# Patient Record
Sex: FEMALE | Race: WHITE | Employment: UNEMPLOYED | URBAN - NONMETROPOLITAN AREA
[De-identification: names, ages, dates, MRNs, and addresses within clinical notes are randomized per-mention and may not be internally consistent; named-entity substitution may affect disease eponyms.]

---

## 2018-02-06 ENCOUNTER — DOCUMENTATION ONLY (OUTPATIENT)
Dept: FAMILY MEDICINE | Facility: OTHER | Age: 53
End: 2018-02-06

## 2018-02-06 PROBLEM — J45.20 ASTHMA, INTERMITTENT: Status: ACTIVE | Noted: 2018-02-06

## 2018-02-06 PROBLEM — F19.20 CHEMICAL DEPENDENCY (H): Status: ACTIVE | Noted: 2018-02-06

## 2018-02-06 RX ORDER — BUDESONIDE AND FORMOTEROL FUMARATE DIHYDRATE 160; 4.5 UG/1; UG/1
2 AEROSOL RESPIRATORY (INHALATION) 2 TIMES DAILY
COMMUNITY
Start: 2016-03-21 | End: 2018-10-31

## 2018-02-06 RX ORDER — IBUPROFEN 600 MG/1
1 TABLET, FILM COATED ORAL DAILY
Status: ON HOLD | COMMUNITY
Start: 2013-09-11 | End: 2020-04-05

## 2018-02-06 RX ORDER — HYDROXYZINE HYDROCHLORIDE 50 MG/1
50 TABLET, FILM COATED ORAL 4 TIMES DAILY
COMMUNITY
Start: 2016-09-12 | End: 2018-06-11

## 2018-02-06 RX ORDER — ALBUTEROL SULFATE 90 UG/1
2 AEROSOL, METERED RESPIRATORY (INHALATION) EVERY 4 HOURS PRN
COMMUNITY
Start: 2015-01-12 | End: 2018-10-31

## 2018-02-06 RX ORDER — IPRATROPIUM BROMIDE AND ALBUTEROL SULFATE 2.5; .5 MG/3ML; MG/3ML
3 SOLUTION RESPIRATORY (INHALATION) 4 TIMES DAILY PRN
COMMUNITY
Start: 2016-09-12 | End: 2018-10-31

## 2018-02-06 RX ORDER — PREDNISONE 20 MG/1
TABLET ORAL
COMMUNITY
Start: 2016-09-12 | End: 2018-06-11

## 2018-02-06 RX ORDER — ACETAMINOPHEN 650 MG/1
650 SUPPOSITORY RECTAL EVERY 4 HOURS PRN
COMMUNITY
Start: 2012-10-31 | End: 2020-03-16

## 2018-05-07 ENCOUNTER — OFFICE VISIT (OUTPATIENT)
Dept: FAMILY MEDICINE | Facility: OTHER | Age: 53
End: 2018-05-07
Attending: PHYSICIAN ASSISTANT
Payer: COMMERCIAL

## 2018-05-07 VITALS
OXYGEN SATURATION: 99 % | DIASTOLIC BLOOD PRESSURE: 82 MMHG | WEIGHT: 122 LBS | HEIGHT: 65 IN | SYSTOLIC BLOOD PRESSURE: 162 MMHG | TEMPERATURE: 99.1 F | HEART RATE: 120 BPM | BODY MASS INDEX: 20.33 KG/M2

## 2018-05-07 DIAGNOSIS — R82.90 ABNORMAL URINE FINDINGS: ICD-10-CM

## 2018-05-07 DIAGNOSIS — R30.0 DYSURIA: Primary | ICD-10-CM

## 2018-05-07 DIAGNOSIS — J01.00 ACUTE MAXILLARY SINUSITIS, RECURRENCE NOT SPECIFIED: ICD-10-CM

## 2018-05-07 LAB
ALBUMIN UR-MCNC: NEGATIVE MG/DL
APPEARANCE UR: CLEAR
BACTERIA #/AREA URNS HPF: ABNORMAL /HPF
BILIRUB UR QL STRIP: NEGATIVE
COLOR UR AUTO: YELLOW
GLUCOSE UR STRIP-MCNC: NEGATIVE MG/DL
HGB UR QL STRIP: ABNORMAL
KETONES UR STRIP-MCNC: NEGATIVE MG/DL
LEUKOCYTE ESTERASE UR QL STRIP: ABNORMAL
NITRATE UR QL: POSITIVE
PH UR STRIP: 5.5 PH (ref 5–7)
RBC #/AREA URNS AUTO: ABNORMAL /HPF
SOURCE: ABNORMAL
SP GR UR STRIP: <=1.005 (ref 1–1.03)
UROBILINOGEN UR STRIP-ACNC: 0.2 EU/DL (ref 0.2–1)
WBC #/AREA URNS AUTO: ABNORMAL /HPF

## 2018-05-07 PROCEDURE — G0463 HOSPITAL OUTPT CLINIC VISIT: HCPCS

## 2018-05-07 PROCEDURE — 81001 URINALYSIS AUTO W/SCOPE: CPT | Mod: ZL | Performed by: PHYSICIAN ASSISTANT

## 2018-05-07 PROCEDURE — 99203 OFFICE O/P NEW LOW 30 MIN: CPT | Performed by: PHYSICIAN ASSISTANT

## 2018-05-07 PROCEDURE — 87086 URINE CULTURE/COLONY COUNT: CPT | Mod: ZL | Performed by: PHYSICIAN ASSISTANT

## 2018-05-07 PROCEDURE — 87186 SC STD MICRODIL/AGAR DIL: CPT | Mod: ZL | Performed by: PHYSICIAN ASSISTANT

## 2018-05-07 PROCEDURE — 87088 URINE BACTERIA CULTURE: CPT | Mod: ZL | Performed by: PHYSICIAN ASSISTANT

## 2018-05-07 PROCEDURE — G0463 HOSPITAL OUTPT CLINIC VISIT: HCPCS | Mod: 25

## 2018-05-07 RX ORDER — SULFAMETHOXAZOLE/TRIMETHOPRIM 800-160 MG
1 TABLET ORAL 2 TIMES DAILY
Qty: 28 TABLET | Refills: 0 | Status: SHIPPED | OUTPATIENT
Start: 2018-05-07 | End: 2018-05-21

## 2018-05-07 ASSESSMENT — ANXIETY QUESTIONNAIRES
6. BECOMING EASILY ANNOYED OR IRRITABLE: NOT AT ALL
7. FEELING AFRAID AS IF SOMETHING AWFUL MIGHT HAPPEN: NOT AT ALL
2. NOT BEING ABLE TO STOP OR CONTROL WORRYING: NOT AT ALL
1. FEELING NERVOUS, ANXIOUS, OR ON EDGE: NOT AT ALL
5. BEING SO RESTLESS THAT IT IS HARD TO SIT STILL: NOT AT ALL
GAD7 TOTAL SCORE: 0
3. WORRYING TOO MUCH ABOUT DIFFERENT THINGS: NOT AT ALL

## 2018-05-07 ASSESSMENT — PAIN SCALES - GENERAL: PAINLEVEL: NO PAIN (0)

## 2018-05-07 ASSESSMENT — PATIENT HEALTH QUESTIONNAIRE - PHQ9: 5. POOR APPETITE OR OVEREATING: NOT AT ALL

## 2018-05-07 NOTE — NURSING NOTE
"Chief Complaint   Patient presents with     UTI     URI       Initial /82 (BP Location: Right arm, Patient Position: Chair, Cuff Size: Adult Large)  Pulse 120  Temp 99.1  F (37.3  C) (Tympanic)  Ht 5' 5\" (1.651 m)  Wt 122 lb (55.3 kg)  SpO2 99%  BMI 20.3 kg/m2 Estimated body mass index is 20.3 kg/(m^2) as calculated from the following:    Height as of this encounter: 5' 5\" (1.651 m).    Weight as of this encounter: 122 lb (55.3 kg).  Medication Reconciliation: complete  Deena Guzmán LPN    "

## 2018-05-07 NOTE — PROGRESS NOTES
SUBJECTIVE:   Nidhi Miguel is a 52 year old female who presents to clinic today for the following health issues: 2 week history of dysuria and urinary frequency. Next 2 week history nasal congestion, PND, cough, chills.      RESPIRATORY SYMPTOMS      Duration: 1 week    Description  nasal congestion and Shortness of breath    Severity: moderate    Accompanying signs and symptoms: None    History (predisposing factors):  COPD    Precipitating or alleviating factors: Resting    Therapies tried and outcome:  Advair     URINARY TRACT SYMPTOMS      Duration: 2 weeks    Description  dysuria, frequency, urgency and odor    Intensity:  severe    Accompanying signs and symptoms:  Fever/chills: YES- Patient states she gets chills at night and feels warm (unable to check temp)  Flank pain no   Nausea and vomiting: YES- nausea  Vaginal symptoms: none  Abdominal/Pelvic Pain: YES    History  History of frequent UTI's: YES  History of kidney stones: no   Sexually Active: YES  Possibility of pregnancy: No    Precipitating or alleviating factors: None    Therapies tried and outcome: ibuprofen   Outcome: none          Problem list and histories reviewed & adjusted, as indicated.  Additional history: as documented    There is no problem list on file for this patient.    History reviewed. No pertinent surgical history.    Social History   Substance Use Topics     Smoking status: Current Every Day Smoker     Packs/day: 1.00     Start date: 1/1/1980     Smokeless tobacco: Never Used     Alcohol use Yes     Family History   Problem Relation Age of Onset     Coronary Artery Disease Father      Hypertension Father      Prostate Cancer Father      DIABETES No family hx of      Colon Cancer No family hx of      Breast Cancer No family hx of      Other Cancer No family hx of          Current Outpatient Prescriptions   Medication Sig Dispense Refill     sulfamethoxazole-trimethoprim (BACTRIM DS/SEPTRA DS) 800-160 MG per tablet Take 1 tablet  "by mouth 2 times daily for 14 days 28 tablet 0     Not on File    Reviewed and updated as needed this visit by clinical staff  Tobacco  Allergies  Meds  Med Hx  Surg Hx  Fam Hx  Soc Hx      Reviewed and updated as needed this visit by Provider         ROS:  Constitutional, HEENT, cardiovascular, pulmonary, gi and gu systems are negative, except as otherwise noted.    OBJECTIVE:                                                    /82 (BP Location: Right arm, Patient Position: Chair, Cuff Size: Adult Large)  Pulse 120  Temp 99.1  F (37.3  C) (Tympanic)  Ht 5' 5\" (1.651 m)  Wt 122 lb (55.3 kg)  SpO2 99%  BMI 20.3 kg/m2  Body mass index is 20.3 kg/(m^2).  GENERAL APPEARANCE: healthy, alert and no distress  HENT: ear canals and TM's normal and and mouth without ulcers or lesions. Nasal mucosa erythematous, edematous  NECK: no adenopathy and no asymmetry, masses, or scars  RESP: lungs clear to auscultation - no rales, rhonchi or wheezes  CV: regular rates and rhythm, normal S1 S2, no S3 or S4 and no murmur, click or rub  ABDOMEN: soft, nontender, without hepatosplenomegaly or masses and bowel sounds normal         ASSESSMENT/PLAN:                                                    1. Dysuria    - *UA reflex to Microscopic and Culture - [facility]  - Urine Microscopic  - sulfamethoxazole-trimethoprim (BACTRIM DS/SEPTRA DS) 800-160 MG per tablet; Take 1 tablet by mouth 2 times daily for 14 days  Dispense: 28 tablet; Refill: 0    2. Abnormal urine findings    - Urine Culture Aerobic Bacterial    3. Acute maxillary sinusitis, recurrence not specified    - sulfamethoxazole-trimethoprim (BACTRIM DS/SEPTRA DS) 800-160 MG per tablet; Take 1 tablet by mouth 2 times daily for 14 days  Dispense: 28 tablet; Refill: 0      Follow up if symptoms persist or worsen.      WILIAM Muller  [facility]  "

## 2018-05-07 NOTE — MR AVS SNAPSHOT
"              After Visit Summary   2018    Nidhi Miguel    MRN: [id number]           Patient Information     Date Of Birth          1965        Visit Information        Provider Department      2018 2:00 PM Pam Acuña PA [facility]        Today's Diagnoses     Dysuria    -  1    Abnormal urine findings        Acute maxillary sinusitis, recurrence not specified           Follow-ups after your visit        Who to contact     If you have questions or need follow up information about today's clinic visit or your schedule please contact [facility] directly at [phone].  Normal or non-critical lab and imaging results will be communicated to you by 2degreesmobilehart, letter or phone within 4 business days after the clinic has received the results. If you do not hear from us within 7 days, please contact the clinic through 2degreesmobilehart or phone. If you have a critical or abnormal lab result, we will notify you by phone as soon as possible.  Submit refill requests through [employer] or call your pharmacy and they will forward the refill request to us. Please allow 3 business days for your refill to be completed.          Additional Information About Your Visit        MyChart Information     [employer] lets you send messages to your doctor, view your test results, renew your prescriptions, schedule appointments and more. To sign up, go to [email or web address] . Click on \"Log in\" on the left side of the screen, which will take you to the Welcome page. Then click on \"Sign up Now\" on the right side of the page.     You will be asked to enter the access code listed below, as well as some personal information. Please follow the directions to create your username and password.     Your access code is: [email or web address]  Expires: 2018  5:17 PM     Your access code will  in 90 days. If you need help or a new code, please call your [facility] or [phone].        Care EveryWhere ID  " "   This is your Care EveryWhere ID. This could be used by other organizations to access your [facility] medical records  [id number]        Your Vitals Were     Pulse Temperature Height Pulse Oximetry BMI (Body Mass Index)       120 99.1  F (37.3  C) (Tympanic) 5' 5\" (1.651 m) 99% 20.3 kg/m2        Blood Pressure from Last 3 Encounters:   05/07/18 162/82    Weight from Last 3 Encounters:   05/07/18 122 lb (55.3 kg)              We Performed the Following     *UA reflex to Microscopic and Culture - [facility]     Urine Culture Aerobic Bacterial     Urine Microscopic          Today's Medication Changes          These changes are accurate as of 5/7/18  5:17 PM.  If you have any questions, ask your nurse or doctor.               Start taking these medicines.        Dose/Directions    sulfamethoxazole-trimethoprim 800-160 MG per tablet   Commonly known as:  BACTRIM DS/SEPTRA DS   Used for:  Dysuria, Acute maxillary sinusitis, recurrence not specified   Started by:  Pam Acuña, PA        Dose:  1 tablet   Take 1 tablet by mouth 2 times daily for 14 days   Quantity:  28 tablet   Refills:  0            Where to get your medicines      These medications were sent to [id number] - [place], [place] - [address]   [id number] , [place] [address]     Phone:  [phone]     sulfamethoxazole-trimethoprim 800-160 MG per tablet                Primary Care Provider    None Specified       No primary provider on file.        Equal Access to Services     AMINAH [facility] : Lena mantilla Soannalise, waaxda luqadaha, qaybta kaalmada ramos wright . So [facility] [phone].    ATENCIÓN: Si habla español, tiene a diop disposición servicios gratuitos de asistencia lingüística. Masood al [phone].    We comply with applicable federal civil rights laws and [place] laws. We do not discriminate on the basis of race, color, national origin, age, disability, sex, sexual orientation, or " gender identity.            Thank you!     Thank you for choosing [facility]  for your care. Our goal is always to provide you with excellent care. Hearing back from our patients is one way we can continue to improve our services. Please take a few minutes to complete the written survey that you may receive in the mail after your visit with us. Thank you!             Your Updated Medication List - Protect others around you: Learn how to safely use, store and throw away your medicines at www.disposemymeds.org.          This list is accurate as of 5/7/18  5:17 PM.  Always use your most recent med list.                   Brand Name Dispense Instructions for use Diagnosis    sulfamethoxazole-trimethoprim 800-160 MG per tablet    BACTRIM DS/SEPTRA DS    28 tablet    Take 1 tablet by mouth 2 times daily for 14 days    Dysuria, Acute maxillary sinusitis, recurrence not specified

## 2018-05-08 ASSESSMENT — ANXIETY QUESTIONNAIRES: GAD7 TOTAL SCORE: 0

## 2018-05-08 ASSESSMENT — PATIENT HEALTH QUESTIONNAIRE - PHQ9: SUM OF ALL RESPONSES TO PHQ QUESTIONS 1-9: 4

## 2018-05-09 LAB
BACTERIA SPEC CULT: ABNORMAL
SPECIMEN SOURCE: ABNORMAL

## 2018-05-25 ENCOUNTER — OFFICE VISIT (OUTPATIENT)
Dept: FAMILY MEDICINE | Facility: OTHER | Age: 53
End: 2018-05-25
Attending: PHYSICIAN ASSISTANT
Payer: COMMERCIAL

## 2018-05-25 ENCOUNTER — RADIANT APPOINTMENT (OUTPATIENT)
Dept: GENERAL RADIOLOGY | Facility: OTHER | Age: 53
End: 2018-05-25
Attending: PHYSICIAN ASSISTANT
Payer: COMMERCIAL

## 2018-05-25 VITALS
SYSTOLIC BLOOD PRESSURE: 112 MMHG | BODY MASS INDEX: 20.79 KG/M2 | OXYGEN SATURATION: 99 % | HEART RATE: 87 BPM | WEIGHT: 124.8 LBS | TEMPERATURE: 99.2 F | HEIGHT: 65 IN | DIASTOLIC BLOOD PRESSURE: 76 MMHG

## 2018-05-25 DIAGNOSIS — S89.92XA KNEE INJURY, LEFT, INITIAL ENCOUNTER: ICD-10-CM

## 2018-05-25 DIAGNOSIS — S89.92XA KNEE INJURY, LEFT, INITIAL ENCOUNTER: Primary | ICD-10-CM

## 2018-05-25 PROCEDURE — 73562 X-RAY EXAM OF KNEE 3: CPT | Mod: TC,LT,FY

## 2018-05-25 PROCEDURE — 99213 OFFICE O/P EST LOW 20 MIN: CPT | Performed by: PHYSICIAN ASSISTANT

## 2018-05-25 PROCEDURE — G0463 HOSPITAL OUTPT CLINIC VISIT: HCPCS

## 2018-05-25 RX ORDER — TRAMADOL HYDROCHLORIDE 50 MG/1
TABLET ORAL
Qty: 20 TABLET | Refills: 0 | Status: SHIPPED | OUTPATIENT
Start: 2018-05-25 | End: 2018-06-11

## 2018-05-25 ASSESSMENT — ANXIETY QUESTIONNAIRES
5. BEING SO RESTLESS THAT IT IS HARD TO SIT STILL: NOT AT ALL
6. BECOMING EASILY ANNOYED OR IRRITABLE: NOT AT ALL
7. FEELING AFRAID AS IF SOMETHING AWFUL MIGHT HAPPEN: NOT AT ALL
1. FEELING NERVOUS, ANXIOUS, OR ON EDGE: NOT AT ALL
GAD7 TOTAL SCORE: 0
2. NOT BEING ABLE TO STOP OR CONTROL WORRYING: NOT AT ALL
3. WORRYING TOO MUCH ABOUT DIFFERENT THINGS: NOT AT ALL

## 2018-05-25 ASSESSMENT — PATIENT HEALTH QUESTIONNAIRE - PHQ9: 5. POOR APPETITE OR OVEREATING: NOT AT ALL

## 2018-05-25 ASSESSMENT — PAIN SCALES - GENERAL: PAINLEVEL: SEVERE PAIN (6)

## 2018-05-25 NOTE — MR AVS SNAPSHOT
"              After Visit Summary   5/25/2018    Nidhi Miguel    MRN: [id number]           Patient Information     Date Of Birth          1965        Visit Information        Provider Department      5/25/2018 4:00 PM Pam Acuña PA [facility]        Today's Diagnoses     Knee injury, left, initial encounter    -  1       Follow-ups after your visit        Who to contact     If you have questions or need follow up information about today's clinic visit or your schedule please contact [facility] directly at [phone].  Normal or non-critical lab and imaging results will be communicated to you by MyChart, letter or phone within 4 business days after the clinic has received the results. If you do not hear from us within 7 days, please contact the clinic through MyChart or phone. If you have a critical or abnormal lab result, we will notify you by phone as soon as possible.  Submit refill requests through [employer] or call your pharmacy and they will forward the refill request to us. Please allow 3 business days for your refill to be completed.          Additional Information About Your Visit        Care EveryWhere ID     This is your Care EveryWhere ID. This could be used by other organizations to access your [facility] medical records  [id number]        Your Vitals Were     Pulse Temperature Height Pulse Oximetry BMI (Body Mass Index)       87 99.2  F (37.3  C) (Tympanic) 5' 5\" (1.651 m) 99% 20.77 kg/m2        Blood Pressure from Last 3 Encounters:   05/25/18 112/76   05/07/18 162/82   05/13/16 110/68    Weight from Last 3 Encounters:   05/25/18 124 lb 12.8 oz (56.6 kg)   05/07/18 122 lb (55.3 kg)   05/13/16 120 lb (54.4 kg)                 Today's Medication Changes          These changes are accurate as of 5/25/18  4:41 PM.  If you have any questions, ask your nurse or doctor.               Start taking these medicines.        Dose/Directions    traMADol 50 MG tablet "   Commonly known as:  ULTRAM   Used for:  Knee injury, left, initial encounter   Started by:  Pam Acuña PA        1-2 tabs bid prn   Quantity:  20 tablet   Refills:  0            Where to get your medicines      Some of these will need a paper prescription and others can be bought over the counter.  Ask your nurse if you have questions.     Bring a paper prescription for each of these medications     traMADol 50 MG tablet               Information about OPIOIDS     PRESCRIPTION OPIOIDS: WHAT YOU NEED TO KNOW   You have a prescription for an opioid (narcotic) pain medicine. Opioids can cause addiction. If you have a history of chemical dependency of any type, you are at a higher risk of becoming addicted to opioids. Only take this medicine after all other options have been tried. Take it for as short a time and as few doses as possible.     Do not:    Drive. If you drive while taking these medicines, you could be arrested for driving under the influence (DUI).    Operate heavy machinery    Do any other dangerous activities while taking these medicines.     Drink any alcohol while taking these medicines.      Take with any other medicines that contain acetaminophen. Read all labels carefully. Look for the word  acetaminophen  or  Tylenol.  Ask your pharmacist if you have questions or are unsure.    Store your pills in a secure place, locked if possible. We will not replace any lost or stolen medicine. If you don t finish your medicine, please throw away (dispose) as directed by your pharmacist. The [place] Pollution Control Agency has more information about safe disposal: https://www.pca.[facility].[place].us/living-green/managing-unwanted-medications    All opioids tend to cause constipation. Drink plenty of water and eat foods that have a lot of fiber, such as fruits, vegetables, prune juice, apple juice and high-fiber cereal. Take a laxative (Miralax, milk of magnesia, Colace, Senna) if you don t move your bowels  at least every other day.          Primary Care Provider Office Phone # Fax #    Andrea Grissom [phone]       [address] [facility] [address]        Equal Access to Services     NIKHIL ARAYA : Hadyakov mitch harding daytono Somaikolali, waaxda luqadaha, qaybta kaalmada adeegyada, ramos barcenas steph tillman. So [facility] [phone].    ATENCIÓN: Si habla español, tiene a diop disposición servicios gratuitos de asistencia lingüística. LlCleveland Clinic Foundation [phone].    We comply with applicable federal civil rights laws and [place] laws. We do not discriminate on the basis of race, color, national origin, age, disability, sex, sexual orientation, or gender identity.            Thank you!     Thank you for choosing [facility]  for your care. Our goal is always to provide you with excellent care. Hearing back from our patients is one way we can continue to improve our services. Please take a few minutes to complete the written survey that you may receive in the mail after your visit with us. Thank you!             Your Updated Medication List - Protect others around you: Learn how to safely use, store and throw away your medicines at www.disposemymeds.org.          This list is accurate as of 5/25/18  4:41 PM.  Always use your most recent med list.                   Brand Name Dispense Instructions for use Diagnosis    acetaminophen 650 MG Suppository    TYLENOL     Take 650 mg by mouth every 4 hours as needed for pain        albuterol 108 (90 Base) MCG/ACT Inhaler    PROAIR HFA/PROVENTIL HFA/VENTOLIN HFA     Inhale 2 puffs into the lungs every 4 hours as needed        hydrOXYzine 50 MG tablet    ATARAX     Take 50 mg by mouth 4 times daily        ibuprofen 600 MG tablet    ADVIL/MOTRIN     Take 1 tablet by mouth daily        ipratropium - albuterol 0.5 mg/2.5 mg/3 mL 0.5-2.5 (3) MG/3ML neb solution    DUONEB     Inhale 3 mLs into the lungs 4 times daily as needed         predniSONE 20 MG tablet    DELTASONE     40 mg po daily x 5 days , then 20 mg po daily x 5 days        SYMBICORT 160-4.5 MCG/ACT Inhaler   Generic drug:  budesonide-formoterol      Inhale 2 puffs into the lungs 2 times daily        traMADol 50 MG tablet    ULTRAM    20 tablet    1-2 tabs bid prn    Knee injury, left, initial encounter

## 2018-05-25 NOTE — PROGRESS NOTES
SUBJECTIVE:   Nidhi Miguel is a 52 year old female who presents to clinic today with 1 week history of left knee injury after twisting while trying to catch a box someone was passing to her. Swelling and pain. Denies paresthesias.      Musculoskeletal problem/pain      Duration: 1 week    Description  Location: L knee    Intensity:  moderate    Accompanying signs and symptoms: warmth, swelling and bruising    History  Previous similar problem: no   Previous evaluation:  none    Precipitating or alleviating factors:  Trauma or overuse: no   Aggravating factors include: standing, walking and climbing stairs    Therapies tried and outcome: rest/inactivity, ice, acetaminophen and Ibuprofen          Problem list and histories reviewed & adjusted, as indicated.  Additional history: as documented    Patient Active Problem List   Diagnosis     Other and unspecified alcohol dependence, episodic drinking behavior     Asthma, intermittent     Chemical dependency (H)     Chronic neck pain     Alcohol-induced pancreatitis     Tobacco use disorder     Past Surgical History:   Procedure Laterality Date     FUSION CERVICAL ANTERIOR ONE LEVEL      2/2012,C3-7 posterior instrumented fusion iwth lateral mass screws and cancellous bone and progenix     LUMPECTOMY BREAST      1998,Left, benign     OTHER SURGICAL HISTORY      1990s,[id number],COLPOSCOPY,Abnormal paps     OTHER SURGICAL HISTORY      2000,600000,OTHER,Right floating kidney     OTHER SURGICAL HISTORY      3/2010,[id number],CERVICAL DISKECTOMY,C5-6, subsequent osteomyelitis and deformity     OTHER SURGICAL HISTORY      2/2012,[id number],CERVICAL DISKECTOMY,C5-6 redo and C4-7 arthrodesis and graft and anterior placement of plate.       Social History   Substance Use Topics     Smoking status: Current Every Day Smoker     Packs/day: 1.00     Years: 30.00     Types: Cigarettes     Start date: 1/1/1980     Smokeless tobacco: Never Used     Alcohol use 8.4 oz/week      Comment:  "Alcoholic Drinks/day: 1 liter ETOH/day-now none 2015.  2016: \"a couple drinks/day\"     Family History   Problem Relation Age of Onset     Coronary Artery Disease Father      Hypertension Father      Hypertension     Prostate Cancer Father      DIABETES No family hx of      Colon Cancer No family hx of      Breast Cancer No family hx of      Other Cancer No family hx of      Substance Abuse Father      Alcohol/Drug,alcoholic but quit     Substance Abuse Paternal Grandfather      Alcohol/Drug,emphysema chemically induced..     Hypertension Paternal Grandfather      Hypertension     CANCER Maternal Grandmother      Cancer,Pancreatic cancer         Current Outpatient Prescriptions   Medication Sig Dispense Refill     acetaminophen (TYLENOL) 650 MG Suppository Take 650 mg by mouth every 4 hours as needed for pain       albuterol (PROAIR HFA/PROVENTIL HFA/VENTOLIN HFA) 108 (90 BASE) MCG/ACT Inhaler Inhale 2 puffs into the lungs every 4 hours as needed       budesonide-formoterol (SYMBICORT) 160-4.5 MCG/ACT Inhaler Inhale 2 puffs into the lungs 2 times daily       hydrOXYzine (ATARAX) 50 MG tablet Take 50 mg by mouth 4 times daily       ibuprofen (ADVIL/MOTRIN) 600 MG tablet Take 1 tablet by mouth daily       ipratropium - albuterol 0.5 mg/2.5 mg/3 mL (DUONEB) 0.5-2.5 (3) MG/3ML neb solution Inhale 3 mLs into the lungs 4 times daily as needed       predniSONE (DELTASONE) 20 MG tablet 40 mg po daily x 5 days , then 20 mg po daily x 5 days       Allergies   Allergen Reactions     Lorazepam      Other reaction(s): Confusion     Acetaminophen-Codeine Hives     Codeine Hives     Tylenol 3       Reviewed and updated as needed this visit by clinical staff       Reviewed and updated as needed this visit by Provider         ROS:  MUSCULOSKELETAL: as above  NEURO: NEGATIVE for weakness, dizziness or paresthesias    OBJECTIVE:                                                    /76  Pulse 87  Temp 99.2  F (37.3  C) (Tympanic) " " Ht 5' 5\" (1.651 m)  Wt 124 lb 12.8 oz (56.6 kg)  SpO2 99%  BMI 20.77 kg/m2  Body mass index is 20.77 kg/(m^2).  GENERAL APPEARANCE: healthy, alert and no distress  MS: Moderate soft tissue mass that feels boggy distal and medial to left patella. Ecchymosis extending to left ankle. Knee diffusely TTP  SKIN: no suspicious lesions or rashes  PSYCH: mentation appears normal and affect normal/bright         ASSESSMENT/PLAN:                                                    1. Knee injury, left, initial encounter  Xray appears normal. Ace wrap. RICE. F/u with any worsening of symptoms.  - XR KNEE LEFT 3 VIEWS (Clinic Performed); Future      Follow up if symptoms persist or worsen.      WILIAM Muller  [facility]  "

## 2018-05-25 NOTE — NURSING NOTE
"Chief Complaint   Patient presents with     Musculoskeletal Problem       Initial /76  Pulse 87  Temp 99.2  F (37.3  C) (Tympanic)  Ht 5' 5\" (1.651 m)  Wt 124 lb 12.8 oz (56.6 kg)  SpO2 99%  BMI 20.77 kg/m2 Estimated body mass index is 20.77 kg/(m^2) as calculated from the following:    Height as of this encounter: 5' 5\" (1.651 m).    Weight as of this encounter: 124 lb 12.8 oz (56.6 kg).  Medication Reconciliation: complete    Dian De La Fuente LPN  "

## 2018-05-26 ASSESSMENT — ANXIETY QUESTIONNAIRES: GAD7 TOTAL SCORE: 0

## 2018-05-26 ASSESSMENT — PATIENT HEALTH QUESTIONNAIRE - PHQ9: SUM OF ALL RESPONSES TO PHQ QUESTIONS 1-9: 1

## 2018-06-11 ENCOUNTER — OFFICE VISIT (OUTPATIENT)
Dept: FAMILY MEDICINE | Facility: OTHER | Age: 53
End: 2018-06-11
Attending: PHYSICIAN ASSISTANT
Payer: COMMERCIAL

## 2018-06-11 VITALS
OXYGEN SATURATION: 100 % | DIASTOLIC BLOOD PRESSURE: 70 MMHG | WEIGHT: 124 LBS | HEART RATE: 94 BPM | TEMPERATURE: 97.9 F | HEIGHT: 65 IN | BODY MASS INDEX: 20.66 KG/M2 | SYSTOLIC BLOOD PRESSURE: 126 MMHG

## 2018-06-11 DIAGNOSIS — S89.92XA KNEE INJURY, LEFT, INITIAL ENCOUNTER: ICD-10-CM

## 2018-06-11 DIAGNOSIS — M25.562 LEFT KNEE PAIN, UNSPECIFIED CHRONICITY: Primary | ICD-10-CM

## 2018-06-11 PROCEDURE — 99213 OFFICE O/P EST LOW 20 MIN: CPT | Performed by: PHYSICIAN ASSISTANT

## 2018-06-11 PROCEDURE — G0463 HOSPITAL OUTPT CLINIC VISIT: HCPCS

## 2018-06-11 RX ORDER — TRAMADOL HYDROCHLORIDE 50 MG/1
TABLET ORAL
Qty: 20 TABLET | Refills: 0 | Status: SHIPPED | OUTPATIENT
Start: 2018-06-11 | End: 2018-10-31

## 2018-06-11 ASSESSMENT — PAIN SCALES - GENERAL: PAINLEVEL: SEVERE PAIN (6)

## 2018-06-11 ASSESSMENT — ANXIETY QUESTIONNAIRES
7. FEELING AFRAID AS IF SOMETHING AWFUL MIGHT HAPPEN: NOT AT ALL
5. BEING SO RESTLESS THAT IT IS HARD TO SIT STILL: NOT AT ALL
3. WORRYING TOO MUCH ABOUT DIFFERENT THINGS: NOT AT ALL
6. BECOMING EASILY ANNOYED OR IRRITABLE: NOT AT ALL
GAD7 TOTAL SCORE: 0
2. NOT BEING ABLE TO STOP OR CONTROL WORRYING: NOT AT ALL
1. FEELING NERVOUS, ANXIOUS, OR ON EDGE: NOT AT ALL

## 2018-06-11 ASSESSMENT — PATIENT HEALTH QUESTIONNAIRE - PHQ9: 5. POOR APPETITE OR OVEREATING: NOT AT ALL

## 2018-06-11 NOTE — NURSING NOTE
"Chief Complaint   Patient presents with     Musculoskeletal Problem       Initial /70  Pulse 94  Temp 97.9  F (36.6  C) (Tympanic)  Ht 5' 5\" (1.651 m)  Wt 124 lb (56.2 kg)  SpO2 100%  BMI 20.63 kg/m2 Estimated body mass index is 20.63 kg/(m^2) as calculated from the following:    Height as of this encounter: 5' 5\" (1.651 m).    Weight as of this encounter: 124 lb (56.2 kg).  Medication Reconciliation: complete    Dian De La Fuente LPN  "

## 2018-06-11 NOTE — PROGRESS NOTES
"  SUBJECTIVE:   Nidhi Miguel is a 52 year old female who presents to clinic today for f/u of left knee injury of 1 month ago. Not improving. Xray was normal      Musculoskeletal problem/pain      Duration: 1 month    Description  Location: Lrft knee    Intensity:  moderate    Accompanying signs and symptoms: swelling and pain    History  Previous similar problem: no   Previous evaluation:  x-ray    Precipitating or alleviating factors:  Trauma or overuse: YES  Aggravating factors include: standing, walking, climbing stairs and overuse    Therapies tried and outcome: rest/inactivity, heat, support wrap and acetaminophen          Problem list and histories reviewed & adjusted, as indicated.  Additional history: as documented    Patient Active Problem List   Diagnosis     Other and unspecified alcohol dependence, episodic drinking behavior     Asthma, intermittent     Chemical dependency (H)     Chronic neck pain     Alcohol-induced pancreatitis     Tobacco use disorder     Past Surgical History:   Procedure Laterality Date     FUSION CERVICAL ANTERIOR ONE LEVEL      2/2012,C3-7 posterior instrumented fusion iwth lateral mass screws and cancellous bone and progenix     LUMPECTOMY BREAST      1998,Left, benign     OTHER SURGICAL HISTORY      1990s,[id number],COLPOSCOPY,Abnormal paps     OTHER SURGICAL HISTORY      2000,600000,OTHER,Right floating kidney     OTHER SURGICAL HISTORY      3/2010,[id number],CERVICAL DISKECTOMY,C5-6, subsequent osteomyelitis and deformity     OTHER SURGICAL HISTORY      2/2012,[id number],CERVICAL DISKECTOMY,C5-6 redo and C4-7 arthrodesis and graft and anterior placement of plate.       Social History   Substance Use Topics     Smoking status: Current Every Day Smoker     Packs/day: 1.00     Years: 30.00     Types: Cigarettes     Start date: 1/1/1980     Smokeless tobacco: Never Used     Alcohol use 8.4 oz/week      Comment: Alcoholic Drinks/day: 1 liter ETOH/day-now none 2015.  2016: \"a couple " "drinks/day\"     Family History   Problem Relation Age of Onset     Coronary Artery Disease Father      Hypertension Father      Hypertension     Prostate Cancer Father      DIABETES No family hx of      Colon Cancer No family hx of      Breast Cancer No family hx of      Other Cancer No family hx of      Substance Abuse Father      Alcohol/Drug,alcoholic but quit     Substance Abuse Paternal Grandfather      Alcohol/Drug,emphysema chemically induced..     Hypertension Paternal Grandfather      Hypertension     CANCER Maternal Grandmother      Cancer,Pancreatic cancer         Current Outpatient Prescriptions   Medication Sig Dispense Refill     acetaminophen (TYLENOL) 650 MG Suppository Take 650 mg by mouth every 4 hours as needed for pain       albuterol (PROAIR HFA/PROVENTIL HFA/VENTOLIN HFA) 108 (90 BASE) MCG/ACT Inhaler Inhale 2 puffs into the lungs every 4 hours as needed       budesonide-formoterol (SYMBICORT) 160-4.5 MCG/ACT Inhaler Inhale 2 puffs into the lungs 2 times daily       ibuprofen (ADVIL/MOTRIN) 600 MG tablet Take 1 tablet by mouth daily       ipratropium - albuterol 0.5 mg/2.5 mg/3 mL (DUONEB) 0.5-2.5 (3) MG/3ML neb solution Inhale 3 mLs into the lungs 4 times daily as needed       traMADol (ULTRAM) 50 MG tablet 1-2 tabs bid prn 20 tablet 0     Allergies   Allergen Reactions     Lorazepam      Other reaction(s): Confusion     Acetaminophen-Codeine Hives     Codeine Hives     Tylenol 3       Reviewed and updated as needed this visit by clinical staff       Reviewed and updated as needed this visit by Provider         ROS:  CONSTITUTIONAL:NEGATIVE for fever, chills, change in weight  MUSCULOSKELETAL: as above  PSYCHIATRIC: NEGATIVE for changes in mood or affect    OBJECTIVE:                                                    /70  Pulse 94  Temp 97.9  F (36.6  C) (Tympanic)  Ht 5' 5\" (1.651 m)  Wt 124 lb (56.2 kg)  SpO2 100%  BMI 20.63 kg/m2  Body mass index is 20.63 kg/(m^2).  GENERAL " APPEARANCE: healthy, alert and no distress  MS: ecchymosis left lower leg. 5 cm raised spongy subcutaneous lesion   medial below patella. TTP   PSYCH: mentation appears normal and affect normal/bright         ASSESSMENT/PLAN:                                                    1. Left knee pain, unspecified chronicity  Refer to ortho  - ORTHOPEDICS ADULT REFERRAL    2. Knee injury, left, initial encounter  RF  - traMADol (ULTRAM) 50 MG tablet; 1-2 tabs bid prn  Dispense: 20 tablet; Refill: 0      Follow up if symptoms persist or worsen.      WILIAM Muller  [facility]

## 2018-06-11 NOTE — MR AVS SNAPSHOT
After Visit Summary   6/11/2018    Nidhi Miguel    MRN: [id number]           Patient Information     Date Of Birth          1965        Visit Information        Provider Department      6/11/2018 2:00 PM Pam Acuña PA [facility]        Today's Diagnoses     Left knee pain, unspecified chronicity    -  1    Knee injury, left, initial encounter           Follow-ups after your visit        Additional Services     ORTHOPEDICS ADULT REFERRAL       Your provider has referred you to [facility]    Please be aware that coverage of these services is subject to the terms and limitations of your health insurance plan.  Call member services at your health plan with any benefit or coverage questions.      Please bring the following to your appointment:    >>   Any x-rays, CTs or MRIs which have been performed.  Contact the facility where they were done to arrange for  prior to your scheduled appointment.    >>   List of current medications   >>   This referral request   >>   Any documents/labs given to you for this referral                  Who to contact     If you have questions or need follow up information about today's clinic visit or your schedule please contact [facility] directly at [phone].  Normal or non-critical lab and imaging results will be communicated to you by MyChart, letter or phone within 4 business days after the clinic has received the results. If you do not hear from us within 7 days, please contact the clinic through MyChart or phone. If you have a critical or abnormal lab result, we will notify you by phone as soon as possible.  Submit refill requests through [employer] or call your pharmacy and they will forward the refill request to us. Please allow 3 business days for your refill to be completed.          Additional Information About Your Visit        Care EveryWhere ID     This is your Care EveryWhere ID. This could be used by  "other organizations to access your [place] medical records  [id number]        Your Vitals Were     Pulse Temperature Height Pulse Oximetry BMI (Body Mass Index)       94 97.9  F (36.6  C) (Tympanic) 5' 5\" (1.651 m) 100% 20.63 kg/m2        Blood Pressure from Last 3 Encounters:   06/11/18 126/70   05/25/18 112/76   05/07/18 162/82    Weight from Last 3 Encounters:   06/11/18 124 lb (56.2 kg)   05/25/18 124 lb 12.8 oz (56.6 kg)   05/07/18 122 lb (55.3 kg)              We Performed the Following     ORTHOPEDICS ADULT REFERRAL          Today's Medication Changes          These changes are accurate as of 6/11/18  2:26 PM.  If you have any questions, ask your nurse or doctor.               Stop taking these medicines if you haven't already. Please contact your care team if you have questions.     hydrOXYzine 50 MG tablet   Commonly known as:  ATARAX   Stopped by:  Pam Acuña PA           predniSONE 20 MG tablet   Commonly known as:  DELTASONE   Stopped by:  Pam Acuña PA                Where to get your medicines      Some of these will need a paper prescription and others can be bought over the counter.  Ask your nurse if you have questions.     Bring a paper prescription for each of these medications     traMADol 50 MG tablet               Information about OPIOIDS     PRESCRIPTION OPIOIDS: WHAT YOU NEED TO KNOW   You have a prescription for an opioid (narcotic) pain medicine. Opioids can cause addiction. If you have a history of chemical dependency of any type, you are at a higher risk of becoming addicted to opioids. Only take this medicine after all other options have been tried. Take it for as short a time and as few doses as possible.     Do not:    Drive. If you drive while taking these medicines, you could be arrested for driving under the influence (DUI).    Operate heavy machinery    Do any other dangerous activities while taking these medicines.     Drink any alcohol while taking these " medicines.      Take with any other medicines that contain acetaminophen. Read all labels carefully. Look for the word  acetaminophen  or  Tylenol.  Ask your pharmacist if you have questions or are unsure.    Store your pills in a secure place, locked if possible. We will not replace any lost or stolen medicine. If you don t finish your medicine, please throw away (dispose) as directed by your pharmacist. The [place] Pollution Control Agency has more information about safe disposal: https://www.pca.[facility].[place].us/living-green/managing-unwanted-medications    All opioids tend to cause constipation. Drink plenty of water and eat foods that have a lot of fiber, such as fruits, vegetables, prune juice, apple juice and high-fiber cereal. Take a laxative (Miralax, milk of magnesia, Colace, Senna) if you don t move your bowels at least every other day.          Primary Care Provider Office Phone # Fax #    Andrea Grissom [phone]       [address] [address]        Equal Access to Services     NIKHIL ARAYA : Hadii mitch ku hadasho Soomaali, waaxda luqadaha, qaybta kaalmada adeegyada, waxay skip hayhumphrey choi . So [facility] [phone].    ATENCIÓN: Si habla español, tiene a diop disposición servicios gratuitos de asistencia lingüística. Llame al [phone].    We comply with applicable federal civil rights laws and [place] laws. We do not discriminate on the basis of race, color, national origin, age, disability, sex, sexual orientation, or gender identity.            Thank you!     Thank you for choosing [facility]  for your care. Our goal is always to provide you with excellent care. Hearing back from our patients is one way we can continue to improve our services. Please take a few minutes to complete the written survey that you may receive in the mail after your visit with us. Thank you!             Your Updated Medication List - Protect others  around you: Learn how to safely use, store and throw away your medicines at www.disposemymeds.org.          This list is accurate as of 6/11/18  2:26 PM.  Always use your most recent med list.                   Brand Name Dispense Instructions for use Diagnosis    acetaminophen 650 MG Suppository    TYLENOL     Take 650 mg by mouth every 4 hours as needed for pain        albuterol 108 (90 Base) MCG/ACT Inhaler    PROAIR HFA/PROVENTIL HFA/VENTOLIN HFA     Inhale 2 puffs into the lungs every 4 hours as needed        ibuprofen 600 MG tablet    ADVIL/MOTRIN     Take 1 tablet by mouth daily        ipratropium - albuterol 0.5 mg/2.5 mg/3 mL 0.5-2.5 (3) MG/3ML neb solution    DUONEB     Inhale 3 mLs into the lungs 4 times daily as needed        SYMBICORT 160-4.5 MCG/ACT Inhaler   Generic drug:  budesonide-formoterol      Inhale 2 puffs into the lungs 2 times daily        traMADol 50 MG tablet    ULTRAM    20 tablet    1-2 tabs bid prn    Knee injury, left, initial encounter

## 2018-06-12 ASSESSMENT — ANXIETY QUESTIONNAIRES: GAD7 TOTAL SCORE: 0

## 2018-06-12 ASSESSMENT — PATIENT HEALTH QUESTIONNAIRE - PHQ9: SUM OF ALL RESPONSES TO PHQ QUESTIONS 1-9: 1

## 2018-06-14 ENCOUNTER — TRANSFERRED RECORDS (OUTPATIENT)
Dept: HEALTH INFORMATION MANAGEMENT | Facility: CLINIC | Age: 53
End: 2018-06-14

## 2018-06-20 ENCOUNTER — HOSPITAL ENCOUNTER (OUTPATIENT)
Dept: MRI IMAGING | Facility: HOSPITAL | Age: 53
Discharge: HOME | End: 2018-06-20
Attending: ORTHOPAEDIC SURGERY | Admitting: ORTHOPAEDIC SURGERY
Payer: COMMERCIAL

## 2018-06-20 DIAGNOSIS — M25.562 LEFT KNEE PAIN: ICD-10-CM

## 2018-06-20 PROCEDURE — 73721 MRI JNT OF LWR EXTRE W/O DYE: CPT | Mod: TC,LT

## 2018-06-24 ENCOUNTER — HEALTH MAINTENANCE LETTER (OUTPATIENT)
Age: 53
End: 2018-06-24

## 2018-06-26 ENCOUNTER — TELEPHONE (OUTPATIENT)
Dept: FAMILY MEDICINE | Facility: OTHER | Age: 53
End: 2018-06-26

## 2018-06-27 ENCOUNTER — TRANSFERRED RECORDS (OUTPATIENT)
Dept: HEALTH INFORMATION MANAGEMENT | Facility: CLINIC | Age: 53
End: 2018-06-27

## 2018-06-29 DIAGNOSIS — S89.92XA KNEE INJURY, LEFT, INITIAL ENCOUNTER: ICD-10-CM

## 2018-06-29 RX ORDER — TRAMADOL HYDROCHLORIDE 50 MG/1
TABLET ORAL
Qty: 20 TABLET | Refills: 0 | OUTPATIENT
Start: 2018-06-29

## 2018-06-29 NOTE — TELEPHONE ENCOUNTER
tramadol      Last Written Prescription Date:  6/11/18  Last Fill Quantity: 20,   # refills: 0  Last Office Visit: 6/11/18  Future Office visit:       Routing refill request to provider for review/approval because:  Drug not on the [facility], P or [facility] refill protocol or controlled substance

## 2018-07-06 ENCOUNTER — HOSPITAL ENCOUNTER (OUTPATIENT)
Dept: MRI IMAGING | Facility: HOSPITAL | Age: 53
Discharge: HOME | End: 2018-07-06
Attending: ORTHOPAEDIC SURGERY | Admitting: ORTHOPAEDIC SURGERY
Payer: COMMERCIAL

## 2018-07-06 DIAGNOSIS — M89.8X6 TIBIAL MASS: ICD-10-CM

## 2018-07-06 PROCEDURE — 73722 MRI JOINT OF LWR EXTR W/DYE: CPT | Mod: TC,LT

## 2018-07-06 PROCEDURE — 25000128 H RX IP 250 OP 636: Performed by: RADIOLOGY

## 2018-07-06 PROCEDURE — A9585 GADOBUTROL INJECTION: HCPCS | Performed by: RADIOLOGY

## 2018-07-06 RX ORDER — GADOBUTROL 604.72 MG/ML
7.5 INJECTION INTRAVENOUS ONCE
Status: COMPLETED | OUTPATIENT
Start: 2018-07-06 | End: 2018-07-06

## 2018-07-06 RX ADMIN — GADOBUTROL 7.5 ML: 604.72 INJECTION INTRAVENOUS at 18:28

## 2018-07-18 ENCOUNTER — TRANSFERRED RECORDS (OUTPATIENT)
Dept: HEALTH INFORMATION MANAGEMENT | Facility: CLINIC | Age: 53
End: 2018-07-18

## 2018-08-16 ENCOUNTER — TRANSFERRED RECORDS (OUTPATIENT)
Dept: HEALTH INFORMATION MANAGEMENT | Facility: CLINIC | Age: 53
End: 2018-08-16

## 2018-10-31 ENCOUNTER — OFFICE VISIT (OUTPATIENT)
Dept: FAMILY MEDICINE | Facility: OTHER | Age: 53
End: 2018-10-31
Attending: PHYSICIAN ASSISTANT
Payer: COMMERCIAL

## 2018-10-31 VITALS
TEMPERATURE: 98.1 F | WEIGHT: 135 LBS | DIASTOLIC BLOOD PRESSURE: 82 MMHG | OXYGEN SATURATION: 98 % | SYSTOLIC BLOOD PRESSURE: 142 MMHG | HEART RATE: 135 BPM | BODY MASS INDEX: 22.49 KG/M2 | RESPIRATION RATE: 18 BRPM | HEIGHT: 65 IN

## 2018-10-31 DIAGNOSIS — Z72.0 TOBACCO ABUSE: ICD-10-CM

## 2018-10-31 DIAGNOSIS — R30.0 DYSURIA: ICD-10-CM

## 2018-10-31 DIAGNOSIS — R82.79 ABNORMAL FINDINGS ON MICROBIOLOGICAL EXAMINATION OF URINE: Primary | ICD-10-CM

## 2018-10-31 DIAGNOSIS — Z71.6 TOBACCO ABUSE COUNSELING: ICD-10-CM

## 2018-10-31 DIAGNOSIS — J45.20 INTERMITTENT ASTHMA WITHOUT COMPLICATION, UNSPECIFIED ASTHMA SEVERITY: ICD-10-CM

## 2018-10-31 DIAGNOSIS — J01.01 ACUTE RECURRENT MAXILLARY SINUSITIS: ICD-10-CM

## 2018-10-31 LAB
ALBUMIN UR-MCNC: 100 MG/DL
APPEARANCE UR: ABNORMAL
BACTERIA #/AREA URNS HPF: ABNORMAL /HPF
BILIRUB UR QL STRIP: NEGATIVE
COLOR UR AUTO: YELLOW
GLUCOSE UR STRIP-MCNC: NEGATIVE MG/DL
HGB UR QL STRIP: ABNORMAL
KETONES UR STRIP-MCNC: ABNORMAL MG/DL
LEUKOCYTE ESTERASE UR QL STRIP: ABNORMAL
NITRATE UR QL: POSITIVE
NON-SQ EPI CELLS #/AREA URNS LPF: ABNORMAL /LPF
PH UR STRIP: 6 PH (ref 5–7)
RBC #/AREA URNS AUTO: ABNORMAL /HPF
SOURCE: ABNORMAL
SP GR UR STRIP: 1.02 (ref 1–1.03)
UROBILINOGEN UR STRIP-ACNC: 0.2 EU/DL (ref 0.2–1)
WBC #/AREA URNS AUTO: ABNORMAL /HPF

## 2018-10-31 PROCEDURE — 81001 URINALYSIS AUTO W/SCOPE: CPT | Mod: ZL | Performed by: PHYSICIAN ASSISTANT

## 2018-10-31 PROCEDURE — 87186 SC STD MICRODIL/AGAR DIL: CPT | Mod: ZL | Performed by: PHYSICIAN ASSISTANT

## 2018-10-31 PROCEDURE — G0463 HOSPITAL OUTPT CLINIC VISIT: HCPCS | Mod: 25

## 2018-10-31 PROCEDURE — 87088 URINE BACTERIA CULTURE: CPT | Mod: ZL | Performed by: PHYSICIAN ASSISTANT

## 2018-10-31 PROCEDURE — 94760 N-INVAS EAR/PLS OXIMETRY 1: CPT

## 2018-10-31 PROCEDURE — 96372 THER/PROPH/DIAG INJ SC/IM: CPT | Performed by: PHYSICIAN ASSISTANT

## 2018-10-31 PROCEDURE — 99214 OFFICE O/P EST MOD 30 MIN: CPT | Performed by: PHYSICIAN ASSISTANT

## 2018-10-31 PROCEDURE — 87086 URINE CULTURE/COLONY COUNT: CPT | Mod: ZL | Performed by: PHYSICIAN ASSISTANT

## 2018-10-31 RX ORDER — CEFTRIAXONE 1 G/1
1000 INJECTION, POWDER, FOR SOLUTION INTRAMUSCULAR; INTRAVENOUS ONCE
Qty: 10 ML | Refills: 0 | OUTPATIENT
Start: 2018-10-31 | End: 2018-10-31

## 2018-10-31 RX ORDER — ALBUTEROL SULFATE 90 UG/1
2 AEROSOL, METERED RESPIRATORY (INHALATION) EVERY 4 HOURS PRN
Qty: 1 INHALER | Refills: 11 | Status: SHIPPED | OUTPATIENT
Start: 2018-10-31 | End: 2020-07-23

## 2018-10-31 RX ORDER — BUDESONIDE AND FORMOTEROL FUMARATE DIHYDRATE 160; 4.5 UG/1; UG/1
2 AEROSOL RESPIRATORY (INHALATION) 2 TIMES DAILY
Qty: 1 INHALER | Refills: 11 | Status: SHIPPED | OUTPATIENT
Start: 2018-10-31 | End: 2020-02-21

## 2018-10-31 RX ORDER — IPRATROPIUM BROMIDE AND ALBUTEROL SULFATE 2.5; .5 MG/3ML; MG/3ML
3 SOLUTION RESPIRATORY (INHALATION) 4 TIMES DAILY PRN
Qty: 360 ML | Refills: 11 | Status: SHIPPED | OUTPATIENT
Start: 2018-10-31 | End: 2021-01-01

## 2018-10-31 ASSESSMENT — PAIN SCALES - GENERAL: PAINLEVEL: NO PAIN (0)

## 2018-10-31 ASSESSMENT — PATIENT HEALTH QUESTIONNAIRE - PHQ9
5. POOR APPETITE OR OVEREATING: NOT AT ALL
SUM OF ALL RESPONSES TO PHQ QUESTIONS 1-9: 7

## 2018-10-31 ASSESSMENT — ANXIETY QUESTIONNAIRES
IF YOU CHECKED OFF ANY PROBLEMS ON THIS QUESTIONNAIRE, HOW DIFFICULT HAVE THESE PROBLEMS MADE IT FOR YOU TO DO YOUR WORK, TAKE CARE OF THINGS AT HOME, OR GET ALONG WITH OTHER PEOPLE: NOT DIFFICULT AT ALL
GAD7 TOTAL SCORE: 2
6. BECOMING EASILY ANNOYED OR IRRITABLE: NOT AT ALL
7. FEELING AFRAID AS IF SOMETHING AWFUL MIGHT HAPPEN: NOT AT ALL
5. BEING SO RESTLESS THAT IT IS HARD TO SIT STILL: NOT AT ALL
2. NOT BEING ABLE TO STOP OR CONTROL WORRYING: SEVERAL DAYS
3. WORRYING TOO MUCH ABOUT DIFFERENT THINGS: SEVERAL DAYS
1. FEELING NERVOUS, ANXIOUS, OR ON EDGE: NOT AT ALL

## 2018-10-31 NOTE — MR AVS SNAPSHOT
After Visit Summary   10/31/2018    Nidhi Miguel    MRN: [id number]           Patient Information     Date Of Birth          1965        Visit Information        Provider Department      10/31/2018 8:15 AM Pam Acuña PA [facility]        Today's Diagnoses     Abnormal findings on microbiological examination of urine    -  1    Dysuria        Tobacco abuse        Tobacco abuse counseling        Intermittent asthma without complication, unspecified asthma severity        Acute recurrent maxillary sinusitis          Care Instructions      HOW TO QUIT SMOKING  Smoking is one of the hardest habits to break. About half of all those who have ever smoked have been able to quit, and most of those (about 70%) who still smoke want to quit. Here are some of the best ways to stop smoking.     KEEP TRYING:  It takes most smokers about 8 tries before they are finally able to fully quit. So, the more often you try and fail, the better your chance of quitting the next time! So, don't give up!    GO COLD TURKEY:  Most ex-smokers quit cold turkey. Trying to cut back gradually doesn't seem to work as well, perhaps because it continues the smoking habit. Also, it is possible to fool yourself by inhaling more while smoking fewer cigarettes. This results in the same amount of nicotine in your body!    GET SUPPORT:  Support programs can make an important difference, especially for the heavy smoker. These groups offer lectures, methods to change your behavior and peer support. Call the free national Quitline for more information. 800-QUIT-NOW (965-453-8599). Low-cost or free programs are offered by many hospitals, local chapters of the American Lung Association (768-138-2576) and the American Cancer Society (954-850-8068). Support at home is important too. Non-smokers can help by offering praise and encouragement. If the smoker fails to quit, encourage them to try  again!    OVER-THE-COUNTER MEDICINES:  For those who can't quit on their own, Nicotine Replacement Therapy (NRT) may make quitting much easier. Certain aids such as the nicotine patch, gum and lozenge are available without a prescription. However, it is best to use these under the guidance of your doctor. The skin patch provides a steady supply of nicotine to the body. Nicotine gum and lozenge gives temporary bursts of low levels of nicotine. Both methods take the edge off the craving for cigarettes. WARNING: If you feel symptoms of nicotine overdose, such as nausea, vomiting, dizziness, weakness, or fast heartbeat, stop using these and see your doctor.    PRESCRIPTION MEDICINES:  After evaluating your smoking patterns and prior attempts at quitting, your doctor may offer a prescription medicine such as bupropion (Zyban, Wellbutrin), varenicline (Chantix, Champix), a niocotine inhaler or nasal spray. Each has its unique advantage and side effects which your doctor can review with you.    HEALTH BENEFITS OF QUITTING:  The benefits of quitting start right away and keep improving the longer you go without smokin minutes: blood pressure and pulse return to normal  8 hours: oxygen levels return to normal  2 days: ability to smell and taste begins to improve as damaged nerves start to regrow  2-3 weeks: circulation and lung function improves  1-9 months: decreased cough, congestion and shortness of breath; less tired  1 year: risk of heart attack decreases by half  5 years: risk of lung cancer decreases by half; risk of stroke becomes the same as a non-smoker  For information about how to quit smoking, visit the following links:  National Cancer [place] ,   Clearing the Air, Quit Smoking Today   - an online booklet. http://www.smokefree.gov/pubs/clearing_the_air.pdf  Smokefree.gov http://smokefree.gov/  QuitNet http://www.quitnet.com/    8464-8831 Amanda Damon, [address] [facility], [place] [address]. All  rights reserved. This information is not intended as a substitute for professional medical care. Always follow your healthcare professional's instructions.    The Benefits of Living Smoke Free  What do you want to gain from quitting? Check off some reasons to quit.  Health Benefits  ___ Reduce my risk of lung cancer, heart disease, chronic lung disease  ___ Have fewer wrinkles and softer skin  ___ Improve my sense of taste and smell  ___ For pregnant women--reduce the risk of having a miscarriage, stillbirth, premature birth, or low-birth-weight baby  Personal Benefits  ___ Feel more in control of my life  ___ Have better-smelling hair, breath, clothes, home, and car  ___ Save time by not having to take smoke breaks, buy cigarettes, or hunt for a light  ___ Have whiter teeth  Family Benefits  ___ Reduce my children s respiratory tract infections  ___ Set a good example for my children  ___ Reduce my family s cancer risk  Financial Benefits  ___ Save hundreds of dollars each year that would be spent on cigarettes  ___ Save money on medical bills  ___ Save on life, health, and car insurance premiums    Those Dollars Add Up!  Cigarettes are expensive, and getting more expensive all the time. Do you realize how much money you are spending on cigarettes per year? What is the average amount you spend on a pack of cigarettes? What is the average number of packs that you smoke per day? Using your answers to these questions, fill in this formula to help you find out:  ($ _____ per pack) ×  ( _____ number of packs per day) × (365 days) =  $ _____ yearly cost of smoking  Besides tobacco, there are other costs, including extra cleaning bills and replacement costs for clothing and furniture; medical expenses for smoking-related illnesses; and higher health, life, and car insurance premiums.    Cigars and Pipes Count Too!  Cigars and pipes are also dangerous. So are smokeless (chewing) tobacco and snuff. All of these products  "contain nicotine, a highly addictive substance that has harmful effects on your body. Quitting smoking means giving up all tobacco products.      6500-5174 Amanda Damon, [address] [place] [address], [place] [address]. All rights reserved. This information is not intended as a substitute for professional medical care. Always follow your healthcare professional's instructions.          Follow-ups after your visit        Who to contact     If you have questions or need follow up information about today's clinic visit or your schedule please contact [facility] directly at [phone].  Normal or non-critical lab and imaging results will be communicated to you by MyChart, letter or phone within 4 business days after the clinic has received the results. If you do not hear from us within 7 days, please contact the clinic through MyChart or phone. If you have a critical or abnormal lab result, we will notify you by phone as soon as possible.  Submit refill requests through [employer] or call your pharmacy and they will forward the refill request to us. Please allow 3 business days for your refill to be completed.          Additional Information About Your Visit        Care EveryWhere ID     This is your Care EveryWhere ID. This could be used by other organizations to access your [place] medical records  [id number]        Your Vitals Were     Pulse Temperature Respirations Height Pulse Oximetry BMI (Body Mass Index)    135 98.1  F (36.7  C) (Tympanic) 18 5' 5\" (1.651 m) 98% 22.47 kg/m2       Blood Pressure from Last 3 Encounters:   10/31/18 142/82   06/11/18 126/70   05/25/18 112/76    Weight from Last 3 Encounters:   10/31/18 135 lb (61.2 kg)   06/11/18 124 lb (56.2 kg)   05/25/18 124 lb 12.8 oz (56.6 kg)              We Performed the Following     Tobacco Cessation - Order to Satisfy Health Maintenance     UA reflex to Microscopic and Culture     Urine Culture Aerobic Bacterial     Urine Microscopic  "         Today's Medication Changes          These changes are accurate as of 10/31/18  9:47 AM.  If you have any questions, ask your nurse or doctor.               Start taking these medicines.        Dose/Directions    amoxicillin-clavulanate 875-125 MG per tablet   Commonly known as:  AUGMENTIN   Used for:  Acute recurrent maxillary sinusitis   Started by:  Pam Acuña PA        Dose:  1 tablet   Take 1 tablet by mouth 2 times daily   Quantity:  28 tablet   Refills:  0       cefTRIAXone 1 GM vial   Commonly known as:  ROCEPHIN   Used for:  Abnormal findings on microbiological examination of urine   Started by:  Pam Acuña PA        Dose:  1000 mg   Inject 1 g (1,000 mg) into the muscle once for 1 dose   Quantity:  10 mL   Refills:  0         Stop taking these medicines if you haven't already. Please contact your care team if you have questions.     traMADol 50 MG tablet   Commonly known as:  ULTRAM   Stopped by:  Pam Acuña PA                Where to get your medicines      These medications were sent to [id number] - [place] - [address]  [address], [place] [address]     Phone:  [phone]     albuterol 108 (90 Base) MCG/ACT inhaler    amoxicillin-clavulanate 875-125 MG per tablet    budesonide-formoterol 160-4.5 MCG/ACT Inhaler    ipratropium - albuterol 0.5 mg/2.5 mg/3 mL 0.5-2.5 (3) MG/3ML neb solution         Some of these will need a paper prescription and others can be bought over the counter.  Ask your nurse if you have questions.     You don't need a prescription for these medications     cefTRIAXone 1 GM vial                Primary Care Provider Office Phone # Fax #    WILIAM Velasco [phone]       [facility] [place] [address] [place] [address]        Equal Access to Services     [facility] MARSHAL AH: Lena Guallpa, waaxda luqadaha, qaybta kaalmada adeegyada, ramos tillman. So [facility]  [phone].    ATENCIÓN: Si daniel bright, tiene a diop disposición servicios gratuitos de asistencia lingüística. Masood martin [phone].    We comply with applicable federal civil rights laws and [place] laws. We do not discriminate on the basis of race, color, national origin, age, disability, sex, sexual orientation, or gender identity.            Thank you!     Thank you for choosing [facility]  for your care. Our goal is always to provide you with excellent care. Hearing back from our patients is one way we can continue to improve our services. Please take a few minutes to complete the written survey that you may receive in the mail after your visit with us. Thank you!             Your Updated Medication List - Protect others around you: Learn how to safely use, store and throw away your medicines at www.disposemymeds.org.          This list is accurate as of 10/31/18  9:47 AM.  Always use your most recent med list.                   Brand Name Dispense Instructions for use Diagnosis    acetaminophen 650 MG Suppository    TYLENOL     Take 650 mg by mouth every 4 hours as needed for pain        albuterol 108 (90 Base) MCG/ACT inhaler    PROAIR HFA/PROVENTIL HFA/VENTOLIN HFA    1 Inhaler    Inhale 2 puffs into the lungs every 4 hours as needed    Intermittent asthma without complication, unspecified asthma severity       amoxicillin-clavulanate 875-125 MG per tablet    AUGMENTIN    28 tablet    Take 1 tablet by mouth 2 times daily    Acute recurrent maxillary sinusitis       budesonide-formoterol 160-4.5 MCG/ACT Inhaler    SYMBICORT    1 Inhaler    Inhale 2 puffs into the lungs 2 times daily    Intermittent asthma without complication, unspecified asthma severity       cefTRIAXone 1 GM vial    ROCEPHIN    10 mL    Inject 1 g (1,000 mg) into the muscle once for 1 dose    Abnormal findings on microbiological examination of urine       ibuprofen 600 MG tablet    ADVIL/MOTRIN     Take 1  tablet by mouth daily        ipratropium - albuterol 0.5 mg/2.5 mg/3 mL 0.5-2.5 (3) MG/3ML neb solution    DUONEB    360 mL    Inhale 1 vial (3 mLs) into the lungs 4 times daily as needed    Intermittent asthma without complication, unspecified asthma severity

## 2018-10-31 NOTE — PROGRESS NOTES
SUBJECTIVE:   Nidhi Miguel is a 53 year old female who presents to clinic today for the following health issues:  She has been out of inhalers for 1 month      URINARY TRACT SYMPTOMS      Duration: 3-4 days    Description  frequency, urgency, odor and burning    Intensity:  moderate    Accompanying signs and symptoms:  Fever/chills: YES  Flank pain YES  Nausea and vomiting: no   Vaginal symptoms: no  Abdominal/Pelvic Pain: YES    History  History of frequent UTI's: YES  History of kidney stones: no   Sexually Active: YES  Possibility of pregnancy: No    Precipitating or alleviating factors: None    Therapies tried and outcome: increase fluid intake   Outcome: did not help      Breathing Problem      Duration: 1.5 weeks    Description (location/character/radiation): sob, nonproductive cough    Intensity:  severe    Accompanying signs and symptoms: cough    History (similar episodes/previous evaluation): None    Precipitating or alleviating factors: None    Therapies tried and outcome: None  Out of inhalers        Problem list and histories reviewed & adjusted, as indicated.  Additional history: as documented    Patient Active Problem List   Diagnosis     Other and unspecified alcohol dependence, episodic drinking behavior     Asthma, intermittent     Chemical dependency (H)     Chronic neck pain     Alcohol-induced pancreatitis     Tobacco use disorder     Past Surgical History:   Procedure Laterality Date     FUSION CERVICAL ANTERIOR ONE LEVEL      2/2012,C3-7 posterior instrumented fusion iwth lateral mass screws and cancellous bone and progenix     LUMPECTOMY BREAST      1998,Left, benign     OTHER SURGICAL HISTORY      1990s,[id number],COLPOSCOPY,Abnormal paps     OTHER SURGICAL HISTORY      2000,600000,OTHER,Right floating kidney     OTHER SURGICAL HISTORY      3/2010,[id number],CERVICAL DISKECTOMY,C5-6, subsequent osteomyelitis and deformity     OTHER SURGICAL HISTORY      2/2012,[id number],CERVICAL DISKECTOMY,C5-6  "redo and C4-7 arthrodesis and graft and anterior placement of plate.       Social History   Substance Use Topics     Smoking status: Current Every Day Smoker     Packs/day: 1.00     Years: 30.00     Types: Cigarettes     Start date: 1/1/1980     Smokeless tobacco: Never Used     Alcohol use 8.4 oz/week      Comment: Alcoholic Drinks/day: 1 liter ETOH/day-now none 2015.  2016: \"a couple drinks/day\"     Family History   Problem Relation Age of Onset     Coronary Artery Disease Father      Hypertension Father      Hypertension     Prostate Cancer Father      Diabetes No family hx of      Colon Cancer No family hx of      Breast Cancer No family hx of      Other Cancer No family hx of      Substance Abuse Father      Alcohol/Drug,alcoholic but quit     Substance Abuse Paternal Grandfather      Alcohol/Drug,emphysema chemically induced..     Hypertension Paternal Grandfather      Hypertension     Cancer Maternal Grandmother      Cancer,Pancreatic cancer         Current Outpatient Prescriptions   Medication Sig Dispense Refill     acetaminophen (TYLENOL) 650 MG Suppository Take 650 mg by mouth every 4 hours as needed for pain       albuterol (PROAIR HFA/PROVENTIL HFA/VENTOLIN HFA) 108 (90 Base) MCG/ACT inhaler Inhale 2 puffs into the lungs every 4 hours as needed 1 Inhaler 11     amoxicillin-clavulanate (AUGMENTIN) 875-125 MG per tablet Take 1 tablet by mouth 2 times daily 28 tablet 0     budesonide-formoterol (SYMBICORT) 160-4.5 MCG/ACT Inhaler Inhale 2 puffs into the lungs 2 times daily 1 Inhaler 11     cefTRIAXone (ROCEPHIN) 1 GM vial Inject 1 g (1,000 mg) into the muscle once for 1 dose 10 mL 0     ibuprofen (ADVIL/MOTRIN) 600 MG tablet Take 1 tablet by mouth daily       ipratropium - albuterol 0.5 mg/2.5 mg/3 mL (DUONEB) 0.5-2.5 (3) MG/3ML neb solution Inhale 1 vial (3 mLs) into the lungs 4 times daily as needed 360 mL 11     [DISCONTINUED] albuterol (PROAIR HFA/PROVENTIL HFA/VENTOLIN HFA) 108 (90 BASE) MCG/ACT " "Inhaler Inhale 2 puffs into the lungs every 4 hours as needed       [DISCONTINUED] budesonide-formoterol (SYMBICORT) 160-4.5 MCG/ACT Inhaler Inhale 2 puffs into the lungs 2 times daily       [DISCONTINUED] ipratropium - albuterol 0.5 mg/2.5 mg/3 mL (DUONEB) 0.5-2.5 (3) MG/3ML neb solution Inhale 3 mLs into the lungs 4 times daily as needed       Allergies   Allergen Reactions     Lorazepam      Other reaction(s): Confusion     Acetaminophen-Codeine Hives     Codeine Hives     Tylenol 3       Reviewed and updated as needed this visit by clinical staff       Reviewed and updated as needed this visit by Provider         ROS:  Constitutional, HEENT, cardiovascular, pulmonary, gi and gu systems are negative, except as otherwise noted.    OBJECTIVE:                                                    /82  Pulse 135  Temp 98.1  F (36.7  C) (Tympanic)  Resp 18  Ht 5' 5\" (1.651 m)  Wt 135 lb (61.2 kg)  SpO2 98%  BMI 22.47 kg/m2  Body mass index is 22.47 kg/(m^2).  General: Alert, oriented. In NAD  Skin: warm and dry. No suspicious rash, lesions.  HEENT: EAC's and TM's intact. Posterior pharynx moist and pink without edema or exudate. Nasal mucosa edematous, erythematous. Maxillary sinuses TTP.Neck is supple. No lymphadenopathy.  Resp: Lungs CTA without wheeze, rale or rhonchi.  Cardiac: RRR. Normal S1, S2. No murmur.  Abdomen: Round, soft. Normal BS. NTTP. No rebound or guarding. No mass or organomegaly.    Psych. Mood euthymic with corresponding affect             ASSESSMENT/PLAN:                                                    (R82.79) Abnormal findings on microbiological examination of urine  (primary encounter diagnosis)  Comment: UTI  Plan: Urine Culture Aerobic Bacterial, cefTRIAXone         (ROCEPHIN) 1 GM vial            (R30.0) Dysuria  Plan: UA reflex to Microscopic and Culture, Urine         Microscopic            (Z72.0) Tobacco abuse  Plan: Tobacco Cessation - Order to Satisfy Health         " Maintenance            (Z71.6) Tobacco abuse counseling      (J45.20) Intermittent asthma without complication, unspecified asthma severity  Comment: RF  Plan: ipratropium - albuterol 0.5 mg/2.5 mg/3 mL         (DUONEB) 0.5-2.5 (3) MG/3ML neb solution,         budesonide-formoterol (SYMBICORT) 160-4.5         MCG/ACT Inhaler, albuterol (PROAIR         HFA/PROVENTIL HFA/VENTOLIN HFA) 108 (90 Base)         MCG/ACT inhaler            (J01.01) Acute recurrent maxillary sinusitis  Plan: amoxicillin-clavulanate (AUGMENTIN) 875-125 MG         per tablet                  Rest, increase fluids, Tylenol for fever or discomfort. Return to clinic if symptoms persist or worsen.      WILIAM Muller  [facility]

## 2018-10-31 NOTE — PATIENT INSTRUCTIONS

## 2018-10-31 NOTE — NURSING NOTE
"Chief Complaint   Patient presents with     Breathing Problem     Dysuria       Initial /82  Pulse 135  Temp 98.1  F (36.7  C) (Tympanic)  Resp 18  Ht 5' 5\" (1.651 m)  Wt 135 lb (61.2 kg)  SpO2 98%  BMI 22.47 kg/m2 Estimated body mass index is 22.47 kg/(m^2) as calculated from the following:    Height as of this encounter: 5' 5\" (1.651 m).    Weight as of this encounter: 135 lb (61.2 kg).  Medication Reconciliation: complete    Jayshree Herrera LPN  "

## 2018-11-01 ASSESSMENT — ANXIETY QUESTIONNAIRES: GAD7 TOTAL SCORE: 2

## 2018-11-02 LAB
BACTERIA SPEC CULT: ABNORMAL
SPECIMEN SOURCE: ABNORMAL

## 2019-02-08 ENCOUNTER — DOCUMENTATION ONLY (OUTPATIENT)
Dept: OTHER | Facility: CLINIC | Age: 54
End: 2019-02-08

## 2019-06-12 ENCOUNTER — TRANSFERRED RECORDS (OUTPATIENT)
Dept: HEALTH INFORMATION MANAGEMENT | Facility: OTHER | Age: 54
End: 2019-06-12

## 2019-06-18 ENCOUNTER — TRANSFERRED RECORDS (OUTPATIENT)
Dept: HEALTH INFORMATION MANAGEMENT | Facility: CLINIC | Age: 54
End: 2019-06-18

## 2019-06-18 LAB
C TRACH DNA SPEC QL PROBE+SIG AMP: NOT DETECTED
CHOLEST SERPL-MCNC: 323 MG/DL (ref 114–200)
HDLC SERPL-MCNC: 55 MG/DL (ref 40–60)
HEP C HIM: NORMAL
HIV 1+2 AB+HIV1 P24 AG SERPL QL IA: NONREACTIVE
HPV ABSTRACT: NORMAL
LDLC SERPL CALC-MCNC: 232 MG/DL
N GONORRHOEA DNA SPEC QL PROBE+SIG AMP: NOT DETECTED
PAP-ABSTRACT: NORMAL
SPECIMEN DESCRIP: NORMAL
SPECIMEN DESCRIPTION: NORMAL
TRIGL SERPL-MCNC: 182 MG/DL (ref 10–200)

## 2019-06-25 ENCOUNTER — TRANSFERRED RECORDS (OUTPATIENT)
Dept: HEALTH INFORMATION MANAGEMENT | Facility: CLINIC | Age: 54
End: 2019-06-25

## 2019-09-20 NOTE — TELEPHONE ENCOUNTER
She should use NSAID rather than Tramadol. She has ortho consult   Infliximab Counseling:  I discussed with the patient the risks of infliximab including but not limited to myelosuppression, immunosuppression, autoimmune hepatitis, demyelinating diseases, lymphoma, and serious infections.  The patient understands that monitoring is required including a PPD at baseline and must alert us or the primary physician if symptoms of infection or other concerning signs are noted.

## 2020-01-08 ENCOUNTER — TELEPHONE (OUTPATIENT)
Dept: FAMILY MEDICINE | Facility: OTHER | Age: 55
End: 2020-01-08

## 2020-01-08 RX ORDER — TRAMADOL HYDROCHLORIDE 50 MG/1
TABLET ORAL
COMMUNITY
Start: 2019-07-11 | End: 2020-01-09

## 2020-01-08 RX ORDER — TOPIRAMATE 25 MG/1
TABLET, FILM COATED ORAL
COMMUNITY
Start: 2019-06-12 | End: 2020-01-09

## 2020-01-08 RX ORDER — BUPROPION HYDROCHLORIDE 300 MG/1
TABLET ORAL
COMMUNITY
Start: 2019-08-02 | End: 2020-03-16

## 2020-01-08 RX ORDER — PNV NO.95/FERROUS FUM/FOLIC AC 28MG-0.8MG
TABLET ORAL
COMMUNITY
Start: 2019-06-12 | End: 2020-01-09

## 2020-01-08 RX ORDER — AMLODIPINE BESYLATE 5 MG/1
TABLET ORAL
COMMUNITY
Start: 2019-06-12 | End: 2020-06-24

## 2020-01-08 NOTE — TELEPHONE ENCOUNTER
Pt requests to be seen for bladder infection, pt reports frequency, burning, odor. Pt requests appointment for tomorrow due to ride.     Next 5 appointments (look out 90 days)    Jan 09, 2020 10:30 AM CST  (Arrive by 10:15 AM)  [facility]asco  [facility] [address] [place] [facility] [address]  [phone]

## 2020-01-08 NOTE — PROGRESS NOTES
Subjective     Nidhi Miguel is a 54 year old female who presents to clinic today for the following health issues:    HPI   URINARY TRACT SYMPTOMS      Duration: couple weeks    Description  dysuria, frequency, urgency and odor    Intensity:  moderate    Accompanying signs and symptoms:  Fever/chills: YES  Flank pain no   Nausea and vomiting: YES  Vaginal symptoms: odor  Abdominal/Pelvic Pain: YES- right lower quad    History  History of frequent UTI's: YES  History of kidney stones: no   Sexually Active: no   Possibility of pregnancy: No    Precipitating or alleviating factors: None    Therapies tried and outcome: Tylenol   Outcome: not effective        Patient Active Problem List   Diagnosis     Other and unspecified alcohol dependence, episodic drinking behavior     Asthma, intermittent     Chemical dependency (H)     Chronic neck pain     Alcohol-induced pancreatitis     Tobacco use disorder     Left middle cerebral artery stroke (H)     LTBI (latent tuberculosis infection)     Lumbago     Pseudoarthrosis of cervical spine (H)     Status post cervical spinal arthrodesis     Essential hypertension     Major depressive disorder, recurrent, moderate (H)     Past Surgical History:   Procedure Laterality Date     FUSION CERVICAL ANTERIOR ONE LEVEL      2/2012,C3-7 posterior instrumented fusion iwth lateral mass screws and cancellous bone and progenix     LUMPECTOMY BREAST      1998,Left, benign     OTHER SURGICAL HISTORY      1990s,[id number],COLPOSCOPY,Abnormal paps     OTHER SURGICAL HISTORY      2000,[id number],OTHER,Right floating kidney     OTHER SURGICAL HISTORY      3/2010,[id number],CERVICAL DISKECTOMY,C5-6, subsequent osteomyelitis and deformity     OTHER SURGICAL HISTORY      2/2012,[id number],CERVICAL DISKECTOMY,C5-6 redo and C4-7 arthrodesis and graft and anterior placement of plate.       Social History     Tobacco Use     Smoking status: Current Every Day Smoker     Packs/day: 1.00     Years: 30.00     Pack years:  "30.00     Types: Cigarettes     Start date: 1/1/1980     Smokeless tobacco: Never Used   Substance Use Topics     Alcohol use: Yes     Alcohol/week: 14.0 standard drinks     Comment: Alcoholic Drinks/day: 1 liter ETOH/day-now none 2015.  2016: \"a couple drinks/day\"     Family History   Problem Relation Age of Onset     Coronary Artery Disease Father      Hypertension Father         Hypertension     Prostate Cancer Father      Diabetes No family hx of      Colon Cancer No family hx of      Breast Cancer No family hx of      Other Cancer No family hx of      Substance Abuse Father         Alcohol/Drug,alcoholic but quit     Substance Abuse Paternal Grandfather         Alcohol/Drug,emphysema chemically induced..     Hypertension Paternal Grandfather         Hypertension     Cancer Maternal Grandmother         Cancer,Pancreatic cancer         Current Outpatient Medications   Medication Sig Dispense Refill     acetaminophen (TYLENOL) 650 MG Suppository Take 650 mg by mouth every 4 hours as needed for pain       albuterol (PROAIR HFA/PROVENTIL HFA/VENTOLIN HFA) 108 (90 Base) MCG/ACT inhaler Inhale 2 puffs into the lungs every 4 hours as needed 1 Inhaler 11     albuterol (PROVENTIL) (2.5 MG/3ML) 0.083% neb solution Inhale 2.5 mg into the lungs 4 times daily as needed       budesonide-formoterol (SYMBICORT) 160-4.5 MCG/ACT Inhaler Inhale 2 puffs into the lungs 2 times daily 1 Inhaler 11     buPROPion (WELLBUTRIN XL) 300 MG 24 hr tablet        ibuprofen (ADVIL/MOTRIN) 600 MG tablet Take 1 tablet by mouth daily       ipratropium - albuterol 0.5 mg/2.5 mg/3 mL (DUONEB) 0.5-2.5 (3) MG/3ML neb solution Inhale 1 vial (3 mLs) into the lungs 4 times daily as needed 360 mL 11     amLODIPine (NORVASC) 5 MG tablet        Allergies   Allergen Reactions     Lorazepam      Other reaction(s): Confusion     Acetaminophen-Codeine Hives     Codeine Hives     Tylenol 3       Reviewed and updated as needed this visit by Provider         Review " of Systems   ROS COMP: Constitutional, HEENT, cardiovascular, pulmonary, gi and gu systems are negative, except as otherwise noted.      Objective    There were no vitals taken for this visit.  There is no height or weight on file to calculate BMI.  Physical Exam   Gen:Appears well, in no apparent distress.   Resp: Lungs CTA without wheeze, rales, rhonchi  Card: RRR  Abd:Round, soft. Normal bowel sounds. NTTP. No mass or organomegaly. No CVA TTP.\              Assessment & Plan     (R30.0) Dysuria  (primary encounter diagnosis)  Comment: UTI  Plan: *UA reflex to Microscopic and Culture - [facility], Urine Culture Aerobic Bacterial,        Urine Microscopic,         sulfamethoxazole-trimethoprim (BACTRIM         DS/SEPTRA DS) 800-160 MG tablet            (R82.79) Abnormal findings on microbiological examination of urine  Plan: Urine Culture Aerobic Bacterial,         sulfamethoxazole-trimethoprim (BACTRIM         DS/SEPTRA DS) 800-160 MG tablet            (F17.200) Tobacco use disorder  Comment: Discussion with patient regarding the risks associated with tobacco including dependency and health related illnesses including lung cancer.The health benefits of stopping tobacco abuse are discussed. Discussed options available to help patient stop tobacco use including medications and support network. Patient shows good understanding    Plan: TOBACCO CESSATION - FOR HEALTH MAINTENANCE                 Tobacco Cessation:   reports that she has been smoking cigarettes. She started smoking about 40 years ago. She has a 30.00 pack-year smoking history. She has never used smokeless tobacco.          Rest, increase fluids, Tylenol for fever or discomfort. Return to clinic if symptoms persist or worsen.      No follow-ups on file.    WILIAM Muller  [facility]

## 2020-01-09 ENCOUNTER — OFFICE VISIT (OUTPATIENT)
Dept: FAMILY MEDICINE | Facility: OTHER | Age: 55
End: 2020-01-09
Attending: PHYSICIAN ASSISTANT
Payer: COMMERCIAL

## 2020-01-09 VITALS
SYSTOLIC BLOOD PRESSURE: 126 MMHG | BODY MASS INDEX: 21.97 KG/M2 | HEART RATE: 103 BPM | DIASTOLIC BLOOD PRESSURE: 78 MMHG | OXYGEN SATURATION: 94 % | WEIGHT: 132 LBS

## 2020-01-09 DIAGNOSIS — R82.79 ABNORMAL FINDINGS ON MICROBIOLOGICAL EXAMINATION OF URINE: ICD-10-CM

## 2020-01-09 DIAGNOSIS — R30.0 DYSURIA: Primary | ICD-10-CM

## 2020-01-09 DIAGNOSIS — F17.200 TOBACCO USE DISORDER: ICD-10-CM

## 2020-01-09 PROBLEM — I10 ESSENTIAL HYPERTENSION: Status: ACTIVE | Noted: 2019-06-20

## 2020-01-09 PROBLEM — F33.1 MAJOR DEPRESSIVE DISORDER, RECURRENT, MODERATE (H): Status: ACTIVE | Noted: 2019-06-20

## 2020-01-09 LAB
ALBUMIN UR-MCNC: NEGATIVE MG/DL
APPEARANCE UR: ABNORMAL
BACTERIA #/AREA URNS HPF: ABNORMAL /HPF
BILIRUB UR QL STRIP: NEGATIVE
COLOR UR AUTO: YELLOW
GLUCOSE UR STRIP-MCNC: NEGATIVE MG/DL
HGB UR QL STRIP: ABNORMAL
KETONES UR STRIP-MCNC: NEGATIVE MG/DL
LEUKOCYTE ESTERASE UR QL STRIP: ABNORMAL
NITRATE UR QL: POSITIVE
NON-SQ EPI CELLS #/AREA URNS LPF: ABNORMAL /LPF
PH UR STRIP: 5.5 PH (ref 5–7)
RBC #/AREA URNS AUTO: ABNORMAL /HPF
SOURCE: ABNORMAL
SP GR UR STRIP: 1.02 (ref 1–1.03)
UROBILINOGEN UR STRIP-ACNC: 0.2 EU/DL (ref 0.2–1)
WBC #/AREA URNS AUTO: >100 /HPF

## 2020-01-09 PROCEDURE — 99213 OFFICE O/P EST LOW 20 MIN: CPT | Performed by: PHYSICIAN ASSISTANT

## 2020-01-09 PROCEDURE — 87186 SC STD MICRODIL/AGAR DIL: CPT | Mod: ZL | Performed by: PHYSICIAN ASSISTANT

## 2020-01-09 PROCEDURE — G0463 HOSPITAL OUTPT CLINIC VISIT: HCPCS

## 2020-01-09 PROCEDURE — 81001 URINALYSIS AUTO W/SCOPE: CPT | Mod: ZL | Performed by: PHYSICIAN ASSISTANT

## 2020-01-09 PROCEDURE — 87088 URINE BACTERIA CULTURE: CPT | Mod: ZL | Performed by: PHYSICIAN ASSISTANT

## 2020-01-09 PROCEDURE — 87086 URINE CULTURE/COLONY COUNT: CPT | Mod: ZL | Performed by: PHYSICIAN ASSISTANT

## 2020-01-09 RX ORDER — SULFAMETHOXAZOLE/TRIMETHOPRIM 800-160 MG
1 TABLET ORAL 2 TIMES DAILY
Qty: 14 TABLET | Refills: 0 | Status: SHIPPED | OUTPATIENT
Start: 2020-01-09 | End: 2020-03-16

## 2020-01-09 RX ORDER — ALBUTEROL SULFATE 0.83 MG/ML
2.5 SOLUTION RESPIRATORY (INHALATION) 4 TIMES DAILY PRN
COMMUNITY

## 2020-01-09 ASSESSMENT — ASTHMA QUESTIONNAIRES
ACT_TOTALSCORE: 10
QUESTION_4 LAST FOUR WEEKS HOW OFTEN HAVE YOU USED YOUR RESCUE INHALER OR NEBULIZER MEDICATION (SUCH AS ALBUTEROL): ONE OR TWO TIMES PER DAY
QUESTION_3 LAST FOUR WEEKS HOW OFTEN DID YOUR ASTHMA SYMPTOMS (WHEEZING, COUGHING, SHORTNESS OF BREATH, CHEST TIGHTNESS OR PAIN) WAKE YOU UP AT NIGHT OR EARLIER THAN USUAL IN THE MORNING: TWO OR THREE NIGHTS A WEEK
ACUTE_EXACERBATION_TODAY: NO
QUESTION_1 LAST FOUR WEEKS HOW MUCH OF THE TIME DID YOUR ASTHMA KEEP YOU FROM GETTING AS MUCH DONE AT WORK, SCHOOL OR AT HOME: MOST OF THE TIME
QUESTION_5 LAST FOUR WEEKS HOW WOULD YOU RATE YOUR ASTHMA CONTROL: POORLY CONTROLLED
QUESTION_2 LAST FOUR WEEKS HOW OFTEN HAVE YOU HAD SHORTNESS OF BREATH: ONCE A DAY

## 2020-01-09 ASSESSMENT — ANXIETY QUESTIONNAIRES
7. FEELING AFRAID AS IF SOMETHING AWFUL MIGHT HAPPEN: NOT AT ALL
6. BECOMING EASILY ANNOYED OR IRRITABLE: NOT AT ALL
1. FEELING NERVOUS, ANXIOUS, OR ON EDGE: NOT AT ALL
2. NOT BEING ABLE TO STOP OR CONTROL WORRYING: NOT AT ALL
GAD7 TOTAL SCORE: 0
5. BEING SO RESTLESS THAT IT IS HARD TO SIT STILL: NOT AT ALL
3. WORRYING TOO MUCH ABOUT DIFFERENT THINGS: NOT AT ALL
4. TROUBLE RELAXING: NOT AT ALL

## 2020-01-09 ASSESSMENT — PAIN SCALES - GENERAL: PAINLEVEL: MODERATE PAIN (4)

## 2020-01-09 ASSESSMENT — PATIENT HEALTH QUESTIONNAIRE - PHQ9: SUM OF ALL RESPONSES TO PHQ QUESTIONS 1-9: 0

## 2020-01-09 NOTE — NURSING NOTE
"Chief Complaint   Patient presents with     Dysuria       Initial /78   Pulse 103   Wt 59.9 kg (132 lb)   SpO2 94%   BMI 21.97 kg/m   Estimated body mass index is 21.97 kg/m  as calculated from the following:    Height as of 10/31/18: 1.651 m (5' 5\").    Weight as of this encounter: 59.9 kg (132 lb).  Medication Reconciliation: complete  Isis Jaimes MA    "

## 2020-01-09 NOTE — LETTER
My Asthma Action Plan    Name: Nidhi Miguel   YOB: 1965  Date: 1/8/2020   My doctor: WILIAM Muller   My clinic: [facility]        My Rescue Medicine:   Albuterol inhaler (Proair/Ventolin/Proventil HFA)  2-4 puffs EVERY 4 HOURS as needed. Use a spacer if recommended by your provider.   My Asthma Severity:   Intermittent / Exercise Induced  Know your asthma triggers: Patient is unaware of triggers             GREEN ZONE   Good Control    I feel good    No cough or wheeze    Can work, sleep and play without asthma symptoms       Take your asthma control medicine every day.     1. If exercise triggers your asthma, take your rescue medication    15 minutes before exercise or sports, and    During exercise if you have asthma symptoms  2. Spacer to use with inhaler: If you have a spacer, make sure to use it with your inhaler             YELLOW ZONE Getting Worse  I have ANY of these:    I do not feel good    Cough or wheeze    Chest feels tight    Wake up at night   1. Keep taking your Green Zone medications  2. Start taking your rescue medicine:    every 20 minutes for up to 1 hour. Then every 4 hours for 24-48 hours.  3. If you stay in the Yellow Zone for more than 12-24 hours, contact your doctor.  4. If you do not return to the Green Zone in 12-24 hours or you get worse, start taking your oral steroid medicine if prescribed by your provider.           RED ZONE Medical Alert - Get Help  I have ANY of these:    I feel awful    Medicine is not helping    Breathing getting harder    Trouble walking or talking    Nose opens wide to breathe       1. Take your rescue medicine NOW  2. If your provider has prescribed an oral steroid medicine, start taking it NOW  3. Call your doctor NOW  4. If you are still in the Red Zone after 20 minutes and you have not reached your doctor:    Take your rescue medicine again and    Call 911 or go to the emergency room right away    See your  regular doctor within 2 weeks of an Emergency Room or Urgent Care visit for follow-up treatment.          Annual Reminders:  Meet with Asthma Educator,  Flu Shot in the Fall, consider Pneumonia Vaccination for patients with asthma (aged 19 and older).    Pharmacy: [facility] [address] [place], [place] [address] - [address] [address] [address]    Electronically signed by WILIAM Muller   Date: 01/08/20                    Asthma Triggers  How To Control Things That Make Your Asthma Worse    Triggers are things that make your asthma worse.  Look at the list below to help you find your triggers and   what you can do about them. You can help prevent asthma flare-ups by staying away from your triggers.      Trigger                                                          What you can do   Cigarette Smoke  Tobacco smoke can make asthma worse. Do not allow smoking in your home, car or around you.  Be sure no one smokes at a child s day care or school.  If you smoke, ask your health care provider for ways to help you quit.  Ask family members to quit too.  Ask your health care provider for a referral to Quit Plan to help you quit smoking, or call 0-527-024PLAN.     Colds, Flu, Bronchitis  These are common triggers of asthma. Wash your hands often.  Don t touch your eyes, nose or mouth.  Get a flu shot every year.     Dust Mites  These are tiny bugs that live in cloth or carpet. They are too small to see. Wash sheets and blankets in hot water every week.   Encase pillows and mattress in dust mite proof covers.  Avoid having carpet if you can. If you have carpet, vacuum weekly.   Use a dust mask and HEPA vacuum.   Pollen and Outdoor Mold  Some people are allergic to trees, grass, or weed pollen, or molds. Try to keep your windows closed.  Limit time out doors when pollen count is high.   Ask you health care provider about taking medicine during allergy season.     Animal Dander  Some people are allergic to skin flakes, urine or saliva from pets  with fur or feathers. Keep pets with fur or feathers out of your home.    If you can t keep the pet outdoors, then keep the pet out of your bedroom.  Keep the bedroom door closed.  Keep pets off cloth furniture and away from stuffed toys.     Mice, Rats, and Cockroaches  Some people are allergic to the waste from these pests.   Cover food and garbage.  Clean up spills and food crumbs.  Store grease in the refrigerator.   Keep food out of the bedroom.   Indoor Mold  This can be a trigger if your home has high moisture. Fix leaking faucets, pipes, or other sources of water.   Clean moldy surfaces.  Dehumidify basement if it is damp and smelly.   Smoke, Strong Odors, and Sprays  These can reduce air quality. Stay away from strong odors and sprays, such as perfume, powder, hair spray, paints, smoke incense, paint, cleaning products, candles and new carpet.   Exercise or Sports  Some people with asthma have this trigger. Be active!  Ask your doctor about taking medicine before sports or exercise to prevent symptoms.    Warm up for 5-10 minutes before and after sports or exercise.     Other Triggers of Asthma  Cold air:  Cover your nose and mouth with a scarf.  Sometimes laughing or crying can be a trigger.  Some medicines and food can trigger asthma.

## 2020-01-10 ASSESSMENT — ASTHMA QUESTIONNAIRES: ACT_TOTALSCORE: 10

## 2020-01-10 ASSESSMENT — ANXIETY QUESTIONNAIRES: GAD7 TOTAL SCORE: 0

## 2020-01-11 LAB
BACTERIA SPEC CULT: ABNORMAL
SPECIMEN SOURCE: ABNORMAL

## 2020-02-07 ENCOUNTER — HOSPITAL ENCOUNTER (EMERGENCY)
Facility: HOSPITAL | Age: 55
Discharge: ANOTHER HOSPITAL | End: 2020-02-08
Attending: PHYSICIAN ASSISTANT | Admitting: PHYSICIAN ASSISTANT
Payer: COMMERCIAL

## 2020-02-07 ENCOUNTER — APPOINTMENT (OUTPATIENT)
Dept: CT IMAGING | Facility: HOSPITAL | Age: 55
End: 2020-02-07
Attending: PHYSICIAN ASSISTANT
Payer: COMMERCIAL

## 2020-02-07 DIAGNOSIS — K92.2 GASTROINTESTINAL HEMORRHAGE, UNSPECIFIED GASTROINTESTINAL HEMORRHAGE TYPE: ICD-10-CM

## 2020-02-07 DIAGNOSIS — K52.9 COLITIS: ICD-10-CM

## 2020-02-07 DIAGNOSIS — K92.0 HEMATEMESIS WITH NAUSEA: ICD-10-CM

## 2020-02-07 DIAGNOSIS — F10.20 ETOHISM (H): ICD-10-CM

## 2020-02-07 DIAGNOSIS — R10.13 EPIGASTRIC PAIN: ICD-10-CM

## 2020-02-07 LAB
ALBUMIN SERPL-MCNC: 3.9 G/DL (ref 3.4–5)
ALP SERPL-CCNC: 121 U/L (ref 40–150)
ALT SERPL W P-5'-P-CCNC: 91 U/L (ref 0–50)
ANION GAP SERPL CALCULATED.3IONS-SCNC: 7 MMOL/L (ref 3–14)
AST SERPL W P-5'-P-CCNC: 146 U/L (ref 0–45)
BASOPHILS # BLD AUTO: 0.1 10E9/L (ref 0–0.2)
BASOPHILS NFR BLD AUTO: 1.4 %
BILIRUB SERPL-MCNC: 0.6 MG/DL (ref 0.2–1.3)
BUN SERPL-MCNC: 11 MG/DL (ref 7–30)
CALCIUM SERPL-MCNC: 8.4 MG/DL (ref 8.5–10.1)
CHLORIDE SERPL-SCNC: 104 MMOL/L (ref 94–109)
CO2 SERPL-SCNC: 27 MMOL/L (ref 20–32)
CREAT SERPL-MCNC: 0.79 MG/DL (ref 0.52–1.04)
DIFFERENTIAL METHOD BLD: ABNORMAL
EOSINOPHIL # BLD AUTO: 0.1 10E9/L (ref 0–0.7)
EOSINOPHIL NFR BLD AUTO: 1.2 %
ERYTHROCYTE [DISTWIDTH] IN BLOOD BY AUTOMATED COUNT: 13.2 % (ref 10–15)
ETHANOL SERPL-MCNC: 0.31 G/DL
GFR SERPL CREATININE-BSD FRML MDRD: 86 ML/MIN/{1.73_M2}
GLUCOSE SERPL-MCNC: 115 MG/DL (ref 70–99)
HCT VFR BLD AUTO: 37.6 % (ref 35–47)
HGB BLD-MCNC: 13.4 G/DL (ref 11.7–15.7)
IMM GRANULOCYTES # BLD: 0 10E9/L (ref 0–0.4)
IMM GRANULOCYTES NFR BLD: 0.2 %
INR PPP: 1.02 (ref 0.86–1.14)
LIPASE SERPL-CCNC: 597 U/L (ref 73–393)
LYMPHOCYTES # BLD AUTO: 0.9 10E9/L (ref 0.8–5.3)
LYMPHOCYTES NFR BLD AUTO: 21.8 %
MCH RBC QN AUTO: 36.9 PG (ref 26.5–33)
MCHC RBC AUTO-ENTMCNC: 35.6 G/DL (ref 31.5–36.5)
MCV RBC AUTO: 104 FL (ref 78–100)
MONOCYTES # BLD AUTO: 0.5 10E9/L (ref 0–1.3)
MONOCYTES NFR BLD AUTO: 11 %
NEUTROPHILS # BLD AUTO: 2.7 10E9/L (ref 1.6–8.3)
NEUTROPHILS NFR BLD AUTO: 64.4 %
NRBC # BLD AUTO: 0 10*3/UL
NRBC BLD AUTO-RTO: 0 /100
PLATELET # BLD AUTO: 75 10E9/L (ref 150–450)
POTASSIUM SERPL-SCNC: 3 MMOL/L (ref 3.4–5.3)
PROT SERPL-MCNC: 7.3 G/DL (ref 6.8–8.8)
RBC # BLD AUTO: 3.63 10E12/L (ref 3.8–5.2)
SODIUM SERPL-SCNC: 138 MMOL/L (ref 133–144)
WBC # BLD AUTO: 4.2 10E9/L (ref 4–11)

## 2020-02-07 PROCEDURE — 86901 BLOOD TYPING SEROLOGIC RH(D): CPT | Performed by: PHYSICIAN ASSISTANT

## 2020-02-07 PROCEDURE — 96365 THER/PROPH/DIAG IV INF INIT: CPT | Mod: XU

## 2020-02-07 PROCEDURE — 96366 THER/PROPH/DIAG IV INF ADDON: CPT

## 2020-02-07 PROCEDURE — 87015 SPECIMEN INFECT AGNT CONCNTJ: CPT | Performed by: PHYSICIAN ASSISTANT

## 2020-02-07 PROCEDURE — C9113 INJ PANTOPRAZOLE SODIUM, VIA: HCPCS | Performed by: PHYSICIAN ASSISTANT

## 2020-02-07 PROCEDURE — 85610 PROTHROMBIN TIME: CPT | Performed by: PHYSICIAN ASSISTANT

## 2020-02-07 PROCEDURE — 86850 RBC ANTIBODY SCREEN: CPT | Performed by: PHYSICIAN ASSISTANT

## 2020-02-07 PROCEDURE — 87493 C DIFF AMPLIFIED PROBE: CPT | Performed by: PHYSICIAN ASSISTANT

## 2020-02-07 PROCEDURE — 25800030 ZZH RX IP 258 OP 636: Performed by: PHYSICIAN ASSISTANT

## 2020-02-07 PROCEDURE — 80320 DRUG SCREEN QUANTALCOHOLS: CPT | Performed by: PHYSICIAN ASSISTANT

## 2020-02-07 PROCEDURE — 99285 EMERGENCY DEPT VISIT HI MDM: CPT | Mod: 25

## 2020-02-07 PROCEDURE — 83690 ASSAY OF LIPASE: CPT | Performed by: PHYSICIAN ASSISTANT

## 2020-02-07 PROCEDURE — 25000128 H RX IP 250 OP 636: Performed by: PHYSICIAN ASSISTANT

## 2020-02-07 PROCEDURE — 87046 STOOL CULTR AEROBIC BACT EA: CPT | Performed by: PHYSICIAN ASSISTANT

## 2020-02-07 PROCEDURE — 96367 TX/PROPH/DG ADDL SEQ IV INF: CPT

## 2020-02-07 PROCEDURE — 87045 FECES CULTURE AEROBIC BACT: CPT | Performed by: PHYSICIAN ASSISTANT

## 2020-02-07 PROCEDURE — 25000125 ZZHC RX 250: Performed by: PHYSICIAN ASSISTANT

## 2020-02-07 PROCEDURE — 74177 CT ABD & PELVIS W/CONTRAST: CPT | Mod: TC

## 2020-02-07 PROCEDURE — 86900 BLOOD TYPING SEROLOGIC ABO: CPT | Performed by: PHYSICIAN ASSISTANT

## 2020-02-07 PROCEDURE — 99285 EMERGENCY DEPT VISIT HI MDM: CPT | Mod: Z6 | Performed by: PHYSICIAN ASSISTANT

## 2020-02-07 PROCEDURE — 25500064 ZZH RX 255 OP 636: Performed by: PHYSICIAN ASSISTANT

## 2020-02-07 PROCEDURE — 85025 COMPLETE CBC W/AUTO DIFF WBC: CPT | Performed by: PHYSICIAN ASSISTANT

## 2020-02-07 PROCEDURE — 80053 COMPREHEN METABOLIC PANEL: CPT | Performed by: PHYSICIAN ASSISTANT

## 2020-02-07 PROCEDURE — 36415 COLL VENOUS BLD VENIPUNCTURE: CPT | Performed by: PHYSICIAN ASSISTANT

## 2020-02-07 PROCEDURE — 87899 AGENT NOS ASSAY W/OPTIC: CPT | Mod: 59 | Performed by: PHYSICIAN ASSISTANT

## 2020-02-07 PROCEDURE — 96375 TX/PRO/DX INJ NEW DRUG ADDON: CPT

## 2020-02-07 RX ORDER — LORAZEPAM 2 MG/ML
2 INJECTION INTRAMUSCULAR ONCE
Status: COMPLETED | OUTPATIENT
Start: 2020-02-07 | End: 2020-02-07

## 2020-02-07 RX ORDER — IOPAMIDOL 612 MG/ML
100 INJECTION, SOLUTION INTRAVASCULAR ONCE
Status: COMPLETED | OUTPATIENT
Start: 2020-02-07 | End: 2020-02-07

## 2020-02-07 RX ADMIN — SODIUM CHLORIDE 80 MG: 9 INJECTION, SOLUTION INTRAVENOUS at 17:45

## 2020-02-07 RX ADMIN — LORAZEPAM 2 MG: 2 INJECTION, SOLUTION INTRAMUSCULAR; INTRAVENOUS at 18:13

## 2020-02-07 RX ADMIN — FOLIC ACID: 5 INJECTION, SOLUTION INTRAMUSCULAR; INTRAVENOUS; SUBCUTANEOUS at 18:25

## 2020-02-07 RX ADMIN — IOPAMIDOL 100 ML: 612 INJECTION, SOLUTION INTRAVENOUS at 18:53

## 2020-02-07 NOTE — ED NOTES
Bed: ED06  Expected date: 2/7/20  Expected time:   Means of arrival: Ambulance  Comments:  [facility]

## 2020-02-08 ENCOUNTER — TRANSFERRED RECORDS (OUTPATIENT)
Dept: HEALTH INFORMATION MANAGEMENT | Facility: OTHER | Age: 55
End: 2020-02-08

## 2020-02-08 ENCOUNTER — TRANSFERRED RECORDS (OUTPATIENT)
Dept: HEALTH INFORMATION MANAGEMENT | Facility: CLINIC | Age: 55
End: 2020-02-08

## 2020-02-08 VITALS
OXYGEN SATURATION: 95 % | BODY MASS INDEX: 19.97 KG/M2 | TEMPERATURE: 98.7 F | DIASTOLIC BLOOD PRESSURE: 109 MMHG | HEART RATE: 88 BPM | WEIGHT: 120 LBS | RESPIRATION RATE: 20 BRPM | SYSTOLIC BLOOD PRESSURE: 156 MMHG

## 2020-02-08 LAB
ABO + RH BLD: NORMAL
ABO + RH BLD: NORMAL
ALT SERPL-CCNC: 60 IU/L (ref 6–31)
AST SERPL-CCNC: 167 IU/L (ref 10–40)
BLD GP AB SCN SERPL QL: NORMAL
BLOOD BANK CMNT PATIENT-IMP: NORMAL
C DIFF TOX B STL QL: NEGATIVE
CREAT SERPL-MCNC: 0.81 MG/DL (ref 0.4–1)
GFR SERPL CREATININE-BSD FRML MDRD: >60 ML/MIN/1.73M*2
GLUCOSE SERPL-MCNC: 93 MG/DL (ref 70–99)
INR PPP: 1.1 (ref 0.9–1.1)
POTASSIUM SERPL-SCNC: 2.9 MEQ/L (ref 3.4–5.1)
SPECIMEN EXP DATE BLD: NORMAL
SPECIMEN SOURCE: NORMAL

## 2020-02-08 PROCEDURE — 25000128 H RX IP 250 OP 636: Performed by: INTERNAL MEDICINE

## 2020-02-08 PROCEDURE — 96367 TX/PROPH/DG ADDL SEQ IV INF: CPT

## 2020-02-08 RX ORDER — CEFTRIAXONE SODIUM 1 G/50ML
1 INJECTION, SOLUTION INTRAVENOUS ONCE
Status: COMPLETED | OUTPATIENT
Start: 2020-02-08 | End: 2020-02-08

## 2020-02-08 RX ADMIN — CEFTRIAXONE SODIUM 1 G: 1 INJECTION, SOLUTION INTRAVENOUS at 04:13

## 2020-02-08 NOTE — ED NOTES
Stat doc called and notified that stool sample negative for c-dif. Await return call with room number and phone number.

## 2020-02-08 NOTE — ED NOTES
Pt loaded into [place] ambulance, report called to Sacha at [facility]. Pt en route.

## 2020-02-08 NOTE — ED NOTES
"Pt had requested \"something to eat, soup or something\". Provider verified he wants her NPO. Banana bag hung.  "

## 2020-02-08 NOTE — ED PROVIDER NOTES
History     Chief Complaint   Patient presents with     Abdominal Pain     Nausea, Vomiting, & Diarrhea     HPI  Nidhi Miguel is a 54 year old female who presents here with a history of alcoholism she drinks vodka daily she has been having coffee-ground emesis she is also had cherry red bloody stools.  Been ongoing about a week and a half.  She has epigastric pain as well.  No fever chills.  No headache or lightheadedness.  No weakness no chest pain or shortness of breath    Allergies:  Allergies   Allergen Reactions     Acetaminophen-Codeine Hives     Codeine Hives     Tylenol 3       Problem List:    Patient Active Problem List    Diagnosis Date Noted     Essential hypertension 06/20/2019     Priority: Medium     Major depressive disorder, recurrent, moderate (H) 06/20/2019     Priority: Medium     Asthma, intermittent 02/06/2018     Priority: Medium     Chemical dependency (H) 02/06/2018     Priority: Medium     Overview:   10/21/12, per d/w Dr Bell, pt has abused etoh for years, hx of meth/marij/cocaine abuse, at [facility] one year       Left middle cerebral artery stroke (H) 07/29/2016     Priority: Medium     Alcohol-induced pancreatitis 12/12/2014     Priority: Medium     Chronic neck pain 04/05/2013     Priority: Medium     Status post cervical spinal arthrodesis 03/29/2012     Priority: Medium     Overview:   IMO Update 10/11       Pseudoarthrosis of cervical spine (H) 02/02/2012     Priority: Medium     Other and unspecified alcohol dependence, episodic drinking behavior 09/13/2011     Priority: Medium     Overview:        Tobacco use disorder 03/12/2010     Priority: Medium     LTBI (latent tuberculosis infection) 03/03/2010     Priority: Medium     Overview:   Positive skin test 2002       Lumbago 11/15/2006     Priority: Medium     Overview:   IMO Update 10/11          Past Medical History:    Past Medical History:   Diagnosis Date     Acquired absence of both cervix and uterus       Acute bronchospasm      Allergic rhinitis      Cervicalgia      Low back pain      Mild intermittent asthma, uncomplicated      Other psychoactive substance dependence, uncomplicated (H)      Personal history of other medical treatment (CODE)      Personal history of other medical treatment (CODE)      Pneumonia due to other specified bacteria (H)      Sepsis (H)      Uncomplicated alcohol dependence (H)        Past Surgical History:    Past Surgical History:   Procedure Laterality Date     FUSION CERVICAL ANTERIOR ONE LEVEL      2/2012,C3-7 posterior instrumented fusion iwth lateral mass screws and cancellous bone and progenix     LUMPECTOMY BREAST      1998,Left, benign     OTHER SURGICAL HISTORY      1990s,[id number],COLPOSCOPY,Abnormal paps     OTHER SURGICAL HISTORY      2000,600000,OTHER,Right floating kidney     OTHER SURGICAL HISTORY      3/2010,[id number],CERVICAL DISKECTOMY,C5-6, subsequent osteomyelitis and deformity     OTHER SURGICAL HISTORY      2/2012,[id number],CERVICAL DISKECTOMY,C5-6 redo and C4-7 arthrodesis and graft and anterior placement of plate.       Family History:    Family History   Problem Relation Age of Onset     Coronary Artery Disease Father      Hypertension Father         Hypertension     Prostate Cancer Father      Diabetes No family hx of      Colon Cancer No family hx of      Breast Cancer No family hx of      Other Cancer No family hx of      Substance Abuse Father         Alcohol/Drug,alcoholic but quit     Substance Abuse Paternal Grandfather         Alcohol/Drug,emphysema chemically induced..     Hypertension Paternal Grandfather         Hypertension     Cancer Maternal Grandmother         Cancer,Pancreatic cancer       Social History:  Marital Status:  Single [1]  Social History     Tobacco Use     Smoking status: Current Every Day Smoker     Packs/day: 1.00     Years: 30.00     Pack years: 30.00     Types: Cigarettes     Start date: 1/1/1980     Smokeless tobacco: Never Used  "  Substance Use Topics     Alcohol use: Yes     Alcohol/week: 14.0 standard drinks     Comment: Alcoholic Drinks/day: 1 liter ETOH/day-now none 2015.  2016: \"a couple drinks/day\"     Drug use: No     Types: Other     Comment: Drug use: NoHistory of methamphetamine use        Medications:    acetaminophen (TYLENOL) 650 MG Suppository  albuterol (PROAIR HFA/PROVENTIL HFA/VENTOLIN HFA) 108 (90 Base) MCG/ACT inhaler  albuterol (PROVENTIL) (2.5 MG/3ML) 0.083% neb solution  amLODIPine (NORVASC) 5 MG tablet  budesonide-formoterol (SYMBICORT) 160-4.5 MCG/ACT Inhaler  buPROPion (WELLBUTRIN XL) 300 MG 24 hr tablet  ibuprofen (ADVIL/MOTRIN) 600 MG tablet  ipratropium - albuterol 0.5 mg/2.5 mg/3 mL (DUONEB) 0.5-2.5 (3) MG/3ML neb solution          Review of Systems  I did do a complete 10 point review of systems. Pertinent positives and negatives listed per HPI. All other systems have been reviewed and are negative.      Physical Exam   BP: (!) 163/111  Pulse: 82  Heart Rate: 96  Temp: 98.6  F (37  C)  Resp: 16  Weight: 54.4 kg (120 lb)  SpO2: 98 %      Physical Exam  Vitals signs and nursing note reviewed.   Constitutional:       General: She is not in acute distress.     Appearance: Normal appearance. She is not ill-appearing, toxic-appearing or diaphoretic.   HENT:      Head: Normocephalic and atraumatic.      Nose: Nose normal.      Mouth/Throat:      Mouth: Mucous membranes are moist.      Pharynx: Oropharynx is clear.   Eyes:      General: No scleral icterus.     Conjunctiva/sclera: Conjunctivae normal.   Neck:      Musculoskeletal: Neck supple.   Cardiovascular:      Rate and Rhythm: Normal rate.   Pulmonary:      Effort: Pulmonary effort is normal.      Breath sounds: Normal breath sounds.   Abdominal:      Palpations: Abdomen is soft.      Tenderness: There is abdominal tenderness. There is no guarding or rebound.   Musculoskeletal:      Right lower leg: No edema.      Left lower leg: No edema.   Skin:     General: " Skin is warm and dry.   Neurological:      General: No focal deficit present.      Mental Status: She is alert and oriented to person, place, and time.   Psychiatric:         Mood and Affect: Mood normal.         Behavior: Behavior normal.         Thought Content: Thought content normal.         Judgment: Judgment normal.         ED Course        Lipase is 600.  White count is normal.  LFTs are a bit elevated.  Bilirubin is normal.  INR is normal.  She is given a liter of saline she was given banana bag.  She was given 2 mg IV Protonix.  2 mg IV Ativan.  She is 0.31 but alcohol but she is sober clinically.  CBC reveals a normal hemoglobin of 13.4 she has had a very stable course and good blood pressure and hemodynamics here in the ED.  At this point time I did speak with the surgeon locally recommend transfer to [place] she is a central patient.  I acceptance from  the hospitalist.  CT abdomen pelvis reveals a transverse colitis and no pancreatitis surprisingly.  At this point time we are waiting for stool sample since he does not have a bed until the morning if she is C. difficile positive she received a negative single tonight but waiting a stool sample and C. difficile results.         Results for orders placed or performed during the hospital encounter of 02/07/20 (from the past 24 hour(s))   Alcohol ethyl   Result Value Ref Range    Ethanol g/dL 0.31 (H) 0.01 g/dL   CBC with platelets differential   Result Value Ref Range    WBC 4.2 4.0 - 11.0 10e9/L    RBC Count 3.63 (L) 3.8 - 5.2 10e12/L    Hemoglobin 13.4 11.7 - 15.7 g/dL    Hematocrit 37.6 35.0 - 47.0 %     (H) 78 - 100 fl    MCH 36.9 (H) 26.5 - 33.0 pg    MCHC 35.6 31.5 - 36.5 g/dL    RDW 13.2 10.0 - 15.0 %    Platelet Count 75 (L) 150 - 450 10e9/L    Diff Method Automated Method     % Neutrophils 64.4 %    % Lymphocytes 21.8 %    % Monocytes 11.0 %    % Eosinophils 1.2 %    % Basophils 1.4 %    % Immature Granulocytes 0.2 %    Nucleated RBCs  0 0 /100    Absolute Neutrophil 2.7 1.6 - 8.3 10e9/L    Absolute Lymphocytes 0.9 0.8 - 5.3 10e9/L    Absolute Monocytes 0.5 0.0 - 1.3 10e9/L    Absolute Eosinophils 0.1 0.0 - 0.7 10e9/L    Absolute Basophils 0.1 0.0 - 0.2 10e9/L    Abs Immature Granulocytes 0.0 0 - 0.4 10e9/L    Absolute Nucleated RBC 0.0    Comprehensive metabolic panel   Result Value Ref Range    Sodium 138 133 - 144 mmol/L    Potassium 3.0 (L) 3.4 - 5.3 mmol/L    Chloride 104 94 - 109 mmol/L    Carbon Dioxide 27 20 - 32 mmol/L    Anion Gap 7 3 - 14 mmol/L    Glucose 115 (H) 70 - 99 mg/dL    Urea Nitrogen 11 7 - 30 mg/dL    Creatinine 0.79 0.52 - 1.04 mg/dL    GFR Estimate 86 >60 mL/min/[1.73_m2]    GFR Estimate If Black >90 >60 mL/min/[1.73_m2]    Calcium 8.4 (L) 8.5 - 10.1 mg/dL    Bilirubin Total 0.6 0.2 - 1.3 mg/dL    Albumin 3.9 3.4 - 5.0 g/dL    Protein Total 7.3 6.8 - 8.8 g/dL    Alkaline Phosphatase 121 40 - 150 U/L    ALT 91 (H) 0 - 50 U/L     (H) 0 - 45 U/L   Lipase   Result Value Ref Range    Lipase 597 (H) 73 - 393 U/L   ABO/Rh type and screen   Result Value Ref Range    ABO A     RH(D) Pos     Antibody Screen Neg     Test Valid Only At [facility]        Specimen Expires 02/10/2020    INR   Result Value Ref Range    INR 1.02 0.86 - 1.14   CT Abdomen Pelvis w Contrast    Narrative    EXAMINATION: CT ABDOMEN PELVIS W CONTRAST, 2/7/2020 7:01 PM    TECHNIQUE:  Helical CT images from the lung bases through the  symphysis pubis were obtained  with IV contrast. Contrast dose: Isovue  300 100ml Given    COMPARISON: none    HISTORY: abd pain in alcoholic    FINDINGS:    There is dependent atelectasis at the lung bases.    The liver is free of masses or biliary ductal enlargement. Fatty  infiltration of the liver is seen. No calcified gallstones are seen.    The the spleen and pancreas appear normal.    The adrenal glands are normal.    The right and left kidneys are free of masses or hydronephrosis. There  is some cortical  scarring in the upper pole of the right kidney.    The periaortic lymph nodes are normal in caliber.    There is some bowel wall thickening seen in the colon most severe in  the transverse colon. This would be suspicious for colitis. The  appendix was visualized and appears normal    In the pelvis the bladder and rectum appear normal.    The regional skeleton is intact      Impression    IMPRESSION: Colitis most severe in the transverse colon.     ROSANNA COPELAND MD       Medications   sodium chloride 0.9 % 1,000 mL with Infuvite Adult 10 mL, thiamine 100 mg, folic acid 1 mg infusion ( Intravenous Stopped 2/7/20 2158)   pantoprazole (PROTONIX) 80 mg in sodium chloride 0.9 % 100 mL intermittent infusion (0 mg Intravenous Stopped 2/7/20 1805)   LORazepam (ATIVAN) injection 2 mg (2 mg Intravenous Given 2/7/20 1813)   sodium chloride (PF) 0.9% PF flush 60 mL (60 mLs Intravenous Given 2/7/20 1853)   iopamidol (ISOVUE-300) IV solution 61% 100 mL (100 mLs Intravenous Given 2/7/20 1853)       Assessments & Plan (with Medical Decision Making)     I have reviewed the nursing notes.    I have reviewed the findings, diagnosis, plan and need for follow up with the patient.  Transfer to [facility]    No medications on file       Final diagnoses:   Colitis   Gastrointestinal hemorrhage, unspecified gastrointestinal hemorrhage type   ETOHism (H)   Hematemesis with nausea   Epigastric pain       2/7/2020   [facility]

## 2020-02-08 NOTE — ED NOTES
Per [facility], a crew is available for transporting pt to [place] with ETA of 0630.

## 2020-02-08 NOTE — ED NOTES
Stat doc called asking on status of patient giving a stool sample. Requested to call them back with a result due to only having a bed available if the test is negative.

## 2020-02-09 LAB
ALT SERPL-CCNC: 51 IU/L (ref 6–31)
AST SERPL-CCNC: 127 IU/L (ref 10–40)
CREAT SERPL-MCNC: 0.74 MG/DL (ref 0.4–1)
GFR SERPL CREATININE-BSD FRML MDRD: >60 ML/MIN/1.73M*2
GLUCOSE SERPL-MCNC: 100 MG/DL (ref 70–99)
POTASSIUM SERPL-SCNC: 3.5 MEQ/L (ref 3.4–5.1)

## 2020-02-11 LAB
BACTERIA SPEC CULT: NORMAL
E COLI SXT1+2 STL IA: NORMAL
SPECIMEN SOURCE: NORMAL

## 2020-02-17 DIAGNOSIS — J45.20 INTERMITTENT ASTHMA WITHOUT COMPLICATION, UNSPECIFIED ASTHMA SEVERITY: ICD-10-CM

## 2020-02-21 RX ORDER — BUDESONIDE AND FORMOTEROL FUMARATE DIHYDRATE 160; 4.5 UG/1; UG/1
AEROSOL RESPIRATORY (INHALATION)
Qty: 11 G | Refills: 3 | Status: SHIPPED | OUTPATIENT
Start: 2020-02-21 | End: 2021-01-01

## 2020-02-21 NOTE — TELEPHONE ENCOUNTER
budesonide-formoterol (SYMBICORT) 160-4.5 MCG/ACT Inhaler      Last Written Prescription Date:  10/31/18  Last Fill Quantity: 1,   # refills: 11  Last Office Visit: 1/9/20  Future Office visit:       Routing refill request to provider for review/approval because:  pcp to advise

## 2020-03-13 NOTE — PROGRESS NOTES
Subjective     Nidhi Miguel is a 54 year old female who presents to clinic today for the following health issues:    HPI   Stomach issues       Duration: about a month     Description (location/character/radiation): vomiting, diarrhea for about a month-states it cleared up about 3 days ago    Intensity:  mild    Accompanying signs and symptoms: fever, tinnitis, dizzy, and see description ^    History (similar episodes/previous evaluation): was in the hospital 2/6/2020 for colitis    Precipitating or alleviating factors: None    Therapies tried and outcome: None- just current medications    Last ETOH drink was yesterday    She was suppose to have a colonoscopy after hospital discharge       Patient Active Problem List   Diagnosis     Alcohol dependence with intoxication (H)     Asthma, intermittent     Chemical dependency (H)     Chronic neck pain     Alcohol-induced pancreatitis     Tobacco use disorder     Left middle cerebral artery stroke (H)     LTBI (latent tuberculosis infection)     Lumbago     Pseudoarthrosis of cervical spine (H)     Status post cervical spinal arthrodesis     Essential hypertension     Major depressive disorder, recurrent, moderate (H)     History of cerebrovascular accident (CVA) in adulthood     Moderate alcohol dependence in early remission (H)     Acute colitis     Alcoholic hepatitis without ascites     Coffee ground emesis     Hypokalemia     Hypomagnesemia     Macrocytic anemia     Thrombocytopenia (H)     Past Surgical History:   Procedure Laterality Date     FUSION CERVICAL ANTERIOR ONE LEVEL      2/2012,C3-7 posterior instrumented fusion iwth lateral mass screws and cancellous bone and progenix     LUMPECTOMY BREAST      1998,Left, benign     OTHER SURGICAL HISTORY      1990s,[id number],COLPOSCOPY,Abnormal paps     OTHER SURGICAL HISTORY      2000,600000,OTHER,Right floating kidney     OTHER SURGICAL HISTORY      3/2010,[id number],CERVICAL DISKECTOMY,C5-6, subsequent osteomyelitis  "and deformity     OTHER SURGICAL HISTORY      2/2012,[id number],CERVICAL DISKECTOMY,C5-6 redo and C4-7 arthrodesis and graft and anterior placement of plate.       Social History     Tobacco Use     Smoking status: Current Every Day Smoker     Packs/day: 1.00     Years: 30.00     Pack years: 30.00     Types: Cigarettes     Start date: 1/1/1980     Smokeless tobacco: Never Used     Tobacco comment: denies quit plan 3/16/2020   Substance Use Topics     Alcohol use: Yes     Alcohol/week: 14.0 standard drinks     Comment: Alcoholic Drinks/day: 1 liter ETOH/day-now none 2015.  2016: \"a couple drinks/day\"     Family History   Problem Relation Age of Onset     Coronary Artery Disease Father      Hypertension Father         Hypertension     Prostate Cancer Father      Diabetes No family hx of      Colon Cancer No family hx of      Breast Cancer No family hx of      Other Cancer No family hx of      Substance Abuse Father         Alcohol/Drug,alcoholic but quit     Substance Abuse Paternal Grandfather         Alcohol/Drug,emphysema chemically induced..     Hypertension Paternal Grandfather         Hypertension     Cancer Maternal Grandmother         Cancer,Pancreatic cancer         Current Outpatient Medications   Medication Sig Dispense Refill     albuterol (PROAIR HFA/PROVENTIL HFA/VENTOLIN HFA) 108 (90 Base) MCG/ACT inhaler Inhale 2 puffs into the lungs every 4 hours as needed 1 Inhaler 11     albuterol (PROVENTIL) (2.5 MG/3ML) 0.083% neb solution Inhale 2.5 mg into the lungs 4 times daily as needed       amLODIPine (NORVASC) 5 MG tablet        BUDESONIDE-FORMOTEROL 160-4.5 MCG/ACT IN Inhaler INHALE 2 PUFFS BY MOUTH TWICE DAILY 11 g 3     fluconazole (DIFLUCAN) 150 MG tablet Take 1 tablet (150 mg) by mouth once for 1 dose 2 tablet 0     folic acid (FOLVITE) 1 MG tablet        ibuprofen (ADVIL/MOTRIN) 600 MG tablet Take 1 tablet by mouth daily       ipratropium - albuterol 0.5 mg/2.5 mg/3 mL (DUONEB) 0.5-2.5 (3) MG/3ML neb " solution Inhale 1 vial (3 mLs) into the lungs 4 times daily as needed 360 mL 11     omeprazole (PRILOSEC) 40 MG DR capsule        sucralfate (CARAFATE) 1 GM tablet        Allergies   Allergen Reactions     Acetaminophen-Codeine Hives     Codeine Hives     Tylenol 3       Reviewed and updated as needed this visit by Provider  Allergies  Meds  Problems  Med Hx  Surg Hx         Review of Systems   ROS COMP: Constitutional, HEENT, cardiovascular, pulmonary, gi and gu systems are negative, except as otherwise noted. +fever. +dizziness. +epigastric discomfort. +nausea. -blood in emesis or stool      Objective    BP 98/64   Pulse 106   Temp 99  F (37.2  C)   Wt 55.8 kg (123 lb)   SpO2 98%   BMI 20.47 kg/m    Body mass index is 20.47 kg/m . see nurses note for orthostatic BP's.     Physical Exam   GENERAL: healthy, alert and no distress  HENT: ear canals and TM's normal, nose and mouth without ulcers or lesions  NECK: no adenopathy, no asymmetry, masses, or scars and thyroid normal to palpation  RESP: lungs clear to auscultation - no rales, rhonchi or wheezes  CV: regular rate and rhythm, normal S1 S2, no S3 or S4, no murmur, click or rub, no peripheral edema and peripheral pulses strong  ABDOMEN: soft, nontender, no hepatosplenomegaly, no masses and bowel sounds normal. +epigastric tenderness  MS: no gross musculoskeletal defects noted, no edema  SKIN: no suspicious lesions or rashes  NEURO: Normal strength and tone, mentation intact and speech normal. CN II-XII grossly intact  PSYCH: mentation appears normal, affect normal/bright    Diagnostic Test Results:  Labs reviewed in Epic         EKG Interpretation:      Interpreted by Arlene Becker NP  Time reviewed: 1450  Symptoms at time of EKG: intermittent dizziness   Rhythm: Normal sinus   Rate: Normal  Axis: Normal  Ectopy: None  Conduction: Normal  ST Segments/ T Waves: No acute ischemic changes and ST abnormality, possible digitalis effect  Q Waves:  None  Comparison to prior: Unchanged from 8-    Clinical Impression: abnormal EKG. Unchanged from previous EKG      Results for orders placed or performed in visit on 03/16/20   CBC with platelets and differential     Status: Abnormal   Result Value Ref Range    WBC 3.9 (L) 4.0 - 11.0 10e9/L    RBC Count 2.92 (L) 3.8 - 5.2 10e12/L    Hemoglobin 11.2 (L) 11.7 - 15.7 g/dL    Hematocrit 31.9 (L) 35.0 - 47.0 %     (H) 78 - 100 fl    MCH 38.4 (H) 26.5 - 33.0 pg    MCHC 35.1 31.5 - 36.5 g/dL    RDW 13.6 10.0 - 15.0 %    Platelet Count 149 (L) 150 - 450 10e9/L    % Neutrophils 60.1 %    % Lymphocytes 22.3 %    % Monocytes 15.3 %    % Eosinophils 1.0 %    % Basophils 1.3 %    Absolute Neutrophil 2.4 1.6 - 8.3 10e9/L    Absolute Lymphocytes 0.9 0.8 - 5.3 10e9/L    Absolute Monocytes 0.6 0.0 - 1.3 10e9/L    Absolute Eosinophils 0.0 0.0 - 0.7 10e9/L    Absolute Basophils 0.1 0.0 - 0.2 10e9/L    Diff Method Automated Method    *UA reflex to Microscopic and Culture - [facility]     Status: Abnormal    Specimen: Midstream Urine   Result Value Ref Range    Color Urine Yellow     Appearance Urine Slightly Cloudy     Glucose Urine Negative NEG^Negative mg/dL    Bilirubin Urine Negative NEG^Negative    Ketones Urine Trace (A) NEG^Negative mg/dL    Specific Gravity Urine 1.020 1.003 - 1.035    Blood Urine Negative NEG^Negative    pH Urine 5.5 5.0 - 7.0 pH    Protein Albumin Urine Negative NEG^Negative mg/dL    Urobilinogen Urine 0.2 0.2 - 1.0 EU/dL    Nitrite Urine Negative NEG^Negative    Leukocyte Esterase Urine Small (A) NEG^Negative    Source Midstream Urine    Urine Microscopic     Status: Abnormal   Result Value Ref Range    WBC Urine 5-10 (A) OTO5^0 - 5 /HPF    RBC Urine NONE SEEN OTO2^O - 2 /HPF    Squamous Epithelial /LPF Urine Few FEW^Few /LPF    Bacteria Urine Few (A) NEG^Negative /HPF    Yeast Urine Many (A) NEG^Negative /HPF     Urine culture ordered.     HGB was 10.2 when discharged from the hospital  in 2/2020    Assessment & Plan   Assessment      Plan    (R19.7) Diarrhea, unspecified type  Comment: check labs. diarrhea has improved  Plan: CBC with platelets and differential,         Comprehensive metabolic panel, Clostridium         difficile Toxin B PCR, Stool culture SSCE, Ova         and Parasite Screen, *UA reflex to Microscopic         and Culture - [facility], Lipase, CT         Abdomen Pelvis w/o & w Contrast            (K52.9) Colitis  Comment: CT ordered and general surgery for colonoscopy   Plan: GENERAL SURG ADULT REFERRAL, CT Abdomen Pelvis         w/o & w Contrast            (R42) Dizziness  Comment: unchanged EKG. Normal orthostatic BP's   Plan: EKG 12-lead complete w/read - (Clinic         Performed)            (R10.13) Epigastric pain  Comment: CT ordered. On PPI and carafate  Plan: CT Abdomen Pelvis w/o & w Contrast      (R82.90) Abnormal urine findings  Comment: add diflucan for yeast in urine. culture pending  Plan: Urine Culture Aerobic Bacterial, fluconazole         (DIFLUCAN) 150 MG tablet                Tobacco Cessation:   reports that she has been smoking cigarettes. She started smoking about 40 years ago. She has a 30.00 pack-year smoking history. She has never used smokeless tobacco.  Tobacco Cessation Action Plan: Information offered: Patient not interested at this time        See Patient Instructions    Return if symptoms worsen or fail to improve.    Arlene Becker NP  [facility]

## 2020-03-16 ENCOUNTER — OFFICE VISIT (OUTPATIENT)
Dept: FAMILY MEDICINE | Facility: OTHER | Age: 55
End: 2020-03-16
Attending: NURSE PRACTITIONER
Payer: COMMERCIAL

## 2020-03-16 VITALS
BODY MASS INDEX: 20.47 KG/M2 | TEMPERATURE: 99 F | WEIGHT: 123 LBS | OXYGEN SATURATION: 98 % | DIASTOLIC BLOOD PRESSURE: 64 MMHG | SYSTOLIC BLOOD PRESSURE: 98 MMHG | HEART RATE: 106 BPM

## 2020-03-16 DIAGNOSIS — R19.7 DIARRHEA, UNSPECIFIED TYPE: Primary | ICD-10-CM

## 2020-03-16 DIAGNOSIS — K52.9 COLITIS: ICD-10-CM

## 2020-03-16 DIAGNOSIS — R82.90 ABNORMAL URINE FINDINGS: ICD-10-CM

## 2020-03-16 DIAGNOSIS — R42 DIZZINESS: ICD-10-CM

## 2020-03-16 DIAGNOSIS — R10.13 EPIGASTRIC PAIN: ICD-10-CM

## 2020-03-16 PROBLEM — K92.0 COFFEE GROUND EMESIS: Status: ACTIVE | Noted: 2020-02-07

## 2020-03-16 PROBLEM — E87.6 HYPOKALEMIA: Status: ACTIVE | Noted: 2020-02-09

## 2020-03-16 PROBLEM — E83.42 HYPOMAGNESEMIA: Status: ACTIVE | Noted: 2020-02-09

## 2020-03-16 PROBLEM — D53.9 MACROCYTIC ANEMIA: Status: ACTIVE | Noted: 2020-02-09

## 2020-03-16 PROBLEM — D69.6 THROMBOCYTOPENIA (H): Status: ACTIVE | Noted: 2020-02-09

## 2020-03-16 PROBLEM — K70.10 ALCOHOLIC HEPATITIS WITHOUT ASCITES (H): Status: ACTIVE | Noted: 2020-02-09

## 2020-03-16 LAB
ALBUMIN UR-MCNC: NEGATIVE MG/DL
APPEARANCE UR: ABNORMAL
BACTERIA #/AREA URNS HPF: ABNORMAL /HPF
BILIRUB UR QL STRIP: NEGATIVE
COLOR UR AUTO: YELLOW
GLUCOSE UR STRIP-MCNC: NEGATIVE MG/DL
HGB UR QL STRIP: NEGATIVE
KETONES UR STRIP-MCNC: ABNORMAL MG/DL
LEUKOCYTE ESTERASE UR QL STRIP: ABNORMAL
NITRATE UR QL: NEGATIVE
NON-SQ EPI CELLS #/AREA URNS LPF: ABNORMAL /LPF
PH UR STRIP: 5.5 PH (ref 5–7)
RBC #/AREA URNS AUTO: ABNORMAL /HPF
SOURCE: ABNORMAL
SP GR UR STRIP: 1.02 (ref 1–1.03)
UROBILINOGEN UR STRIP-ACNC: 0.2 EU/DL (ref 0.2–1)
WBC #/AREA URNS AUTO: ABNORMAL /HPF
YEAST #/AREA URNS HPF: ABNORMAL /HPF

## 2020-03-16 PROCEDURE — 84443 ASSAY THYROID STIM HORMONE: CPT | Mod: ZL | Performed by: NURSE PRACTITIONER

## 2020-03-16 PROCEDURE — 83690 ASSAY OF LIPASE: CPT | Mod: ZL | Performed by: NURSE PRACTITIONER

## 2020-03-16 PROCEDURE — 93010 ELECTROCARDIOGRAM REPORT: CPT | Performed by: INTERNAL MEDICINE

## 2020-03-16 PROCEDURE — 93005 ELECTROCARDIOGRAM TRACING: CPT

## 2020-03-16 PROCEDURE — 36415 COLL VENOUS BLD VENIPUNCTURE: CPT | Mod: ZL | Performed by: NURSE PRACTITIONER

## 2020-03-16 PROCEDURE — G0463 HOSPITAL OUTPT CLINIC VISIT: HCPCS

## 2020-03-16 PROCEDURE — 80053 COMPREHEN METABOLIC PANEL: CPT | Mod: ZL | Performed by: NURSE PRACTITIONER

## 2020-03-16 PROCEDURE — 81001 URINALYSIS AUTO W/SCOPE: CPT | Mod: ZL | Performed by: NURSE PRACTITIONER

## 2020-03-16 PROCEDURE — 99214 OFFICE O/P EST MOD 30 MIN: CPT | Mod: 25 | Performed by: NURSE PRACTITIONER

## 2020-03-16 PROCEDURE — G0463 HOSPITAL OUTPT CLINIC VISIT: HCPCS | Mod: 25

## 2020-03-16 PROCEDURE — 83735 ASSAY OF MAGNESIUM: CPT | Mod: ZL | Performed by: NURSE PRACTITIONER

## 2020-03-16 PROCEDURE — 85025 COMPLETE CBC W/AUTO DIFF WBC: CPT | Mod: ZL | Performed by: NURSE PRACTITIONER

## 2020-03-16 PROCEDURE — 87086 URINE CULTURE/COLONY COUNT: CPT | Mod: ZL | Performed by: NURSE PRACTITIONER

## 2020-03-16 RX ORDER — FLUCONAZOLE 150 MG/1
150 TABLET ORAL ONCE
Qty: 2 TABLET | Refills: 0 | Status: SHIPPED | OUTPATIENT
Start: 2020-03-16 | End: 2020-04-04

## 2020-03-16 RX ORDER — CIPROFLOXACIN 500 MG/1
TABLET, FILM COATED ORAL
COMMUNITY
Start: 2020-02-10 | End: 2020-03-16

## 2020-03-16 RX ORDER — SUCRALFATE 1 G/1
TABLET ORAL
Status: ON HOLD | COMMUNITY
Start: 2020-02-10 | End: 2020-04-05

## 2020-03-16 RX ORDER — FOLIC ACID 1 MG/1
TABLET ORAL
COMMUNITY
Start: 2020-02-10 | End: 2020-04-04

## 2020-03-16 RX ORDER — OXYCODONE HYDROCHLORIDE 5 MG/1
TABLET ORAL
COMMUNITY
Start: 2020-02-10 | End: 2020-03-16

## 2020-03-16 RX ORDER — THIAMINE MONONITRATE (VIT B1) 100 MG
TABLET ORAL
COMMUNITY
Start: 2020-02-10 | End: 2020-03-16

## 2020-03-16 RX ORDER — OMEPRAZOLE 40 MG/1
CAPSULE, DELAYED RELEASE ORAL
COMMUNITY
Start: 2020-02-10 | End: 2020-04-04

## 2020-03-16 RX ORDER — METRONIDAZOLE 500 MG/1
TABLET ORAL
COMMUNITY
Start: 2020-02-10 | End: 2020-03-16

## 2020-03-16 ASSESSMENT — PAIN SCALES - GENERAL: PAINLEVEL: NO PAIN (0)

## 2020-03-16 NOTE — LETTER
My Depression Action Plan  Name: Nidhi Miguel   Date of Birth 1965  Date: 3/13/2020    My doctor: Pam Acuña   My clinic: [address]  [facility] [address]  [phone]          GREEN    ZONE   Good Control    What it looks like:     Things are going generally well. You have normal ups and downs. You may even feel depressed from time to time, but bad moods usually last less than a day.   What you need to do:  1. Continue to care for yourself (see self care plan)  2. Check your depression survival kit and update it as needed  3. Follow your physician s recommendations including any medication.  4. Do not stop taking medication unless you consult with your physician first.           YELLOW         ZONE Getting Worse    What it looks like:     Depression is starting to interfere with your life.     It may be hard to get out of bed; you may be starting to isolate yourself from others.    Symptoms of depression are starting to last most all day and this has happened for several days.     You may have suicidal thoughts but they are not constant.   What you need to do:     1. Call your care team. Your response to treatment will improve if you keep your care team informed of your progress. Yellow periods are signs an adjustment may need to be made.     2. Continue your self-care.  Just get dressed and ready for the day.  Don't give yourself time to talk yourself out of it.    3. Talk to someone in your support network.    4. Open up your Depression Self-Care Plan/Wellness Kit.           RED    ZONE Medical Alert - Get Help    What it looks like:     Depression is seriously interfering with your life.     You may experience these or other symptoms: You can t get out of bed most days, can t work or engage in other necessary activities, you have trouble taking care of basic hygiene, or basic responsibilities, thoughts of suicide or death that will not go away,  self-injurious behavior.     What you need to do:  1. Call your care team and request a same-day appointment. If they are not available (weekends or after hours) call your local crisis line, emergency room or 911.            Depression Self-Care Plan / Wellness Kit    Self-Care for Depression  Here s the deal. Your body and mind are really not as separate as most people think.  What you do and think affects how you feel and how you feel influences what you do and think. This means if you do things that people who feel good do, it will help you feel better.  Sometimes this is all it takes.  There is also a place for medication and therapy depending on how severe your depression is, so be sure to consult with your medical provider and/ or Behavioral Health Consultant if your symptoms are worsening or not improving.     In order to better manage my stress, I will:    Exercise  Get some form of exercise, every day. This will help reduce pain and release endorphins, the  feel good  chemicals in your brain. This is almost as good as taking antidepressants!  This is not the same as joining a gym and then never going! (they count on that by the way ) It can be as simple as just going for a walk or doing some gardening, anything that will get you moving.      Hygiene   Maintain good hygiene (get out of bed in the morning, make your bed, brush your teeth, take a shower, and get dressed like you were going to work, even if you are unemployed).  If your clothes don't fit try to get ones that do.    Diet  Strive to eat foods that are good for me, drink plenty of water, and avoid excessive sugar, caffeine, alcohol, and other mood-altering substances.  Some foods that are helpful in depression are: complex carbohydrates, B vitamins, flaxseed, fish or fish oil, fresh fruits and vegetables.    Psychotherapy  Agree to participate in Individual Therapy (if recommended).    Medication  If prescribed medications, I agree to take them.   Missing doses can result in serious side effects.  I understand that drinking alcohol, or other illicit drug use, may cause potential side effects.  I will not stop my medication abruptly without first discussing it with my provider.    Staying Connected With Others  Stay in touch with my friends, family members, and my primary care provider/team.    Use your imagination  Be creative.  We all have a creative side; it doesn t matter if it s oil painting, sand castles, or mud pies! This will also kick up the endorphins.    Witness Beauty  (AKA stop and smell the roses) Take a look outside, even in mid-winter. Notice colors, textures. Watch the squirrels and birds.     Service to others  Be of service to others.  There is always someone else in need.  By helping others we can  get out of ourselves  and remember the really important things.  This also provides opportunities for practicing all the other parts of the program.    Humor  Laugh and be silly!  Adjust your TV habits for less news and crime-drama and more comedy.    Control your stress  Try breathing deep, massage therapy, biofeedback, and meditation. Find time to relax each day.     Crisis Text Line  http://www.crisistextline.org    The Crisis Text Line serves anyone, in any type of crisis, providing access to free, 24/7 support and information via the medium people already use and trust:    Here's how it works:  1.  Text 859-979 from anywhere in the [place], anytime, about any type of crisis.  2.  A live, trained Crisis Counselor receives the text and responds quickly.  3.  The volunteer Crisis Counselor will help you move from a 'hot moment to a cool moment'.    My support system    Clinic Contact:  Phone number:    Contact 1:  Phone number:    Contact 2:  Phone number:    Quaker/:  Phone number:    Therapist:  Phone number:    Local crisis center:    Phone number:    Other community support:  Phone number:

## 2020-03-16 NOTE — NURSING NOTE
"Chief Complaint   Patient presents with     GI Problem       Initial BP 98/64   Pulse 106   Temp 99  F (37.2  C)   Wt 55.8 kg (123 lb)   SpO2 98%   BMI 20.47 kg/m   Estimated body mass index is 20.47 kg/m  as calculated from the following:    Height as of 10/31/18: 1.651 m (5' 5\").    Weight as of this encounter: 55.8 kg (123 lb).  Medication Reconciliation: complete  Adrianna Mancuso  "

## 2020-03-16 NOTE — PATIENT INSTRUCTIONS
Patient Education     Abdominal Pain    Abdominal pain is pain in the stomach or belly area. Everyone has this pain from time to time. In many cases it goes away on its own. But abdominal pain can sometimes be due to a serious problem, such as appendicitis. So it s important to know when to get help.  Causes of abdominal pain  There are many possible causes of abdominal pain. Common causes in adults include:    Constipation, diarrhea, or gas    Stomach acid flowing back up into the esophagus (acid reflux or heartburn)    Severe acid reflux, called GERD (gastroesophageal reflux disease)    A sore in the lining of the stomach or small intestine (peptic ulcer)    Inflammation of the gallbladder, liver, or pancreas    Gallstones or kidney stones    Appendicitis     Intestinal blockage     An internal organ pushing through a muscle or other tissue (hernia)    Urinary tract infections    In women, menstrual cramps, fibroids, ovarian cysts, pelvic inflammatory disease, or endometriosis    Inflammation or infection of the intestines, including Crohn's disease and ulcerative colitis    Irritable bowel syndrome  Diagnosing the cause of abdominal pain  Your healthcare provider will give you a physical exam help find the cause of your pain. If needed, you will have tests. Belly pain has many possible causes. So it can be hard to find the reason for your pain. Giving details about your pain can help. Tell your provider where and when you feel the pain, and what makes it better or worse. Also let your provider know if you have other symptoms such as:    Fever    Tiredness    Upset stomach (nausea)    Vomiting    Changes in bathroom habits    Blood in the stool or black, tarry stool    Weight loss that you can't explain (involuntary weight loss?)  Also report any family history of stomach or intestinal problems, or cancers. Tell your provider about all your alcohol use and drug use. Tell your provider about all medicines you use,  including herbs, vitamins, and supplements.   Treating abdominal pain  Some causes of pain need emergency medical treatment right away. These include appendicitis or a bowel blockage. Other problems can be treated with rest, fluids, or medicines. Your healthcare provider can give you specific instructions for treatment or self-care based on what is causing your pain.     If you have vomiting or diarrhea, sip water or other clear fluids. When you are ready to eat solid foods again, start with small amounts of easy-to-digest, low-fat foods. These include apple sauce, toast, or crackers.  When to get medical care  Call 911 or go to the hospital right away if you:    Can t pass stool and are vomiting    Are vomiting blood or have bloody diarrhea or black, tarry diarrhea    Have chest, neck, or shoulder pain    Feel like you might pass out    Have pain in your shoulder blades with nausea    Have sudden, severe belly pain    Have new, severe pain unlike any you have felt before    Have a belly that is rigid, hard, and hurts to touch  Call your healthcare provider if you have:    Pain for more than 5 days    Bloating for more than 2 days    Diarrhea for more than 5 days    A fever of 100.4 F (38 C) or higher, or as directed by your healthcare provider    Pain that gets worse    Weight loss for no reason    Continued lack of appetite    Blood in your stool  How to prevent abdominal pain  Here are some tips to help prevent abdominal pain:    Eat smaller amounts of food at each meal.    Don't eat greasy, fried, or other high-fat foods.    Don't eat foods that give you gas.    Exercise regularly.    Drink plenty of fluids.  To help prevent GERD symptoms:    Quit smoking.    Reduce alcohol and foods that increase stomach acid.    Don't use aspirin or over-the-counter pain and fever medicines, if possible. This includes nonsteroidal anti-inflammatory drugs (NSAIDs).    Lose excess weight.    Finish eating at least 2 hours before  you go to bed or lie down.    Raise the head of your bed.  Date Last Reviewed: 7/1/2016 2000-2019 [employer]. [address] [place] [address], [place] [address]. All rights reserved. This information is not intended as a substitute for professional medical care. Always follow your healthcare professional's instructions.    Continue all medications.   CT scan order. Please call to schedule.   Increase fluid intake.   Follow up if not improving.

## 2020-03-17 LAB
ALBUMIN SERPL-MCNC: 3.2 G/DL (ref 3.4–5)
ALP SERPL-CCNC: 188 U/L (ref 40–150)
ALT SERPL W P-5'-P-CCNC: 105 U/L (ref 0–50)
ANION GAP SERPL CALCULATED.3IONS-SCNC: 6 MMOL/L (ref 3–14)
AST SERPL W P-5'-P-CCNC: 159 U/L (ref 0–45)
BASOPHILS # BLD AUTO: 0.1 10E9/L (ref 0–0.2)
BASOPHILS NFR BLD AUTO: 1.3 %
BILIRUB SERPL-MCNC: 0.6 MG/DL (ref 0.2–1.3)
BUN SERPL-MCNC: 12 MG/DL (ref 7–30)
CALCIUM SERPL-MCNC: 8.4 MG/DL (ref 8.5–10.1)
CHLORIDE SERPL-SCNC: 107 MMOL/L (ref 94–109)
CO2 SERPL-SCNC: 27 MMOL/L (ref 20–32)
CREAT SERPL-MCNC: 0.97 MG/DL (ref 0.52–1.04)
DIFFERENTIAL METHOD BLD: ABNORMAL
EOSINOPHIL # BLD AUTO: 0 10E9/L (ref 0–0.7)
EOSINOPHIL NFR BLD AUTO: 1 %
ERYTHROCYTE [DISTWIDTH] IN BLOOD BY AUTOMATED COUNT: 13.6 % (ref 10–15)
GFR SERPL CREATININE-BSD FRML MDRD: 67 ML/MIN/{1.73_M2}
GLUCOSE SERPL-MCNC: 109 MG/DL (ref 70–99)
HCT VFR BLD AUTO: 31.9 % (ref 35–47)
HGB BLD-MCNC: 11.2 G/DL (ref 11.7–15.7)
LIPASE SERPL-CCNC: 522 U/L (ref 73–393)
LYMPHOCYTES # BLD AUTO: 0.9 10E9/L (ref 0.8–5.3)
LYMPHOCYTES NFR BLD AUTO: 22.3 %
MACROCYTES BLD QL SMEAR: PRESENT
MAGNESIUM SERPL-MCNC: 1.4 MG/DL (ref 1.6–2.3)
MCH RBC QN AUTO: 38.4 PG (ref 26.5–33)
MCHC RBC AUTO-ENTMCNC: 35.1 G/DL (ref 31.5–36.5)
MCV RBC AUTO: 109 FL (ref 78–100)
MONOCYTES # BLD AUTO: 0.6 10E9/L (ref 0–1.3)
MONOCYTES NFR BLD AUTO: 15.3 %
NEUTROPHILS # BLD AUTO: 2.3 10E9/L (ref 1.6–8.3)
NEUTROPHILS NFR BLD AUTO: 60.1 %
PLATELET # BLD AUTO: 149 10E9/L (ref 150–450)
PLATELET # BLD EST: ABNORMAL 10*3/UL
POTASSIUM SERPL-SCNC: 3.6 MMOL/L (ref 3.4–5.3)
PROT SERPL-MCNC: 6.3 G/DL (ref 6.8–8.8)
RBC # BLD AUTO: 2.92 10E12/L (ref 3.8–5.2)
RBC MORPH BLD: ABNORMAL
SODIUM SERPL-SCNC: 140 MMOL/L (ref 133–144)
TSH SERPL DL<=0.005 MIU/L-ACNC: 0.52 MU/L (ref 0.4–4)
WBC # BLD AUTO: 3.9 10E9/L (ref 4–11)

## 2020-03-17 ASSESSMENT — ASTHMA QUESTIONNAIRES: ACT_TOTALSCORE: 13

## 2020-03-18 LAB
BACTERIA SPEC CULT: ABNORMAL
Lab: ABNORMAL
SPECIMEN SOURCE: ABNORMAL

## 2020-03-23 ENCOUNTER — TELEPHONE (OUTPATIENT)
Dept: FAMILY MEDICINE | Facility: OTHER | Age: 55
End: 2020-03-23

## 2020-03-23 ENCOUNTER — TRANSFERRED RECORDS (OUTPATIENT)
Dept: OTHER | Facility: HOSPITAL | Age: 55
End: 2020-03-23

## 2020-03-23 DIAGNOSIS — E83.42 HYPOMAGNESEMIA: Primary | ICD-10-CM

## 2020-03-23 NOTE — TELEPHONE ENCOUNTER
Patient calling back regarding lab results.Notified of provider's note:  Notes recorded by Arlene Becker NP on 3/18/2020 at 2:50 PM CDT   Lipase for pancreas is elevated as well as liver function tests. Urine is clear without infection. If she is still having abdominal pain, I recommend that she go to the ER. She needs to stop drinking alcohol. Magnesium level is low at 1.4. She should be on a magnesium supplement. If she is ok with me prescribing, I will send it to the pharmacy for her.     Patient would like prescription sent to [facility].

## 2020-03-24 NOTE — TELEPHONE ENCOUNTER
"Notified prescription was sent to pharmacy.  Patient states her abdominal pain is \" getting better\". Reports pain 4/10. States she has no further concerns at this time.  "

## 2020-03-31 ENCOUNTER — TELEPHONE (OUTPATIENT)
Dept: CT IMAGING | Facility: HOSPITAL | Age: 55
End: 2020-03-31

## 2020-04-04 ENCOUNTER — HOSPITAL ENCOUNTER (OUTPATIENT)
Facility: HOSPITAL | Age: 55
Setting detail: OBSERVATION
Discharge: HOME | End: 2020-04-05
Attending: NURSE PRACTITIONER | Admitting: INTERNAL MEDICINE
Payer: COMMERCIAL

## 2020-04-04 ENCOUNTER — APPOINTMENT (OUTPATIENT)
Dept: CT IMAGING | Facility: HOSPITAL | Age: 55
End: 2020-04-04
Attending: NURSE PRACTITIONER
Payer: COMMERCIAL

## 2020-04-04 DIAGNOSIS — F10.10 ALCOHOL ABUSE: ICD-10-CM

## 2020-04-04 DIAGNOSIS — K92.0 HEMATEMESIS: ICD-10-CM

## 2020-04-04 DIAGNOSIS — K92.0 HEMATEMESIS WITH NAUSEA: ICD-10-CM

## 2020-04-04 DIAGNOSIS — R10.13 EPIGASTRIC PAIN: ICD-10-CM

## 2020-04-04 PROBLEM — E86.0 DEHYDRATION: Status: ACTIVE | Noted: 2020-04-04

## 2020-04-04 LAB
ALBUMIN SERPL-MCNC: 3 G/DL (ref 3.4–5)
ALBUMIN UR-MCNC: NEGATIVE MG/DL
ALP SERPL-CCNC: 208 U/L (ref 40–150)
ALT SERPL W P-5'-P-CCNC: 65 U/L (ref 0–50)
ANION GAP SERPL CALCULATED.3IONS-SCNC: 5 MMOL/L (ref 3–14)
APPEARANCE UR: CLEAR
AST SERPL W P-5'-P-CCNC: 89 U/L (ref 0–45)
BASOPHILS # BLD AUTO: 0.1 10E9/L (ref 0–0.2)
BASOPHILS NFR BLD AUTO: 2.3 %
BILIRUB SERPL-MCNC: 0.8 MG/DL (ref 0.2–1.3)
BILIRUB UR QL STRIP: NEGATIVE
BUN SERPL-MCNC: 11 MG/DL (ref 7–30)
CALCIUM SERPL-MCNC: 8.4 MG/DL (ref 8.5–10.1)
CHLORIDE SERPL-SCNC: 107 MMOL/L (ref 94–109)
CO2 SERPL-SCNC: 26 MMOL/L (ref 20–32)
COLOR UR AUTO: ABNORMAL
CREAT SERPL-MCNC: 0.72 MG/DL (ref 0.52–1.04)
CRP SERPL-MCNC: <2.9 MG/L (ref 0–8)
DIFFERENTIAL METHOD BLD: ABNORMAL
EOSINOPHIL # BLD AUTO: 0 10E9/L (ref 0–0.7)
EOSINOPHIL NFR BLD AUTO: 0.9 %
ERYTHROCYTE [DISTWIDTH] IN BLOOD BY AUTOMATED COUNT: 12.9 % (ref 10–15)
ETHANOL SERPL-MCNC: 0.33 G/DL
GFR SERPL CREATININE-BSD FRML MDRD: >90 ML/MIN/{1.73_M2}
GLUCOSE SERPL-MCNC: 125 MG/DL (ref 70–99)
GLUCOSE UR STRIP-MCNC: NEGATIVE MG/DL
HCT VFR BLD AUTO: 33.2 % (ref 35–47)
HCT VFR BLD AUTO: 37.7 % (ref 35–47)
HGB BLD-MCNC: 11.6 G/DL (ref 11.7–15.7)
HGB BLD-MCNC: 13.3 G/DL (ref 11.7–15.7)
HGB UR QL STRIP: NEGATIVE
IMM GRANULOCYTES # BLD: 0 10E9/L (ref 0–0.4)
IMM GRANULOCYTES NFR BLD: 0 %
INR PPP: 1 (ref 0.86–1.14)
KETONES UR STRIP-MCNC: 5 MG/DL
LACTATE BLD-SCNC: 1.8 MMOL/L (ref 0.7–2)
LEUKOCYTE ESTERASE UR QL STRIP: NEGATIVE
LIPASE SERPL-CCNC: 338 U/L (ref 73–393)
LYMPHOCYTES # BLD AUTO: 1.3 10E9/L (ref 0.8–5.3)
LYMPHOCYTES NFR BLD AUTO: 29.3 %
MAGNESIUM SERPL-MCNC: 1.8 MG/DL (ref 1.6–2.3)
MCH RBC QN AUTO: 37.2 PG (ref 26.5–33)
MCHC RBC AUTO-ENTMCNC: 35.3 G/DL (ref 31.5–36.5)
MCV RBC AUTO: 105 FL (ref 78–100)
MONOCYTES # BLD AUTO: 0.5 10E9/L (ref 0–1.3)
MONOCYTES NFR BLD AUTO: 10.4 %
NEUTROPHILS # BLD AUTO: 2.5 10E9/L (ref 1.6–8.3)
NEUTROPHILS NFR BLD AUTO: 57.1 %
NITRATE UR QL: NEGATIVE
NRBC # BLD AUTO: 0 10*3/UL
NRBC BLD AUTO-RTO: 0 /100
PH UR STRIP: 5 PH (ref 4.7–8)
PLATELET # BLD AUTO: 312 10E9/L (ref 150–450)
POTASSIUM SERPL-SCNC: 3.6 MMOL/L (ref 3.4–5.3)
PROT SERPL-MCNC: 6.5 G/DL (ref 6.8–8.8)
RBC # BLD AUTO: 3.58 10E12/L (ref 3.8–5.2)
SODIUM SERPL-SCNC: 138 MMOL/L (ref 133–144)
SOURCE: ABNORMAL
SP GR UR STRIP: 1.05 (ref 1–1.03)
UROBILINOGEN UR STRIP-MCNC: NORMAL MG/DL (ref 0–2)
WBC # BLD AUTO: 4.4 10E9/L (ref 4–11)

## 2020-04-04 PROCEDURE — 99284 EMERGENCY DEPT VISIT MOD MDM: CPT | Mod: Z6 | Performed by: NURSE PRACTITIONER

## 2020-04-04 PROCEDURE — 83605 ASSAY OF LACTIC ACID: CPT | Performed by: NURSE PRACTITIONER

## 2020-04-04 PROCEDURE — 36415 COLL VENOUS BLD VENIPUNCTURE: CPT | Performed by: INTERNAL MEDICINE

## 2020-04-04 PROCEDURE — 96376 TX/PRO/DX INJ SAME DRUG ADON: CPT

## 2020-04-04 PROCEDURE — 86140 C-REACTIVE PROTEIN: CPT | Performed by: NURSE PRACTITIONER

## 2020-04-04 PROCEDURE — 25000132 ZZH RX MED GY IP 250 OP 250 PS 637: Performed by: INTERNAL MEDICINE

## 2020-04-04 PROCEDURE — 83735 ASSAY OF MAGNESIUM: CPT | Performed by: NURSE PRACTITIONER

## 2020-04-04 PROCEDURE — 74177 CT ABD & PELVIS W/CONTRAST: CPT | Mod: TC

## 2020-04-04 PROCEDURE — 85018 HEMOGLOBIN: CPT | Performed by: INTERNAL MEDICINE

## 2020-04-04 PROCEDURE — 36415 COLL VENOUS BLD VENIPUNCTURE: CPT | Performed by: NURSE PRACTITIONER

## 2020-04-04 PROCEDURE — 80320 DRUG SCREEN QUANTALCOHOLS: CPT | Performed by: NURSE PRACTITIONER

## 2020-04-04 PROCEDURE — 83690 ASSAY OF LIPASE: CPT | Performed by: NURSE PRACTITIONER

## 2020-04-04 PROCEDURE — 85610 PROTHROMBIN TIME: CPT | Performed by: NURSE PRACTITIONER

## 2020-04-04 PROCEDURE — 99285 EMERGENCY DEPT VISIT HI MDM: CPT | Mod: 25

## 2020-04-04 PROCEDURE — 96365 THER/PROPH/DIAG IV INF INIT: CPT

## 2020-04-04 PROCEDURE — 25500064 ZZH RX 255 OP 636: Performed by: NURSE PRACTITIONER

## 2020-04-04 PROCEDURE — 81003 URINALYSIS AUTO W/O SCOPE: CPT | Performed by: NURSE PRACTITIONER

## 2020-04-04 PROCEDURE — 25000132 ZZH RX MED GY IP 250 OP 250 PS 637: Performed by: NURSE PRACTITIONER

## 2020-04-04 PROCEDURE — 25000125 ZZHC RX 250: Performed by: NURSE PRACTITIONER

## 2020-04-04 PROCEDURE — 25000128 H RX IP 250 OP 636: Performed by: NURSE PRACTITIONER

## 2020-04-04 PROCEDURE — 25800030 ZZH RX IP 258 OP 636: Performed by: INTERNAL MEDICINE

## 2020-04-04 PROCEDURE — 85025 COMPLETE CBC W/AUTO DIFF WBC: CPT | Performed by: NURSE PRACTITIONER

## 2020-04-04 PROCEDURE — G0378 HOSPITAL OBSERVATION PER HR: HCPCS

## 2020-04-04 PROCEDURE — 80053 COMPREHEN METABOLIC PANEL: CPT | Performed by: NURSE PRACTITIONER

## 2020-04-04 PROCEDURE — 85014 HEMATOCRIT: CPT | Performed by: INTERNAL MEDICINE

## 2020-04-04 PROCEDURE — 25000128 H RX IP 250 OP 636: Performed by: INTERNAL MEDICINE

## 2020-04-04 PROCEDURE — 99219 ZZC INITIAL OBSERVATION CARE,LEVL II: CPT | Performed by: INTERNAL MEDICINE

## 2020-04-04 PROCEDURE — 96375 TX/PRO/DX INJ NEW DRUG ADDON: CPT

## 2020-04-04 PROCEDURE — C9113 INJ PANTOPRAZOLE SODIUM, VIA: HCPCS | Performed by: NURSE PRACTITIONER

## 2020-04-04 PROCEDURE — 25800030 ZZH RX IP 258 OP 636: Performed by: NURSE PRACTITIONER

## 2020-04-04 RX ORDER — LIDOCAINE 40 MG/G
CREAM TOPICAL
Status: DISCONTINUED | OUTPATIENT
Start: 2020-04-04 | End: 2020-04-05 | Stop reason: HOSPADM

## 2020-04-04 RX ORDER — NICOTINE 21 MG/24HR
1 PATCH, TRANSDERMAL 24 HOURS TRANSDERMAL DAILY
Status: DISCONTINUED | OUTPATIENT
Start: 2020-04-04 | End: 2020-04-05 | Stop reason: HOSPADM

## 2020-04-04 RX ORDER — ONDANSETRON 2 MG/ML
4 INJECTION INTRAMUSCULAR; INTRAVENOUS EVERY 30 MIN PRN
Status: DISCONTINUED | OUTPATIENT
Start: 2020-04-04 | End: 2020-04-04

## 2020-04-04 RX ORDER — POTASSIUM CHLORIDE 1500 MG/1
20-40 TABLET, EXTENDED RELEASE ORAL
Status: DISCONTINUED | OUTPATIENT
Start: 2020-04-04 | End: 2020-04-05 | Stop reason: HOSPADM

## 2020-04-04 RX ORDER — POTASSIUM CHLORIDE 7.45 MG/ML
10 INJECTION INTRAVENOUS
Status: DISCONTINUED | OUTPATIENT
Start: 2020-04-04 | End: 2020-04-05 | Stop reason: HOSPADM

## 2020-04-04 RX ORDER — MAGNESIUM SULFATE HEPTAHYDRATE 40 MG/ML
4 INJECTION, SOLUTION INTRAVENOUS EVERY 4 HOURS PRN
Status: DISCONTINUED | OUTPATIENT
Start: 2020-04-04 | End: 2020-04-05 | Stop reason: HOSPADM

## 2020-04-04 RX ORDER — ONDANSETRON 2 MG/ML
4 INJECTION INTRAMUSCULAR; INTRAVENOUS EVERY 6 HOURS PRN
Status: DISCONTINUED | OUTPATIENT
Start: 2020-04-04 | End: 2020-04-05 | Stop reason: HOSPADM

## 2020-04-04 RX ORDER — SODIUM CHLORIDE, SODIUM LACTATE, POTASSIUM CHLORIDE, CALCIUM CHLORIDE 600; 310; 30; 20 MG/100ML; MG/100ML; MG/100ML; MG/100ML
INJECTION, SOLUTION INTRAVENOUS CONTINUOUS
Status: ACTIVE | OUTPATIENT
Start: 2020-04-04 | End: 2020-04-05

## 2020-04-04 RX ORDER — PROCHLORPERAZINE MALEATE 10 MG
10 TABLET ORAL EVERY 6 HOURS PRN
Status: DISCONTINUED | OUTPATIENT
Start: 2020-04-04 | End: 2020-04-05 | Stop reason: HOSPADM

## 2020-04-04 RX ORDER — ACETAMINOPHEN 325 MG/1
650 TABLET ORAL EVERY 4 HOURS PRN
Status: DISCONTINUED | OUTPATIENT
Start: 2020-04-04 | End: 2020-04-05 | Stop reason: HOSPADM

## 2020-04-04 RX ORDER — POTASSIUM CHLORIDE 1.5 G/1.58G
20-40 POWDER, FOR SOLUTION ORAL
Status: DISCONTINUED | OUTPATIENT
Start: 2020-04-04 | End: 2020-04-05 | Stop reason: HOSPADM

## 2020-04-04 RX ORDER — PANTOPRAZOLE SODIUM 40 MG/1
40 TABLET, DELAYED RELEASE ORAL
Status: DISCONTINUED | OUTPATIENT
Start: 2020-04-04 | End: 2020-04-05 | Stop reason: HOSPADM

## 2020-04-04 RX ORDER — IOPAMIDOL 612 MG/ML
100 INJECTION, SOLUTION INTRAVASCULAR ONCE
Status: COMPLETED | OUTPATIENT
Start: 2020-04-04 | End: 2020-04-04

## 2020-04-04 RX ORDER — NALOXONE HYDROCHLORIDE 0.4 MG/ML
.1-.4 INJECTION, SOLUTION INTRAMUSCULAR; INTRAVENOUS; SUBCUTANEOUS
Status: DISCONTINUED | OUTPATIENT
Start: 2020-04-04 | End: 2020-04-05 | Stop reason: HOSPADM

## 2020-04-04 RX ORDER — ONDANSETRON 4 MG/1
4 TABLET, ORALLY DISINTEGRATING ORAL EVERY 6 HOURS PRN
Status: DISCONTINUED | OUTPATIENT
Start: 2020-04-04 | End: 2020-04-05 | Stop reason: HOSPADM

## 2020-04-04 RX ORDER — MAGNESIUM SULFATE HEPTAHYDRATE 40 MG/ML
2 INJECTION, SOLUTION INTRAVENOUS DAILY PRN
Status: DISCONTINUED | OUTPATIENT
Start: 2020-04-04 | End: 2020-04-05 | Stop reason: HOSPADM

## 2020-04-04 RX ORDER — SUCRALFATE ORAL 1 G/10ML
1 SUSPENSION ORAL
Status: DISCONTINUED | OUTPATIENT
Start: 2020-04-04 | End: 2020-04-05 | Stop reason: HOSPADM

## 2020-04-04 RX ORDER — HYDROMORPHONE HYDROCHLORIDE 1 MG/ML
0.5 INJECTION, SOLUTION INTRAMUSCULAR; INTRAVENOUS; SUBCUTANEOUS ONCE
Status: COMPLETED | OUTPATIENT
Start: 2020-04-04 | End: 2020-04-04

## 2020-04-04 RX ORDER — ALBUTEROL SULFATE 90 UG/1
2 AEROSOL, METERED RESPIRATORY (INHALATION) EVERY 4 HOURS PRN
Status: DISCONTINUED | OUTPATIENT
Start: 2020-04-04 | End: 2020-04-05 | Stop reason: HOSPADM

## 2020-04-04 RX ORDER — BUDESONIDE AND FORMOTEROL FUMARATE DIHYDRATE 160; 4.5 UG/1; UG/1
2 AEROSOL RESPIRATORY (INHALATION)
Status: DISCONTINUED | OUTPATIENT
Start: 2020-04-04 | End: 2020-04-05 | Stop reason: HOSPADM

## 2020-04-04 RX ORDER — PROCHLORPERAZINE 25 MG
25 SUPPOSITORY, RECTAL RECTAL EVERY 12 HOURS PRN
Status: DISCONTINUED | OUTPATIENT
Start: 2020-04-04 | End: 2020-04-05 | Stop reason: HOSPADM

## 2020-04-04 RX ORDER — POTASSIUM CL/LIDO/0.9 % NACL 10MEQ/0.1L
10 INTRAVENOUS SOLUTION, PIGGYBACK (ML) INTRAVENOUS
Status: DISCONTINUED | OUTPATIENT
Start: 2020-04-04 | End: 2020-04-05 | Stop reason: HOSPADM

## 2020-04-04 RX ADMIN — IOPAMIDOL 100 ML: 612 INJECTION, SOLUTION INTRAVENOUS at 14:47

## 2020-04-04 RX ADMIN — POTASSIUM CHLORIDE 20 MEQ: 1.5 POWDER, FOR SOLUTION ORAL at 18:39

## 2020-04-04 RX ADMIN — SUCRALFATE 1 G: 1 SUSPENSION ORAL at 18:40

## 2020-04-04 RX ADMIN — SODIUM CHLORIDE, POTASSIUM CHLORIDE, SODIUM LACTATE AND CALCIUM CHLORIDE: 600; 310; 30; 20 INJECTION, SOLUTION INTRAVENOUS at 18:43

## 2020-04-04 RX ADMIN — NICOTINE 1 PATCH: 21 PATCH, EXTENDED RELEASE TRANSDERMAL at 18:40

## 2020-04-04 RX ADMIN — ONDANSETRON 4 MG: 2 INJECTION INTRAMUSCULAR; INTRAVENOUS at 13:17

## 2020-04-04 RX ADMIN — SODIUM CHLORIDE 80 MG: 9 INJECTION, SOLUTION INTRAVENOUS at 13:29

## 2020-04-04 RX ADMIN — SODIUM CHLORIDE 1000 ML: 9 INJECTION, SOLUTION INTRAVENOUS at 13:17

## 2020-04-04 RX ADMIN — PANTOPRAZOLE SODIUM 40 MG: 40 TABLET, DELAYED RELEASE ORAL at 18:39

## 2020-04-04 RX ADMIN — MAGNESIUM SULFATE IN WATER 2 G: 40 INJECTION, SOLUTION INTRAVENOUS at 18:43

## 2020-04-04 RX ADMIN — HYDROMORPHONE HYDROCHLORIDE 0.5 MG: 1 INJECTION, SOLUTION INTRAMUSCULAR; INTRAVENOUS; SUBCUTANEOUS at 15:36

## 2020-04-04 RX ADMIN — LIDOCAINE HYDROCHLORIDE 30 ML: 20 SOLUTION ORAL; TOPICAL at 14:05

## 2020-04-04 RX ADMIN — ONDANSETRON 4 MG: 2 INJECTION INTRAMUSCULAR; INTRAVENOUS at 20:45

## 2020-04-04 ASSESSMENT — ENCOUNTER SYMPTOMS
FEVER: 0
HEADACHES: 0
BLOOD IN STOOL: 0
ABDOMINAL PAIN: 1
CONSTIPATION: 0
FREQUENCY: 1
DIARRHEA: 0
SORE THROAT: 0
RHINORRHEA: 0
DIZZINESS: 1
NAUSEA: 1
VOMITING: 1
LIGHT-HEADEDNESS: 0
WEAKNESS: 0
ANAL BLEEDING: 0
COUGH: 1
SHORTNESS OF BREATH: 0
DYSURIA: 1
CHILLS: 1

## 2020-04-04 ASSESSMENT — MIFFLIN-ST. JEOR: SCORE: 1152

## 2020-04-04 NOTE — H&P
[facility]    History and Physical  Hospitalist       Date of Admission:  4/4/2020    Assessment & Plan   Nidhi Miguel is a 54 year old female who presents with nausea, vomiting, and abdominal pain.  This is worsening of her pain which she started having January, was admitted in February for GI bleed, discharged home felt reasonably well for a few days and now pain came back few weeks ago and is progressively getting worse.  She came today because of the worsening of pain.    Principal Problem:  Coffee ground emesis    Assessment: Clinically stable.  Differential includes alcoholic gastritis, esophagitis.  Last EGD she had 2020 February 8 showed large reflux esophagitis, normal stomach.  Recommended Carafate and PPI.  Preadmission medications shows only Carafate but PPI not listed.    Plan: Admit for monitoring, pain control, rehydration and rule out significant clinical GI bleed   Continue Carafate and start PPI.  She was given 80 Protonix IV x1.  I will continue 40 twice daily p.o.   Asking for food.  We will give full liquids and advance if she tolerates.    Active Problems:    Tobacco use disorder    Assessment: Ongoing.    Plan: We will offer nicotine patch.      Alcohol abuse    Assessment: Couple admissions for inpatient detox.  Longer she stays sober was a year.  Admits to drinking 2 big drinks daily.  Last drink was this morning.    Plan: Monitor for electrolyte imbalance and clinical withdrawal symptoms.      Epigastric pain    Assessment: Most likely due to esophagitis.  Last EGD 2/8/2020 showed esophagitis but normal stomach no ulcers.  Today's CT showed thickening of the distal esophagus.  Denies any dysphagia.    Plan: Treatment with PPIs.   Avoid ibuprofen.      Dehydration    Assessment: Most likely due to decreased oral intake.    Plan: We will start lactated Ringer 100 cc/h for 18 hours.    History of elevated blood pressure.  Assessment: Patient's home medication listed  amlodipine  Plan: Hold amlodipine until we make sure that she is not actively bleeding.  Blood pressures acceptable.    DVT Prophylaxis: Pneumatic Compression Devices  Code Status: Full Code    Disposition: Expected discharge in 1-2 days once stable.    Madi Falk    Primary Care Physician   Pam Acuña    Chief Complaint Abdominal pain, nausea, vomiting, coffee-ground emesis x1.    Reason for Admission: Abdominal pain, rule out upper GI bleed.    Admission status: Observation.    History is obtained from the patient, electronic health record and emergency department physician    History of Present Illness   Nidhi Miguel is a 54 year old woman with past medical history significant for reflux esophagitis, hematemesis, hematochezia, alcohol abuse, ongoing tobacco use, degenerative joint disease status post cervical spine surgery, asthma/COPD, who came to emergency room because of abdominal pain, nausea, and vomiting.  Patient stated that her pain originally started January 2020 it was gradually progressing and February 8 she was here in the emergency room complaining of nausea vomiting and hematochezia.  She was transferred to [facility], stayed 4 days and discharged home on Protonix twice a day and Carafate.  EGD which was done 2/8/2020 showed LA grade reflux esophagitis, normal stomach, normal duodenum and no evidence of portal hypertension.  Recommendation was to continue above treatment and get CT abdomen colonoscopy at local hospital.  She has both scheduled but because of the bandemia all schedule procedures are canceled.  She admits pain epigastric which is not radiating associated with nausea and vomiting.  Any food causes the symptoms above denies hematemesis, hematochezia admits vomiting x1 this morning.  Continue abuse alcohol.  Admits to losing 10 pounds weight.  Denies family history of stomach cancer peptic ulcer disease.   10 points of review of system obtained.  No  pertinent positives is frequent urination.  The rest is negative.    ED events no acute events was hemodynamically stable    ED diagnostic workup: Chemistry, hematology, and CT abdomen.    ED imaging studies and blood test results: Hemoglobin stable, some macrocytosis.  CT abdomen showed thickening of the distal esophagus.    ED treatment: Given Protonix 80 mg IV, half a milligram of Dilaudid for pain, IV fluids, and Zofran.    Past Medical History    I have reviewed this patient's medical history and updated it with pertinent information if needed.   Past Medical History:   Diagnosis Date     Acquired absence of both cervix and uterus     No Comments Provided     Acute bronchospasm     possible asthma     Allergic rhinitis     2011     Cervicalgia     10/27/2011     Low back pain     Chronic low back pain (see narcotic contract).     Mild intermittent asthma, uncomplicated     No Comments Provided     Other psychoactive substance dependence, uncomplicated (H)     No Comments Provided     Personal history of other medical treatment (CODE)      1, para 1     Personal history of other medical treatment (CODE)     L4-5 with degenerative changes     Pneumonia due to other specified bacteria (H)     2012,Respiratory failure, vent support     Sepsis (H)     2012     Uncomplicated alcohol dependence (H)     2011       Past Surgical History   I have reviewed this patient's surgical history and updated it with pertinent information if needed.  Past Surgical History:   Procedure Laterality Date     FUSION CERVICAL ANTERIOR ONE LEVEL      2012,C3-7 posterior instrumented fusion iwth lateral mass screws and cancellous bone and progenix     LUMPECTOMY BREAST      1998,Left, benign     OTHER SURGICAL HISTORY      ,[id number],COLPOSCOPY,Abnormal paps     OTHER SURGICAL HISTORY      ,613250,OTHER,Right floating kidney     OTHER SURGICAL HISTORY      3/2010,[id number],CERVICAL DISKECTOMY,C5-6, subsequent  osteomyelitis and deformity     OTHER SURGICAL HISTORY      2/2012,[id number],CERVICAL DISKECTOMY,C5-6 redo and C4-7 arthrodesis and graft and anterior placement of plate.     UPPER GI ENDOSCOPY N/A 02/08/2020       Prior to Admission Medications   Prior to Admission Medications   Prescriptions Last Dose Informant Patient Reported? Taking?   BUDESONIDE-FORMOTEROL 160-4.5 MCG/ACT IN Inhaler More than a month at Unknown time  No No   Sig: INHALE 2 PUFFS BY MOUTH TWICE DAILY   albuterol (PROAIR HFA/PROVENTIL HFA/VENTOLIN HFA) 108 (90 Base) MCG/ACT inhaler More than a month at Unknown time Self No No   Sig: Inhale 2 puffs into the lungs every 4 hours as needed   albuterol (PROVENTIL) (2.5 MG/3ML) 0.083% neb solution  Self Yes No   Sig: Inhale 2.5 mg into the lungs 4 times daily as needed   amLODIPine (NORVASC) 5 MG tablet 4/4/2020 at Unknown time Self Yes Yes   ibuprofen (ADVIL/MOTRIN) 600 MG tablet More than a month at Unknown time Self Yes No   Sig: Take 1 tablet by mouth daily   ipratropium - albuterol 0.5 mg/2.5 mg/3 mL (DUONEB) 0.5-2.5 (3) MG/3ML neb solution More than a month at Unknown time Self No No   Sig: Inhale 1 vial (3 mLs) into the lungs 4 times daily as needed   magnesium oxide (MAG-OX) 400 (241.3 Mg) MG tablet 4/4/2020 at Unknown time  No Yes   Sig: Take 1 tablet (400 mg) by mouth 2 times daily   sucralfate (CARAFATE) 1 GM tablet 4/3/2020 at Unknown time  Yes Yes      Facility-Administered Medications: None     Allergies   Allergies   Allergen Reactions     Acetaminophen-Codeine Hives     Codeine Hives     Tylenol 3       Social History   I have reviewed this patient's social history and updated it with pertinent information if needed. Nidhi BELLAMY Wieliza  reports that she has been smoking cigarettes. She started smoking about 40 years ago. She has a 30.00 pack-year smoking history. She has never used smokeless tobacco. She reports current alcohol use of about 14.0 standard drinks of alcohol per week. She  reports that she does not use drugs.    Family History   I have reviewed this patient's family history and updated it with pertinent information if needed.   Family History   Problem Relation Age of Onset     Coronary Artery Disease Father      Hypertension Father         Hypertension     Prostate Cancer Father      Diabetes No family hx of      Colon Cancer No family hx of      Breast Cancer No family hx of      Other Cancer No family hx of      Substance Abuse Father         Alcohol/Drug,alcoholic but quit     Substance Abuse Paternal Grandfather         Alcohol/Drug,emphysema chemically induced..     Hypertension Paternal Grandfather         Hypertension     Cancer Maternal Grandmother         Cancer,Pancreatic cancer       Review of Systems   All 14 Systems were reviewed and found to be negative except what's mentioned in HPI    Physical Exam   Temp: 97.6  F (36.4  C) Temp src: Tympanic BP: 111/76 Pulse: 75 Heart Rate: 100 Resp: 16 SpO2: 99 % O2 Device: None (Room air)    Vital Signs with Ranges  Temp:  [97.6  F (36.4  C)] 97.6  F (36.4  C)  Pulse:  [] 75  Heart Rate:  [100] 100  Resp:  [16-18] 16  BP: ()/(67-95) 111/76  SpO2:  [97 %-100 %] 99 %  122 lbs 12.74 oz    Physical exam:   GENERAL: Awake, alert, in no distress. Looks comfortable.  HEENT: NC/AT. MMM. OP clear.   NECK: trachea midline, no JVD.   CARDIAC: regular, normal S1,S2, no S3. No murmurs, gallops or rubs. No bruits in the neck. No peripheral edema.    PULMONARY: normal vesicular breath sounds bilaterally.  GI: abdomen not distended, tender on deep and superficial palpation gastric area, bowel sounds present. No hepatosplenomegaly or pulsatile masses.  No rebound, no guarding.  : CVA not tender, bladder not palpable.  MUSCULOSKELETAL: major joints without effusion, full range of motion, no sarcopenia.  NEUROLOGICAL: normal muscle strength and sensory exam. DTR 2/4, no myoclonus. Gait not tested.   PSYCHIATRIC: normal affect, normal  mood. Awake, alert and oriented x 4.  INTEGUMENT: no rash, no ulcerations, warm, dry.   LYMPHATIC/HEMATOLOGIC: no LAD in the neck, both axillae, and groins. No petechiae, no ecchymoses.       Goals of care were discussed with the patient and family which included but not limited to anticipated treatment course during the current hospitalization, recovery from current event, discharge planning and transitions of care responsibilities and expected outcomes. Code status was addressed on admission as well. End of life care discussion not done.    Data   Data reviewed today:  I personally reviewed blood work.  Radiologist reviewed: CT abdomen  See below radiology report.   Recent Labs   Lab 04/04/20  1310   WBC 4.4   HGB 13.3   *      INR 1.00      POTASSIUM 3.6   CHLORIDE 107   CO2 26   BUN 11   CR 0.72   ANIONGAP 5   VASU 8.4*   *   ALBUMIN 3.0*   PROTTOTAL 6.5*   BILITOTAL 0.8   ALKPHOS 208*   ALT 65*   AST 89*   LIPASE 338       Recent Results (from the past 24 hour(s))   CT Abdomen Pelvis w Contrast    Narrative    PROCEDURE:  CT ABDOMEN PELVIS W CONTRAST    HISTORY: abdominal pain, ETOH abuse, hematemesis    TECHNIQUE:  Helical CT of the abdomen and pelvis was performed  following injection of intravenous contrast.  Ingested oral contrast  partially opacifies the bowel.      COMPARISON:  2/7/20.    MEDS/CONTRAST: Isovue 300 100ml Given    FINDINGS:      Limited images through the lung bases demonstrate. A small amount of  endobronchial debris, nonspecific, is present in the right lower lobe.  Mild thickening is seen in the distal esophagus, nonspecific.     The liver demonstrates no mass or intrahepatic biliary ductal  dilatation.  The gallbladder, spleen, pancreas and adrenal glands are  unremarkable.  Symmetric nephrograms are present without  hydronephrosis. The aorta is normal in size with scattered  atherosclerotic calcifications. The bowel is normal in caliber.    No free fluid, free  air or adenopathy is present.  No suspicious  osseous lesions are identified.      Impression    IMPRESSION:      Mild distal esophageal thickening.     PRANAV DURON MD

## 2020-04-04 NOTE — ED TRIAGE NOTES
"Patient arrives via ambulance for evaluation of nausea, vomiting, and abdominal pain. Onset of abdominal pain \"a couple months ago.\" Rating pain 8/10 to upper abdomen. Nausea and vomiting for the past month. Denies fevers.   "

## 2020-04-04 NOTE — ED PROVIDER NOTES
"  History     Chief Complaint   Patient presents with     Abdominal Pain     Nausea & Vomiting     HPI     Nidhi Miguel is a 54 year old female who presents on stretcher via EMS with concerns of abdominal pain. Pain has been ongoing for a few months but today it was worse than usual. Currently 6/10, constant. She has tried ibuprofen but it did not help. Pain increases with movement. She has associated nausea and vomiting. She has been able to eat off and on but in the last couple of days has had decreased appetite. Last bowel movement was yesterday soft, formed - denies diarrhea. Denies bright red blood per rectum but reports last time she came in she was transferred to [place] and had hematochezia. Denies melena. She admits too \"a wee-bit\" amount of coffee-ground emesis this morning. She has dysuria, urinary frequency.     Current 0.5 ppd smoker.   Daily alcohol use - vodka \"a few drinks per day.\" Last drink was this morning \"a couple of drinks\"    EGD in [place] when transferred there from ED (2/8/2020)- esophagitis found and started on omeprazole and Carafate. Reports she is compliant with omeprazole.         Allergies:  Allergies   Allergen Reactions     Acetaminophen-Codeine Hives     Codeine Hives     Tylenol 3       Problem List:    Patient Active Problem List    Diagnosis Date Noted     Alcoholic hepatitis without ascites 02/09/2020     Priority: Medium     Hypokalemia 02/09/2020     Priority: Medium     Hypomagnesemia 02/09/2020     Priority: Medium     Macrocytic anemia 02/09/2020     Priority: Medium     Thrombocytopenia (H) 02/09/2020     Priority: Medium     Acute colitis 02/07/2020     Priority: Medium     Coffee ground emesis 02/07/2020     Priority: Medium     Essential hypertension 06/20/2019     Priority: Medium     Major depressive disorder, recurrent, moderate (H) 06/20/2019     Priority: Medium     Asthma, intermittent 02/06/2018     Priority: Medium     Chemical dependency (H) 02/06/2018     " Priority: Medium     Overview:   10/21/12, per d/w Dr Bell, pt has abused etoh for years, hx of meth/marij/cocaine abuse, at [facility] one year       Left middle cerebral artery stroke (H) 07/29/2016     Priority: Medium     History of cerebrovascular accident (CVA) in adulthood 07/29/2016     Priority: Medium     Left CVA       Moderate alcohol dependence in early remission (H) 07/29/2016     Priority: Medium     Alcohol-induced pancreatitis 12/12/2014     Priority: Medium     Chronic neck pain 04/05/2013     Priority: Medium     Status post cervical spinal arthrodesis 03/29/2012     Priority: Medium     Overview:   IMO Update 10/11       Pseudoarthrosis of cervical spine (H) 02/02/2012     Priority: Medium     Alcohol dependence with intoxication (H) 09/13/2011     Priority: Medium     Overview:        Tobacco use disorder 03/12/2010     Priority: Medium     LTBI (latent tuberculosis infection) 03/03/2010     Priority: Medium     Overview:   Positive skin test 2002       Lumbago 11/15/2006     Priority: Medium     Overview:   IMO Update 10/11          Past Medical History:    Past Medical History:   Diagnosis Date     Acquired absence of both cervix and uterus      Acute bronchospasm      Allergic rhinitis      Cervicalgia      Low back pain      Mild intermittent asthma, uncomplicated      Other psychoactive substance dependence, uncomplicated (H)      Personal history of other medical treatment (CODE)      Personal history of other medical treatment (CODE)      Pneumonia due to other specified bacteria (H)      Sepsis (H)      Uncomplicated alcohol dependence (H)        Past Surgical History:    Past Surgical History:   Procedure Laterality Date     FUSION CERVICAL ANTERIOR ONE LEVEL      2/2012,C3-7 posterior instrumented fusion iwth lateral mass screws and cancellous bone and progenix     LUMPECTOMY BREAST      1998,Left, benign     OTHER SURGICAL HISTORY      1990s,SCU841,COLPOSCOPY,Abnormal  "paps     OTHER SURGICAL HISTORY      2000,600000,OTHER,Right floating kidney     OTHER SURGICAL HISTORY      3/2010,[id number],CERVICAL DISKECTOMY,C5-6, subsequent osteomyelitis and deformity     OTHER SURGICAL HISTORY      2/2012,[id number],CERVICAL DISKECTOMY,C5-6 redo and C4-7 arthrodesis and graft and anterior placement of plate.     UPPER GI ENDOSCOPY N/A 02/08/2020       Family History:    Family History   Problem Relation Age of Onset     Coronary Artery Disease Father      Hypertension Father         Hypertension     Prostate Cancer Father      Diabetes No family hx of      Colon Cancer No family hx of      Breast Cancer No family hx of      Other Cancer No family hx of      Substance Abuse Father         Alcohol/Drug,alcoholic but quit     Substance Abuse Paternal Grandfather         Alcohol/Drug,emphysema chemically induced..     Hypertension Paternal Grandfather         Hypertension     Cancer Maternal Grandmother         Cancer,Pancreatic cancer       Social History:  Marital Status:  Single [1]  Social History     Tobacco Use     Smoking status: Current Every Day Smoker     Packs/day: 1.00     Years: 30.00     Pack years: 30.00     Types: Cigarettes     Start date: 1/1/1980     Smokeless tobacco: Never Used     Tobacco comment: denies quit plan 3/16/2020   Substance Use Topics     Alcohol use: Yes     Alcohol/week: 14.0 standard drinks     Comment: Alcoholic Drinks/day: 1 liter ETOH/day-now none 2015. 2016: \"a couple drinks/day\"     Drug use: No     Types: Other     Comment: Drug use: NoHistory of methamphetamine use        Medications:    amLODIPine (NORVASC) 5 MG tablet  magnesium oxide (MAG-OX) 400 (241.3 Mg) MG tablet  sucralfate (CARAFATE) 1 GM tablet  albuterol (PROAIR HFA/PROVENTIL HFA/VENTOLIN HFA) 108 (90 Base) MCG/ACT inhaler  albuterol (PROVENTIL) (2.5 MG/3ML) 0.083% neb solution  BUDESONIDE-FORMOTEROL 160-4.5 MCG/ACT IN Inhaler  ibuprofen (ADVIL/MOTRIN) 600 MG tablet  ipratropium - albuterol " "0.5 mg/2.5 mg/3 mL (DUONEB) 0.5-2.5 (3) MG/3ML neb solution          Review of Systems   Constitutional: Positive for chills. Negative for fever.   HENT: Negative for congestion, ear pain, rhinorrhea and sore throat.    Respiratory: Positive for cough (\"off and on\"). Negative for shortness of breath.    Cardiovascular: Negative for chest pain.   Gastrointestinal: Positive for abdominal pain, nausea and vomiting. Negative for anal bleeding, blood in stool, constipation and diarrhea.   Genitourinary: Positive for dysuria and frequency.   Neurological: Positive for dizziness (\"When I stand up\"). Negative for weakness, light-headedness and headaches.       Physical Exam   BP: 112/95  Pulse: 91  Heart Rate: 100  Temp: 97.6  F (36.4  C)  Resp: 18  Weight: 55.8 kg (123 lb)  SpO2: 98 %  Lying Orthostatic BP: 110/84  Lying Orthostatic Pulse: 84 bpm  Sitting Orthostatic BP: 115/84  Sitting Orthostatic Pulse: 86 bpm  Standing Orthostatic BP: 119/80(Pt states felt dizzy with position changes.)  Standing Orthostatic Pulse: 95 bpm      Physical Exam  Constitutional:       General: She is in acute distress.   Cardiovascular:      Rate and Rhythm: Normal rate and regular rhythm.      Heart sounds: S1 normal and S2 normal. No murmur. No friction rub. No gallop.    Pulmonary:      Effort: Pulmonary effort is normal.      Breath sounds: Normal breath sounds.   Abdominal:      General: Bowel sounds are normal. There is no distension.      Palpations: Abdomen is soft.      Tenderness: There is abdominal tenderness in the epigastric area. There is guarding. There is no right CVA tenderness or left CVA tenderness.   Musculoskeletal:      Right lower leg: No edema.      Left lower leg: No edema.   Skin:     General: Skin is warm and dry.      Capillary Refill: Capillary refill takes less than 2 seconds.   Neurological:      Mental Status: She is alert and oriented to person, place, and time.   Psychiatric:         Mood and Affect: Mood " normal.         Speech: Speech normal.         Behavior: Behavior normal. Behavior is cooperative.         ED Course     Procedures    Results for orders placed or performed during the hospital encounter of 04/04/20 (from the past 24 hour(s))   Alcohol ethyl   Result Value Ref Range    Ethanol g/dL 0.33 (H) 0.01 g/dL   CBC with platelets differential   Result Value Ref Range    WBC 4.4 4.0 - 11.0 10e9/L    RBC Count 3.58 (L) 3.8 - 5.2 10e12/L    Hemoglobin 13.3 11.7 - 15.7 g/dL    Hematocrit 37.7 35.0 - 47.0 %     (H) 78 - 100 fl    MCH 37.2 (H) 26.5 - 33.0 pg    MCHC 35.3 31.5 - 36.5 g/dL    RDW 12.9 10.0 - 15.0 %    Platelet Count 312 150 - 450 10e9/L    Diff Method Automated Method     % Neutrophils 57.1 %    % Lymphocytes 29.3 %    % Monocytes 10.4 %    % Eosinophils 0.9 %    % Basophils 2.3 %    % Immature Granulocytes 0.0 %    Nucleated RBCs 0 0 /100    Absolute Neutrophil 2.5 1.6 - 8.3 10e9/L    Absolute Lymphocytes 1.3 0.8 - 5.3 10e9/L    Absolute Monocytes 0.5 0.0 - 1.3 10e9/L    Absolute Eosinophils 0.0 0.0 - 0.7 10e9/L    Absolute Basophils 0.1 0.0 - 0.2 10e9/L    Abs Immature Granulocytes 0.0 0 - 0.4 10e9/L    Absolute Nucleated RBC 0.0    Comprehensive metabolic panel   Result Value Ref Range    Sodium 138 133 - 144 mmol/L    Potassium 3.6 3.4 - 5.3 mmol/L    Chloride 107 94 - 109 mmol/L    Carbon Dioxide 26 20 - 32 mmol/L    Anion Gap 5 3 - 14 mmol/L    Glucose 125 (H) 70 - 99 mg/dL    Urea Nitrogen 11 7 - 30 mg/dL    Creatinine 0.72 0.52 - 1.04 mg/dL    GFR Estimate >90 >60 mL/min/[1.73_m2]    GFR Estimate If Black >90 >60 mL/min/[1.73_m2]    Calcium 8.4 (L) 8.5 - 10.1 mg/dL    Bilirubin Total 0.8 0.2 - 1.3 mg/dL    Albumin 3.0 (L) 3.4 - 5.0 g/dL    Protein Total 6.5 (L) 6.8 - 8.8 g/dL    Alkaline Phosphatase 208 (H) 40 - 150 U/L    ALT 65 (H) 0 - 50 U/L    AST 89 (H) 0 - 45 U/L   CRP inflammation   Result Value Ref Range    CRP Inflammation <2.9 0.0 - 8.0 mg/L   INR   Result Value Ref Range     INR 1.00 0.86 - 1.14   Lactic acid whole blood   Result Value Ref Range    Lactic Acid 1.8 0.7 - 2.0 mmol/L   Lipase   Result Value Ref Range    Lipase 338 73 - 393 U/L   Magnesium   Result Value Ref Range    Magnesium 1.8 1.6 - 2.3 mg/dL   CT Abdomen Pelvis w Contrast    Narrative    PROCEDURE:  CT ABDOMEN PELVIS W CONTRAST    HISTORY: abdominal pain, ETOH abuse, hematemesis    TECHNIQUE:  Helical CT of the abdomen and pelvis was performed  following injection of intravenous contrast.  Ingested oral contrast  partially opacifies the bowel.      COMPARISON:  2/7/20.    MEDS/CONTRAST: Isovue 300 100ml Given    FINDINGS:      Limited images through the lung bases demonstrate. A small amount of  endobronchial debris, nonspecific, is present in the right lower lobe.  Mild thickening is seen in the distal esophagus, nonspecific.     The liver demonstrates no mass or intrahepatic biliary ductal  dilatation.  The gallbladder, spleen, pancreas and adrenal glands are  unremarkable.  Symmetric nephrograms are present without  hydronephrosis. The aorta is normal in size with scattered  atherosclerotic calcifications. The bowel is normal in caliber.    No free fluid, free air or adenopathy is present.  No suspicious  osseous lesions are identified.      Impression    IMPRESSION:      Mild distal esophageal thickening.     PRANAV DURON MD   UA reflex to Microscopic and Culture    Specimen: Midstream Urine   Result Value Ref Range    Color Urine Light Yellow     Appearance Urine Clear     Glucose Urine Negative NEG^Negative mg/dL    Bilirubin Urine Negative NEG^Negative    Ketones Urine 5 (A) NEG^Negative mg/dL    Specific Gravity Urine 1.046 (H) 1.003 - 1.035    Blood Urine Negative NEG^Negative    pH Urine 5.0 4.7 - 8.0 pH    Protein Albumin Urine Negative NEG^Negative mg/dL    Urobilinogen mg/dL Normal 0.0 - 2.0 mg/dL    Nitrite Urine Negative NEG^Negative    Leukocyte Esterase Urine Negative NEG^Negative    Source  "Midstream Urine        Medications   ondansetron (ZOFRAN) injection 4 mg (4 mg Intravenous Given 4/4/20 1317)   0.9% sodium chloride BOLUS (0 mLs Intravenous Stopped 4/4/20 1534)   pantoprazole (PROTONIX) 80 mg in sodium chloride 0.9 % 100 mL intermittent infusion (0 mg Intravenous Stopped 4/4/20 1412)   lidocaine (XYLOCAINE) 2 % 15 mL, alum & mag hydroxide-simethicone (MAALOX  ES) 15 mL GI Cocktail (30 mLs Oral Given 4/4/20 1405)   iopamidol (ISOVUE-300) IV solution 61% 100 mL (100 mLs Intravenous Given 4/4/20 1447)   sodium chloride (PF) 0.9% PF flush 60 mL (60 mLs Intravenous Given 4/4/20 1446)   HYDROmorphone (PF) (DILAUDID) injection 0.5 mg (0.5 mg Intravenous Given 4/4/20 1536)       Assessments & Plan (with Medical Decision Making)     I have reviewed the nursing notes.    I have reviewed the findings, diagnosis, plan and need for follow up with the patient.  (R10.13) Epigastric pain  (K92.0) Hematemesis with nausea  (F10.10) Alcohol abuse  54-year old female presents on stretcher via EMS for worsening of ongoing epigastric pain for about 3 months. She has had nausea and vomiting since yesterday and this morning had a small amount of coffee ground emesis. Hemoglobin/hematocrit are normal at this time, orthostatic blood pressures are normal. She does report dizziness with postural changes. Chronic alcohol abuse - \"a few\" drinks with vodka per day; however, review of chart shows report of 1 liter of vodka daily. She was given protonix 80 mg IV, GI cocktail, zofran without relief of pain or nausea. She did have some relief of discomfort after hydromorphone 0.5 mg IV. Although patient is stable at this time her history of alcohol abuse, current intoxication and hematemesis concern for GI hemorrhage and her inability to understand - consulted with hospitalist for admission. She is in agreement with plan for admission to observation overnight.      New Prescriptions    No medications on file       Final diagnoses: "   Epigastric pain   Hematemesis with nausea   Alcohol abuse       4/4/2020   [facility]     Dian Santamaria CNP  04/05/20 [id number]

## 2020-04-05 VITALS
TEMPERATURE: 98.7 F | HEIGHT: 64 IN | DIASTOLIC BLOOD PRESSURE: 97 MMHG | SYSTOLIC BLOOD PRESSURE: 155 MMHG | WEIGHT: 122.8 LBS | BODY MASS INDEX: 20.96 KG/M2 | OXYGEN SATURATION: 99 % | RESPIRATION RATE: 16 BRPM | HEART RATE: 99 BPM

## 2020-04-05 LAB
ANION GAP SERPL CALCULATED.3IONS-SCNC: 7 MMOL/L (ref 3–14)
BUN SERPL-MCNC: 9 MG/DL (ref 7–30)
CALCIUM SERPL-MCNC: 8.1 MG/DL (ref 8.5–10.1)
CHLORIDE SERPL-SCNC: 106 MMOL/L (ref 94–109)
CO2 SERPL-SCNC: 23 MMOL/L (ref 20–32)
CREAT SERPL-MCNC: 0.74 MG/DL (ref 0.52–1.04)
GFR SERPL CREATININE-BSD FRML MDRD: >90 ML/MIN/{1.73_M2}
GLUCOSE SERPL-MCNC: 84 MG/DL (ref 70–99)
HCT VFR BLD AUTO: 32.7 % (ref 35–47)
HGB BLD-MCNC: 11.7 G/DL (ref 11.7–15.7)
MAGNESIUM SERPL-MCNC: 1.9 MG/DL (ref 1.6–2.3)
POTASSIUM SERPL-SCNC: 4.5 MMOL/L (ref 3.4–5.3)
SODIUM SERPL-SCNC: 136 MMOL/L (ref 133–144)

## 2020-04-05 PROCEDURE — 85018 HEMOGLOBIN: CPT | Performed by: INTERNAL MEDICINE

## 2020-04-05 PROCEDURE — G0378 HOSPITAL OBSERVATION PER HR: HCPCS

## 2020-04-05 PROCEDURE — 25800030 ZZH RX IP 258 OP 636: Performed by: INTERNAL MEDICINE

## 2020-04-05 PROCEDURE — 83735 ASSAY OF MAGNESIUM: CPT | Performed by: INTERNAL MEDICINE

## 2020-04-05 PROCEDURE — 99217 ZZC OBSERVATION CARE DISCHARGE: CPT | Performed by: INTERNAL MEDICINE

## 2020-04-05 PROCEDURE — 36415 COLL VENOUS BLD VENIPUNCTURE: CPT | Performed by: INTERNAL MEDICINE

## 2020-04-05 PROCEDURE — 80048 BASIC METABOLIC PNL TOTAL CA: CPT | Performed by: INTERNAL MEDICINE

## 2020-04-05 PROCEDURE — 25000128 H RX IP 250 OP 636: Performed by: INTERNAL MEDICINE

## 2020-04-05 PROCEDURE — 85014 HEMATOCRIT: CPT | Performed by: INTERNAL MEDICINE

## 2020-04-05 PROCEDURE — 25000132 ZZH RX MED GY IP 250 OP 250 PS 637: Performed by: INTERNAL MEDICINE

## 2020-04-05 RX ORDER — SUCRALFATE ORAL 1 G/10ML
1 SUSPENSION ORAL
Qty: 420 ML | Refills: 1 | Status: SHIPPED | OUTPATIENT
Start: 2020-04-05 | End: 2021-01-01

## 2020-04-05 RX ORDER — PANTOPRAZOLE SODIUM 40 MG/1
40 TABLET, DELAYED RELEASE ORAL
Qty: 60 TABLET | Refills: 1 | Status: SHIPPED | OUTPATIENT
Start: 2020-04-05 | End: 2020-06-24

## 2020-04-05 RX ADMIN — PANTOPRAZOLE SODIUM 40 MG: 40 TABLET, DELAYED RELEASE ORAL at 08:24

## 2020-04-05 RX ADMIN — SUCRALFATE 1 G: 1 SUSPENSION ORAL at 00:24

## 2020-04-05 RX ADMIN — SODIUM CHLORIDE, POTASSIUM CHLORIDE, SODIUM LACTATE AND CALCIUM CHLORIDE: 600; 310; 30; 20 INJECTION, SOLUTION INTRAVENOUS at 06:28

## 2020-04-05 RX ADMIN — PROCHLORPERAZINE MALEATE 10 MG: 10 TABLET, FILM COATED ORAL at 08:24

## 2020-04-05 RX ADMIN — ONDANSETRON 4 MG: 4 TABLET, ORALLY DISINTEGRATING ORAL at 06:28

## 2020-04-05 RX ADMIN — SUCRALFATE 1 G: 1 SUSPENSION ORAL at 08:24

## 2020-04-05 NOTE — PLAN OF CARE
Face to face report given with opportunity to observe patient.    Report given to  ERIC Garrett RN   4/4/2020  11:10 PM

## 2020-04-05 NOTE — PLAN OF CARE
Pt alert and orientated. Ate well for supper- clear liquids.  Voided x2.   Zofran given for nausea. Still feeling nauseated.  Elevate HOB to 45 degrees. Pt  was lying flat.  Rates abd. Discomfort 2/10  across upper area.  Abd. Soft with active bowel sounds. No tremors noticed..

## 2020-04-05 NOTE — PLAN OF CARE
Pt resting in bed. Alert and orientated. Tolerated clear liquid diet for supper.  IV infusing.  Slight nausea.  Abd. Tender, soft. Bowel sounds active.

## 2020-04-05 NOTE — DISCHARGE SUMMARY
Admit Date:     04/04/2020   Discharge Date:           PRIMARY CARE PHYSICIAN:  None.      ADMISSION DIAGNOSES:  1. Hematemesis    DISCHARGE Diagnosis:   1.  Hematemesis.   2.  Presumed alcoholic gastritis and esophagitis with associated hematemesis.   3.  Medication noncompliance.   3.  Alcoholism.   4.  Tranaminitis/alcoholic hepatitis.   5.  Macrocytosis.      PROCEDURES/CONSULTATIONS:  None.      PENDING TEST RESULTS AT TIME OF DISCHARGE:  None.      DISPOSITION:  To home self-care.      HISTORY OF PRESENT ILLNESS:  Please see admission H and P.      PAST MEDICAL HISTORY:  Please see admission H and P.      HOSPITAL COURSE:  Nidhi Miguel is a 54-year-old female with a history of alcoholism who was recently hospitalized approximately 2 months ago in [place] for hematemesis.  During that hospitalization, she did undergo EGD, which revealed esophagitis.  She was discharged home on Carafate q.i.d. along with PPI b.i.d.      The patient presented yesterday and admitted to noncompliance with her PPI.  She also has resumed drinking alcohol.  The patient was hospitalized overnight and hemoglobin remained stable.  There has been no significant recurrence of hematemesis, and the patient will be discharged at this time.      PHYSICAL EXAMINATION AT TIME OF DISCHARGE:   VITAL SIGNS:  Blood pressure 156/90, respirations 16, O2 sat 99% on room air, temperature 98.3, heart rate is 83.   GENERAL:  The patient is alert and oriented, pleasant, conversant, no acute distress.   HEART:  Regular rate and rhythm, S1, S2, no murmurs, rubs or gallops.   LUNGS:  Clear to auscultation.   ABDOMEN:  Benign, soft with positive bowel sounds.   EXTREMITIES:  Without edema.   NEUROLOGIC:  No focal neurologic deficits.  No signs of withdrawal such as tremors or fasciculations      ADDITIONAL DISCHARGE INSTRUCTIONS:   CODE STATUS:  FULL.   DIET:  Regular.   ACTIVITY:  As tolerated.      MEDICATIONS AT TIME OF DISCHARGE:  The patient will be  prescribed omeprazole 40 mg p.o. b.i.d. and Carafate 1 gram q.i.d.      FOLLOWUP APPOINTMENTS AND REFERRALS:  The patient should follow up with her establishment of care in the next 1-2 weeks.         BLAS TURCIOS DO             D: 2020   T: 2020   MT: SUSANA      Name:     SAUL MCDONALD   MRN:      [id number]        Account:        [id number]   :      1965           Admit Date:     2020                                  Discharge Date:       Document: [id number]

## 2020-04-05 NOTE — PLAN OF CARE
VSS, afebrile, HRR, lungs clear, bowels active. Pt states she had abdominal pain at start of shift, given nightly dose of carafate with relief. Pt up a few times during the night to void, no complaints. Did states this AM she was nauseas given zofran with relief. Pt is alert and oriented x 4, makes her needs known, and calls appropriately.     Face to face report given with opportunity to observe patient.    Report given to RAIZA Weber RN   4/5/2020  7:59 AM

## 2020-04-05 NOTE — PLAN OF CARE
[facility] Inpatient Admission Note:    Patient admitted to 3218/3218-1 at approximately 1658 via cart accompanied by transport tech from emergency room . Report received from ED- andrews rn  in SBAR format at 1634 via telephone. Patient ambulated to bed via self.. Patient is alert and oriented X 3, reports pain; rates at 2 on 0-10 scale.  Patient oriented to room, unit, hourly rounding, and plan of care. Explained admission packet and patient handbook with patient bill of rights brochure. Will continue to monitor and document as needed.     Inpatient Nursing criteria listed below was met:    Health care directives status obtained and documented: Yes    Care Everywhere authorization obtained No    MRSA swab completed for patient 65 years and older: No    Patient identifies a surrogate decision maker: No If yes, who:na Contact Information:see facesheet     If initial lactic acid >2.0, repeat lactic acid drawn within one hour of arrival to unit: NA. If no, state reason: na    Vaccination assessment and education completed: Yes   Vaccinations received prior to admission: Pneumovax yes  Influenza(seasonal)  N/A   Vaccination(s) ordered: influenza vaccine    Clergy visit ordered if patient requests: N/A    Skin issues/needs documented: N/A    Isolation Patient: no Education given, correct sign in place and documentation row added to PCS:  No    Fall Prevention N/A: Care plan updated, education given and documented, sticker and magnet in place: N/A    Care Plan initiated: Yes    Education Documented (including assessment): Yes    Patient has discharge needs : No If yes, please explain:na

## 2020-04-05 NOTE — PLAN OF CARE
Patient discharged at 8:58 AM via ambulation accompanied by staff. Prescriptions sent to patients preferred pharmacy. All belongings sent with patient.     Discharge instructions reviewed with pt. Listed belongings gathered and returned to patient. yes    Patient discharged to home.     Core Measures and Vaccines  Core Measures applicable during stay: no    Surgical Patient   Surgical Procedures during stay: no    MISC  Follow up appointment made:  Dr Quin Luis in May.   Home and hospital aquired medications returned to patient: n/a  Patient reports pain was well managed at discharge: Yes.

## 2020-05-08 ENCOUNTER — TELEPHONE (OUTPATIENT)
Dept: SURGERY | Facility: OTHER | Age: 55
End: 2020-05-08

## 2020-05-08 NOTE — TELEPHONE ENCOUNTER
Shauna Howell LPN contacted Nidhi on 05/08/20 and left a message. If patient calls back please schedule consult appointment for 5/14 after OR case in the morning. Needs to be taken off the schedule on 5/12 due to limited space with wound care utilizing the rooms .

## 2020-06-10 ENCOUNTER — NURSE TRIAGE (OUTPATIENT)
Dept: FAMILY MEDICINE | Facility: OTHER | Age: 55
End: 2020-06-10

## 2020-06-10 NOTE — TELEPHONE ENCOUNTER
"Patient reports she has had multiple UTI's in the past. Patient is unable to get a ride to be evaluated until Friday. Patient scheduled an appointment.     Additional Information    Negative: Shock suspected (e.g., cold/pale/clammy skin, too weak to stand, low BP, rapid pulse)    Negative: Sounds like a life-threatening emergency to the triager    Negative: Unable to urinate (or only a few drops) and bladder feels very full    Negative: Vomiting    Negative: Patient sounds very sick or weak to the triager    Negative: SEVERE pain with urination    Negative: Fever > 100.5 F (38.1 C)    Negative: Side (flank) or lower back pain present    Negative: Taking antibiotic > 24 hours for UTI and fever persists    Negative: Taking antibiotic > 3 days for UTI and painful urination not improved    Negative: Unusual vaginal discharge    Negative: > 2 UTIs in last year    Negative: Patient is worried about sexually transmitted disease (STD)    Age > 50 years    Answer Assessment - Initial Assessment Questions  1. SEVERITY: \"How bad is the pain?\"  (e.g., Scale 1-10; mild, moderate, or severe)    - MILD (1-3): complains slightly about urination hurting    - MODERATE (4-7): interferes with normal activities      - SEVERE (8-10): excruciating, unwilling or unable to urinate because of the pain       moderate  2. FREQUENCY: \"How many times have you had painful urination today?\"       6   3. PATTERN: \"Is pain present every time you urinate or just sometimes?\"       Every time   4. ONSET: \"When did the painful urination start?\"       A weeks ago  5. FEVER: \"Do you have a fever?\" If so, ask: \"What is your temperature, how was it measured, and when did it start?\"      no  6. PAST UTI: \"Have you had a urine infection before?\" If so, ask: \"When was the last time?\" and \"What happened that time?\"       Yes multiple  7. CAUSE: \"What do you think is causing the painful urination?\"  (e.g., UTI, scratch, Herpes sore)      UTI  8. OTHER SYMPTOMS: " "\"Do you have any other symptoms?\" (e.g., flank pain, vaginal discharge, genital sores, urgency, blood in urine)      Flank pain, urgency   9. PREGNANCY: \"Is there any chance you are pregnant?\" \"When was your last menstrual period?\"      n/a    Protocols used: URINATION PAIN - FEMALE-A-OH      "

## 2020-06-24 ENCOUNTER — HOSPITAL ENCOUNTER (EMERGENCY)
Facility: HOSPITAL | Age: 55
Discharge: HOME | End: 2020-06-25
Attending: EMERGENCY MEDICINE | Admitting: EMERGENCY MEDICINE
Payer: COMMERCIAL

## 2020-06-24 ENCOUNTER — APPOINTMENT (OUTPATIENT)
Dept: GENERAL RADIOLOGY | Facility: HOSPITAL | Age: 55
End: 2020-06-24
Attending: EMERGENCY MEDICINE
Payer: COMMERCIAL

## 2020-06-24 DIAGNOSIS — N30.00 ACUTE CYSTITIS WITHOUT HEMATURIA: ICD-10-CM

## 2020-06-24 DIAGNOSIS — F10.10 ALCOHOL ABUSE: ICD-10-CM

## 2020-06-24 DIAGNOSIS — F10.920 ALCOHOLIC INTOXICATION WITHOUT COMPLICATION (H): ICD-10-CM

## 2020-06-24 LAB
ALBUMIN SERPL-MCNC: 3.8 G/DL (ref 3.4–5)
ALBUMIN UR-MCNC: 30 MG/DL
ALP SERPL-CCNC: 259 U/L (ref 40–150)
ALT SERPL W P-5'-P-CCNC: 107 U/L (ref 0–50)
AMPHETAMINES UR QL: NOT DETECTED NG/ML
ANION GAP SERPL CALCULATED.3IONS-SCNC: 8 MMOL/L (ref 3–14)
APAP SERPL-MCNC: <2 MG/L (ref 10–20)
APPEARANCE UR: ABNORMAL
AST SERPL W P-5'-P-CCNC: 421 U/L (ref 0–45)
BACTERIA #/AREA URNS HPF: ABNORMAL /HPF
BARBITURATES UR QL SCN: NOT DETECTED NG/ML
BASOPHILS # BLD AUTO: 0.1 10E9/L (ref 0–0.2)
BASOPHILS NFR BLD AUTO: 1.9 %
BENZODIAZ UR QL SCN: NOT DETECTED NG/ML
BILIRUB SERPL-MCNC: 0.5 MG/DL (ref 0.2–1.3)
BILIRUB UR QL STRIP: NEGATIVE
BUN SERPL-MCNC: 13 MG/DL (ref 7–30)
BUPRENORPHINE UR QL: NOT DETECTED NG/ML
CALCIUM SERPL-MCNC: 8.8 MG/DL (ref 8.5–10.1)
CANNABINOIDS UR QL: NOT DETECTED NG/ML
CHLORIDE SERPL-SCNC: 110 MMOL/L (ref 94–109)
CO2 SERPL-SCNC: 24 MMOL/L (ref 20–32)
COCAINE UR QL SCN: NOT DETECTED NG/ML
COLOR UR AUTO: YELLOW
CREAT SERPL-MCNC: 0.64 MG/DL (ref 0.52–1.04)
D-METHAMPHET UR QL: NOT DETECTED NG/ML
DIFFERENTIAL METHOD BLD: ABNORMAL
EOSINOPHIL # BLD AUTO: 0.1 10E9/L (ref 0–0.7)
EOSINOPHIL NFR BLD AUTO: 2.3 %
ERYTHROCYTE [DISTWIDTH] IN BLOOD BY AUTOMATED COUNT: 13.6 % (ref 10–15)
ETHANOL SERPL-MCNC: 0.43 G/DL
GFR SERPL CREATININE-BSD FRML MDRD: >90 ML/MIN/{1.73_M2}
GLUCOSE SERPL-MCNC: 87 MG/DL (ref 70–99)
GLUCOSE UR STRIP-MCNC: NEGATIVE MG/DL
HCT VFR BLD AUTO: 33.9 % (ref 35–47)
HGB BLD-MCNC: 12 G/DL (ref 11.7–15.7)
HGB UR QL STRIP: NEGATIVE
HYALINE CASTS #/AREA URNS LPF: 6 /LPF
IMM GRANULOCYTES # BLD: 0 10E9/L (ref 0–0.4)
IMM GRANULOCYTES NFR BLD: 0.3 %
KETONES UR STRIP-MCNC: 5 MG/DL
LEUKOCYTE ESTERASE UR QL STRIP: ABNORMAL
LYMPHOCYTES # BLD AUTO: 1.1 10E9/L (ref 0.8–5.3)
LYMPHOCYTES NFR BLD AUTO: 36.5 %
MAGNESIUM SERPL-MCNC: 1.6 MG/DL (ref 1.6–2.3)
MCH RBC QN AUTO: 39 PG (ref 26.5–33)
MCHC RBC AUTO-ENTMCNC: 35.4 G/DL (ref 31.5–36.5)
MCV RBC AUTO: 110 FL (ref 78–100)
METHADONE UR QL SCN: NOT DETECTED NG/ML
MONOCYTES # BLD AUTO: 0.4 10E9/L (ref 0–1.3)
MONOCYTES NFR BLD AUTO: 12.6 %
MUCOUS THREADS #/AREA URNS LPF: PRESENT /LPF
NEUTROPHILS # BLD AUTO: 1.4 10E9/L (ref 1.6–8.3)
NEUTROPHILS NFR BLD AUTO: 46.4 %
NITRATE UR QL: NEGATIVE
NRBC # BLD AUTO: 0 10*3/UL
NRBC BLD AUTO-RTO: 0 /100
OPIATES UR QL SCN: NOT DETECTED NG/ML
OXYCODONE UR QL SCN: NOT DETECTED NG/ML
PCP UR QL SCN: NOT DETECTED NG/ML
PH UR STRIP: 6 PH (ref 4.7–8)
PLATELET # BLD AUTO: 91 10E9/L (ref 150–450)
POTASSIUM SERPL-SCNC: 3 MMOL/L (ref 3.4–5.3)
PROPOXYPH UR QL: NOT DETECTED NG/ML
PROT SERPL-MCNC: 7.3 G/DL (ref 6.8–8.8)
RBC # BLD AUTO: 3.08 10E12/L (ref 3.8–5.2)
RBC #/AREA URNS AUTO: 0 /HPF (ref 0–2)
SALICYLATES SERPL-MCNC: 4 MG/DL
SODIUM SERPL-SCNC: 142 MMOL/L (ref 133–144)
SOURCE: ABNORMAL
SP GR UR STRIP: 1.02 (ref 1–1.03)
SQUAMOUS #/AREA URNS AUTO: 9 /HPF (ref 0–1)
TRICYCLICS UR QL SCN: NOT DETECTED NG/ML
UROBILINOGEN UR STRIP-MCNC: NORMAL MG/DL (ref 0–2)
WBC # BLD AUTO: 3.1 10E9/L (ref 4–11)
WBC #/AREA URNS AUTO: 126 /HPF (ref 0–5)

## 2020-06-24 PROCEDURE — 80329 ANALGESICS NON-OPIOID 1 OR 2: CPT | Performed by: EMERGENCY MEDICINE

## 2020-06-24 PROCEDURE — 99284 EMERGENCY DEPT VISIT MOD MDM: CPT

## 2020-06-24 PROCEDURE — 80306 DRUG TEST PRSMV INSTRMNT: CPT | Performed by: EMERGENCY MEDICINE

## 2020-06-24 PROCEDURE — 99283 EMERGENCY DEPT VISIT LOW MDM: CPT | Mod: Z6 | Performed by: EMERGENCY MEDICINE

## 2020-06-24 PROCEDURE — 87088 URINE BACTERIA CULTURE: CPT | Performed by: EMERGENCY MEDICINE

## 2020-06-24 PROCEDURE — 36415 COLL VENOUS BLD VENIPUNCTURE: CPT | Performed by: EMERGENCY MEDICINE

## 2020-06-24 PROCEDURE — 85025 COMPLETE CBC W/AUTO DIFF WBC: CPT | Performed by: EMERGENCY MEDICINE

## 2020-06-24 PROCEDURE — 71101 X-RAY EXAM UNILAT RIBS/CHEST: CPT | Mod: TC,RT

## 2020-06-24 PROCEDURE — 25000132 ZZH RX MED GY IP 250 OP 250 PS 637: Performed by: EMERGENCY MEDICINE

## 2020-06-24 PROCEDURE — 80053 COMPREHEN METABOLIC PANEL: CPT | Performed by: EMERGENCY MEDICINE

## 2020-06-24 PROCEDURE — 83735 ASSAY OF MAGNESIUM: CPT | Performed by: EMERGENCY MEDICINE

## 2020-06-24 PROCEDURE — 80320 DRUG SCREEN QUANTALCOHOLS: CPT | Performed by: EMERGENCY MEDICINE

## 2020-06-24 PROCEDURE — 87186 SC STD MICRODIL/AGAR DIL: CPT | Performed by: EMERGENCY MEDICINE

## 2020-06-24 PROCEDURE — 81001 URINALYSIS AUTO W/SCOPE: CPT | Mod: 59 | Performed by: EMERGENCY MEDICINE

## 2020-06-24 PROCEDURE — 87086 URINE CULTURE/COLONY COUNT: CPT | Performed by: EMERGENCY MEDICINE

## 2020-06-24 RX ORDER — POTASSIUM CHLORIDE 1500 MG/1
60 TABLET, EXTENDED RELEASE ORAL ONCE
Status: COMPLETED | OUTPATIENT
Start: 2020-06-24 | End: 2020-06-24

## 2020-06-24 RX ORDER — SULFAMETHOXAZOLE/TRIMETHOPRIM 800-160 MG
1 TABLET ORAL ONCE
Status: COMPLETED | OUTPATIENT
Start: 2020-06-24 | End: 2020-06-24

## 2020-06-24 RX ADMIN — SULFAMETHOXAZOLE AND TRIMETHOPRIM 1 TABLET: 800; 160 TABLET ORAL at 21:42

## 2020-06-24 RX ADMIN — POTASSIUM CHLORIDE 60 MEQ: 1500 TABLET, EXTENDED RELEASE ORAL at 22:37

## 2020-06-24 ASSESSMENT — ENCOUNTER SYMPTOMS
RESPIRATORY NEGATIVE: 1
PHOTOPHOBIA: 0
ENDOCRINE NEGATIVE: 1
CARDIOVASCULAR NEGATIVE: 1
MYALGIAS: 0
SLEEP DISTURBANCE: 0
HEMATOLOGIC/LYMPHATIC NEGATIVE: 1
PSYCHIATRIC NEGATIVE: 1
MUSCULOSKELETAL NEGATIVE: 1
EYES NEGATIVE: 1
GASTROINTESTINAL NEGATIVE: 1
ALLERGIC/IMMUNOLOGIC NEGATIVE: 1
FEVER: 0
NECK STIFFNESS: 0
CONSTITUTIONAL NEGATIVE: 1
NEUROLOGICAL NEGATIVE: 1
SHORTNESS OF BREATH: 0
NERVOUS/ANXIOUS: 0
NECK PAIN: 0

## 2020-06-24 NOTE — ED AVS SNAPSHOT
[facility]  [address]  [place] [address]  Phone:  [phone]                                    Nidhi Miguel   MRN: [id number]    Department:  [facility]   Date of Visit:  6/24/2020           After Visit Summary Signature Page    I have received my discharge instructions, and my questions have been answered. I have discussed any challenges I see with this plan with the nurse or doctor.    ..........................................................................................................................................  Patient/Patient Representative Signature      ..........................................................................................................................................  Patient Representative Print Name and Relationship to Patient    ..................................................               ................................................  Date                                   Time    ..........................................................................................................................................  Reviewed by Signature/Title    ...................................................              ..............................................  Date                                               Time          22EPIC Rev 08/18

## 2020-06-25 VITALS
TEMPERATURE: 98.9 F | OXYGEN SATURATION: 98 % | SYSTOLIC BLOOD PRESSURE: 125 MMHG | HEART RATE: 88 BPM | DIASTOLIC BLOOD PRESSURE: 82 MMHG | RESPIRATION RATE: 16 BRPM

## 2020-06-25 LAB — ETHANOL SERPL-MCNC: 0.27 G/DL

## 2020-06-25 PROCEDURE — 80320 DRUG SCREEN QUANTALCOHOLS: CPT | Performed by: EMERGENCY MEDICINE

## 2020-06-25 PROCEDURE — 36415 COLL VENOUS BLD VENIPUNCTURE: CPT | Performed by: EMERGENCY MEDICINE

## 2020-06-25 RX ORDER — SULFAMETHOXAZOLE/TRIMETHOPRIM 800-160 MG
1 TABLET ORAL 2 TIMES DAILY
Qty: 20 TABLET | Refills: 0 | Status: SHIPPED | OUTPATIENT
Start: 2020-06-25 | End: 2020-07-05

## 2020-06-25 NOTE — DISCHARGE INSTRUCTIONS
1) Follow the aftercare instructions provided  2) You have been given a prescription for a medication that can cause an allergic reactions. Return to the ER if you develop any itching, tongue swelling, wheezing or shortness of breath.  3) You must stop drinking  4) Follow up with your doctor in 12 to 24 hours.

## 2020-06-25 NOTE — ED NOTES
Pt ambulated into hallway stating she forgt to tell the doctor she had a UTI and thinks she may have it again. Ensure pt we will relay information again. Pt pleasant and cooperative. Walked back to room.

## 2020-06-25 NOTE — ED TRIAGE NOTES
"Pt to ED with reports of back pains for \"a long time\" came in today \"the  took me in\"  Pt reports she is unaware of why she is here.  \" I think my boyfriend made me come because he's sick of me hurting\"  Pt admits to drinking ETOH today PTA  "

## 2020-06-25 NOTE — ED NOTES
"When talking with patient she asks this nurse \" are you ok\"  I asked the patient if she was ok and she responded \" No I dont know\" when asking patient what wrong \" I don't know\"  Call light in hand.  Instructed to call if she needs anything    "

## 2020-06-25 NOTE — ED PROVIDER NOTES
History     Chief Complaint   Patient presents with     Back Pain     Alcohol Intoxication     Stress     HPI  Nidhi Miguel is a 54 year old female who sent today with complaints of back pain and alcohol intoxication.  Patient denies any trauma.  States that she has been drinking alcohol.  Recently was to be treated for urine tract infection but was never was.  Patient denies any additional complaint.    Allergies:  Allergies   Allergen Reactions     Acetaminophen-Codeine Hives     Codeine Hives     Tylenol 3       Problem List:    Patient Active Problem List    Diagnosis Date Noted     Alcohol abuse 04/04/2020     Priority: Medium     Epigastric pain 04/04/2020     Priority: Medium     Dehydration 04/04/2020     Priority: Medium     Hematemesis 04/04/2020     Priority: Medium     Alcoholic hepatitis without ascites 02/09/2020     Priority: Medium     Hypokalemia 02/09/2020     Priority: Medium     Hypomagnesemia 02/09/2020     Priority: Medium     Macrocytic anemia 02/09/2020     Priority: Medium     Thrombocytopenia (H) 02/09/2020     Priority: Medium     Acute colitis 02/07/2020     Priority: Medium     Hematemesis with nausea 02/07/2020     Priority: Medium     Essential hypertension 06/20/2019     Priority: Medium     Major depressive disorder, recurrent, moderate (H) 06/20/2019     Priority: Medium     Asthma, intermittent 02/06/2018     Priority: Medium     Chemical dependency (H) 02/06/2018     Priority: Medium     Overview:   10/21/12, per d/w Dr Bell, pt has abused etoh for years, hx of meth/marij/cocaine abuse, at [facility] year       Left middle cerebral artery stroke (H) 07/29/2016     Priority: Medium     History of cerebrovascular accident (CVA) in adulthood 07/29/2016     Priority: Medium     Left CVA       Moderate alcohol dependence in early remission (H) 07/29/2016     Priority: Medium     Alcohol-induced pancreatitis 12/12/2014     Priority: Medium     Chronic neck pain  04/05/2013     Priority: Medium     Status post cervical spinal arthrodesis 03/29/2012     Priority: Medium     Overview:   IMO Update 10/11       Pseudoarthrosis of cervical spine (H) 02/02/2012     Priority: Medium     Alcohol dependence with intoxication (H) 09/13/2011     Priority: Medium     Overview:        Tobacco use disorder 03/12/2010     Priority: Medium     LTBI (latent tuberculosis infection) 03/03/2010     Priority: Medium     Overview:   Positive skin test 2002       Lumbago 11/15/2006     Priority: Medium     Overview:   IMO Update 10/11          Past Medical History:    Past Medical History:   Diagnosis Date     Acquired absence of both cervix and uterus      Acute bronchospasm      Allergic rhinitis      Cervicalgia      Low back pain      Mild intermittent asthma, uncomplicated      Other psychoactive substance dependence, uncomplicated (H)      Personal history of other medical treatment (CODE)      Personal history of other medical treatment (CODE)      Pneumonia due to other specified bacteria (H)      Sepsis (H)      Uncomplicated alcohol dependence (H)        Past Surgical History:    Past Surgical History:   Procedure Laterality Date     FUSION CERVICAL ANTERIOR ONE LEVEL      2/2012,C3-7 posterior instrumented fusion iwth lateral mass screws and cancellous bone and progenix     LUMPECTOMY BREAST      1998,Left, benign     OTHER SURGICAL HISTORY      1990s,[id number],COLPOSCOPY,Abnormal paps     OTHER SURGICAL HISTORY      2000,220698,OTHER,Right floating kidney     OTHER SURGICAL HISTORY      3/2010,[id number],CERVICAL DISKECTOMY,C5-6, subsequent osteomyelitis and deformity     OTHER SURGICAL HISTORY      2/2012,[id number],CERVICAL DISKECTOMY,C5-6 redo and C4-7 arthrodesis and graft and anterior placement of plate.     UPPER GI ENDOSCOPY N/A 02/08/2020       Family History:    Family History   Problem Relation Age of Onset     Coronary Artery Disease Father      Hypertension Father          Hypertension     Prostate Cancer Father      Diabetes No family hx of      Colon Cancer No family hx of      Breast Cancer No family hx of      Other Cancer No family hx of      Substance Abuse Father         Alcohol/Drug,alcoholic but quit     Substance Abuse Paternal Grandfather         Alcohol/Drug,emphysema chemically induced..     Hypertension Paternal Grandfather         Hypertension     Cancer Maternal Grandmother         Cancer,Pancreatic cancer       Social History:  Marital Status:  Single [1]  Social History     Tobacco Use     Smoking status: Current Every Day Smoker     Packs/day: 1.00     Years: 30.00     Pack years: 30.00     Types: Cigarettes     Start date: 1/1/1980     Smokeless tobacco: Never Used     Tobacco comment: denies quit plan 3/16/2020   Substance Use Topics     Alcohol use: Yes     Alcohol/week: 14.0 standard drinks     Comment: today, vodka straight     Drug use: No     Types: Other     Comment: Drug use: NoHistory of methamphetamine use        Medications:    albuterol (PROAIR HFA/PROVENTIL HFA/VENTOLIN HFA) 108 (90 Base) MCG/ACT inhaler  sucralfate (CARAFATE) 1 GM/10ML suspension  albuterol (PROVENTIL) (2.5 MG/3ML) 0.083% neb solution  BUDESONIDE-FORMOTEROL 160-4.5 MCG/ACT IN Inhaler  ipratropium - albuterol 0.5 mg/2.5 mg/3 mL (DUONEB) 0.5-2.5 (3) MG/3ML neb solution          Review of Systems   Constitutional: Negative.  Negative for fever.   HENT: Negative.    Eyes: Negative.  Negative for photophobia.   Respiratory: Negative.  Negative for shortness of breath.    Cardiovascular: Negative.    Gastrointestinal: Negative.    Endocrine: Negative.    Genitourinary: Negative.    Musculoskeletal: Negative.  Negative for myalgias, neck pain and neck stiffness.   Skin: Negative.    Allergic/Immunologic: Negative.    Neurological: Negative.    Hematological: Negative.    Psychiatric/Behavioral: Negative.  Negative for self-injury, sleep disturbance and suicidal ideas. The patient is not  nervous/anxious.        Physical Exam   BP: (!) 171/118  Pulse: 95  Heart Rate: 112  Temp: 99  F (37.2  C)  Resp: 16  SpO2: 96 %      Physical Exam  Constitutional:       General: She is not in acute distress.     Appearance: Normal appearance. She is normal weight. She is not toxic-appearing.   HENT:      Head: Normocephalic and atraumatic.   Eyes:      Conjunctiva/sclera: Conjunctivae normal.   Neck:      Musculoskeletal: Normal range of motion and neck supple. No neck rigidity.   Cardiovascular:      Rate and Rhythm: Normal rate and regular rhythm.   Pulmonary:      Effort: Pulmonary effort is normal.   Abdominal:      General: Abdomen is flat. There is no distension.      Tenderness: There is no abdominal tenderness. There is right CVA tenderness (Number right CVA tenderness.  Tenderness with mild palpation.). There is no left CVA tenderness or guarding.   Musculoskeletal: Normal range of motion.   Lymphadenopathy:      Cervical: No cervical adenopathy.   Skin:     General: Skin is warm.      Capillary Refill: Capillary refill takes less than 2 seconds.   Neurological:      General: No focal deficit present.      Mental Status: She is alert.      Cranial Nerves: No cranial nerve deficit.      Motor: No weakness.      Gait: Gait normal.      Deep Tendon Reflexes: Reflexes normal.   Psychiatric:         Mood and Affect: Mood normal.         Behavior: Behavior normal.         Thought Content: Thought content normal.         Judgment: Judgment normal.         ED Course     ED Course as of Jun 25 2041   Thu Jun 25, 2020   0134 Patient asleep. No complaints.  Has been awake alert and ambulating without any difficulty.        0308 Repeat EtOH is 0.27.  Patient asleep but easily arousable and has been seen ambulating.  Plan: discharge home. Patient will be taken home by the police.          Procedures           Results for orders placed or performed during the hospital encounter of 06/24/20 (from the past 24 hour(s))    UA reflex to Microscopic and Culture    Specimen: Midstream Urine   Result Value Ref Range    Color Urine Yellow     Appearance Urine Slightly Cloudy     Glucose Urine Negative NEG^Negative mg/dL    Bilirubin Urine Negative NEG^Negative    Ketones Urine 5 (A) NEG^Negative mg/dL    Specific Gravity Urine 1.016 1.003 - 1.035    Blood Urine Negative NEG^Negative    pH Urine 6.0 4.7 - 8.0 pH    Protein Albumin Urine 30 (A) NEG^Negative mg/dL    Urobilinogen mg/dL Normal 0.0 - 2.0 mg/dL    Nitrite Urine Negative NEG^Negative    Leukocyte Esterase Urine Large (A) NEG^Negative    Source Midstream Urine     RBC Urine 0 0 - 2 /HPF    WBC Urine 126 (H) 0 - 5 /HPF    Bacteria Urine Many (A) NEG^Negative /HPF    Squamous Epithelial /HPF Urine 9 (H) 0 - 1 /HPF    Mucous Urine Present (A) NEG^Negative /LPF    Hyaline Casts 6 (A) OTO2^O - 2 /LPF   Urine Drugs of Abuse Screen Panel 13   Result Value Ref Range    Cannabinoids (49-qqj-1-carboxy-9-THC) Not Detected NDET^Not Detected ng/mL    Phencyclidine (Phencyclidine) Not Detected NDET^Not Detected ng/mL    Cocaine (Benzoylecgonine) Not Detected NDET^Not Detected ng/mL    Methamphetamine (d-Methamphetamine) Not Detected NDET^Not Detected ng/mL    Opiates (Morphine) Not Detected NDET^Not Detected ng/mL    Amphetamine (d-Amphetamine) Not Detected NDET^Not Detected ng/mL    Benzodiazepines (Nordiazepam) Not Detected NDET^Not Detected ng/mL    Tricyclic Antidepressants (Desipramine) Not Detected NDET^Not Detected ng/mL    Methadone (Methadone) Not Detected NDET^Not Detected ng/mL    Barbiturates (Butalbital) Not Detected NDET^Not Detected ng/mL    Oxycodone (Oxycodone) Not Detected NDET^Not Detected ng/mL    Propoxyphene (Norpropoxyphene) Not Detected NDET^Not Detected ng/mL    Buprenorphine (Buprenorphine) Not Detected NDET^Not Detected ng/mL   Acetaminophen level   Result Value Ref Range    Acetaminophen Level <2 (L) 10 - 20 mg/L   Alcohol ethyl   Result Value Ref Range    Ethanol  g/dL 0.43 (H) 0.01 g/dL   CBC with platelets differential   Result Value Ref Range    WBC 3.1 (L) 4.0 - 11.0 10e9/L    RBC Count 3.08 (L) 3.8 - 5.2 10e12/L    Hemoglobin 12.0 11.7 - 15.7 g/dL    Hematocrit 33.9 (L) 35.0 - 47.0 %     (H) 78 - 100 fl    MCH 39.0 (H) 26.5 - 33.0 pg    MCHC 35.4 31.5 - 36.5 g/dL    RDW 13.6 10.0 - 15.0 %    Platelet Count 91 (L) 150 - 450 10e9/L    Diff Method Automated Method     % Neutrophils 46.4 %    % Lymphocytes 36.5 %    % Monocytes 12.6 %    % Eosinophils 2.3 %    % Basophils 1.9 %    % Immature Granulocytes 0.3 %    Nucleated RBCs 0 0 /100    Absolute Neutrophil 1.4 (L) 1.6 - 8.3 10e9/L    Absolute Lymphocytes 1.1 0.8 - 5.3 10e9/L    Absolute Monocytes 0.4 0.0 - 1.3 10e9/L    Absolute Eosinophils 0.1 0.0 - 0.7 10e9/L    Absolute Basophils 0.1 0.0 - 0.2 10e9/L    Abs Immature Granulocytes 0.0 0 - 0.4 10e9/L    Absolute Nucleated RBC 0.0    Comprehensive metabolic panel   Result Value Ref Range    Sodium 142 133 - 144 mmol/L    Potassium 3.0 (L) 3.4 - 5.3 mmol/L    Chloride 110 (H) 94 - 109 mmol/L    Carbon Dioxide 24 20 - 32 mmol/L    Anion Gap 8 3 - 14 mmol/L    Glucose 87 70 - 99 mg/dL    Urea Nitrogen 13 7 - 30 mg/dL    Creatinine 0.64 0.52 - 1.04 mg/dL    GFR Estimate >90 >60 mL/min/[1.73_m2]    GFR Estimate If Black >90 >60 mL/min/[1.73_m2]    Calcium 8.8 8.5 - 10.1 mg/dL    Bilirubin Total 0.5 0.2 - 1.3 mg/dL    Albumin 3.8 3.4 - 5.0 g/dL    Protein Total 7.3 6.8 - 8.8 g/dL    Alkaline Phosphatase 259 (H) 40 - 150 U/L     (H) 0 - 50 U/L     (H) 0 - 45 U/L   Salicylate level   Result Value Ref Range    Salicylate Level 4 mg/dL   Magnesium   Result Value Ref Range    Magnesium 1.6 1.6 - 2.3 mg/dL     Rib x-rays / Chest - No fracture    Medications - No data to display    Assessments & Plan (with Medical Decision Making)     54-year-old female presents today with complaints of alcohol intoxication right-sided rib pain and urine tract infection.  Patient  was awake and talking and ambulatory upon arrival.  On exam she did have some tenderness on the right side of her upper chest wall.  There were no bruising or ecchymosis noted.  X-rays of the ribs were done and showed no fracture.  Urine showed urine tract infection.  Patient treated supportively here with antibiotics and was also given with potassium replaced orally.  Patient at no point appears inebriated.  She is going to be discharged home with follow-up with her PCP.      Due to the nature of this electronic medical record, laboratory results, imaging results, diagnosis, other information and medications reported above may not represent information available to me at the the time of my care and disposition. Medications reported above may have not been ordered by me.     Portions of the record may have been created with voice recognition software. Occasional wrong-word or 'sound-a- like' substitution may have occurred due to the inherent limitations of voice recognition software. Though the chart has been reviewed, there may be inadvertent transcription errors. Read the chart carefully and recognize, using context, where substitutions have occurred.       New Prescriptions    No medications on file       Final diagnoses:   Alcoholic intoxication without complication (H)   Acute cystitis without hematuria       6/24/2020   [facility]     Allan Cui MD  06/25/20 2042

## 2020-06-27 LAB
BACTERIA SPEC CULT: ABNORMAL
SPECIMEN SOURCE: ABNORMAL

## 2020-07-21 DIAGNOSIS — J45.20 INTERMITTENT ASTHMA WITHOUT COMPLICATION, UNSPECIFIED ASTHMA SEVERITY: ICD-10-CM

## 2020-07-21 NOTE — TELEPHONE ENCOUNTER
Ventolin HFA       Last Written Prescription Date:  10/31/18  Last Fill Quantity: 1,   # refills: 11  Last Office Visit: 06/12/20  Future Office visit:       Routing refill request to provider for review/approval because:

## 2020-07-23 RX ORDER — ALBUTEROL SULFATE 90 UG/1
2 AEROSOL, METERED RESPIRATORY (INHALATION) EVERY 4 HOURS PRN
Qty: 1 INHALER | Refills: 11 | Status: SHIPPED | OUTPATIENT
Start: 2020-07-23 | End: 2021-01-01

## 2021-01-01 ENCOUNTER — TRANSFERRED RECORDS (OUTPATIENT)
Dept: HEALTH INFORMATION MANAGEMENT | Facility: CLINIC | Age: 56
End: 2021-01-01

## 2021-01-01 ENCOUNTER — HOSPITAL ENCOUNTER (EMERGENCY)
Facility: HOSPITAL | Age: 56
Discharge: HOME | End: 2021-04-05
Attending: EMERGENCY MEDICINE
Payer: COMMERCIAL

## 2021-01-01 ENCOUNTER — APPOINTMENT (OUTPATIENT)
Dept: CT IMAGING | Facility: HOSPITAL | Age: 56
End: 2021-01-01
Attending: EMERGENCY MEDICINE
Payer: COMMERCIAL

## 2021-01-01 ENCOUNTER — OFFICE VISIT (OUTPATIENT)
Dept: SURGERY | Facility: OTHER | Age: 56
End: 2021-01-01
Attending: SURGERY
Payer: COMMERCIAL

## 2021-01-01 ENCOUNTER — HOSPITAL ENCOUNTER (OUTPATIENT)
Dept: ULTRASOUND IMAGING | Facility: HOSPITAL | Age: 56
End: 2021-07-16
Attending: INTERNAL MEDICINE
Payer: COMMERCIAL

## 2021-01-01 ENCOUNTER — HOSPITAL ENCOUNTER (EMERGENCY)
Facility: HOSPITAL | Age: 56
Discharge: ANOTHER HOSPITAL | End: 2021-09-13
Attending: EMERGENCY MEDICINE | Admitting: EMERGENCY MEDICINE
Payer: COMMERCIAL

## 2021-01-01 ENCOUNTER — HOSPITAL ENCOUNTER (OUTPATIENT)
Dept: MRI IMAGING | Facility: HOSPITAL | Age: 56
End: 2021-05-18
Attending: INTERNAL MEDICINE
Payer: COMMERCIAL

## 2021-01-01 ENCOUNTER — ONCOLOGY VISIT (OUTPATIENT)
Dept: ONCOLOGY | Facility: OTHER | Age: 56
End: 2021-01-01
Attending: FAMILY MEDICINE
Payer: COMMERCIAL

## 2021-01-01 ENCOUNTER — HOSPITAL ENCOUNTER (OUTPATIENT)
Facility: HOSPITAL | Age: 56
Discharge: HOME | End: 2021-07-09
Attending: RADIOLOGY | Admitting: RADIOLOGY
Payer: COMMERCIAL

## 2021-01-01 ENCOUNTER — TELEPHONE (OUTPATIENT)
Dept: INTERVENTIONAL RADIOLOGY/VASCULAR | Facility: HOSPITAL | Age: 56
End: 2021-01-01

## 2021-01-01 ENCOUNTER — HOSPITAL ENCOUNTER (OUTPATIENT)
Facility: HOSPITAL | Age: 56
End: 2021-01-01
Admitting: RADIOLOGY
Payer: COMMERCIAL

## 2021-01-01 ENCOUNTER — ANCILLARY PROCEDURE (OUTPATIENT)
Dept: GENERAL RADIOLOGY | Facility: OTHER | Age: 56
End: 2021-01-01
Attending: NURSE PRACTITIONER
Payer: COMMERCIAL

## 2021-01-01 ENCOUNTER — HOSPITAL ENCOUNTER (OUTPATIENT)
Dept: ULTRASOUND IMAGING | Facility: HOSPITAL | Age: 56
End: 2021-08-16
Attending: INTERNAL MEDICINE | Admitting: RADIOLOGY
Payer: COMMERCIAL

## 2021-01-01 ENCOUNTER — APPOINTMENT (OUTPATIENT)
Dept: GENERAL RADIOLOGY | Facility: HOSPITAL | Age: 56
End: 2021-01-01
Attending: EMERGENCY MEDICINE
Payer: COMMERCIAL

## 2021-01-01 ENCOUNTER — HOSPITAL ENCOUNTER (OUTPATIENT)
Dept: ULTRASOUND IMAGING | Facility: HOSPITAL | Age: 56
End: 2021-07-09
Attending: INTERNAL MEDICINE
Payer: COMMERCIAL

## 2021-01-01 ENCOUNTER — OFFICE VISIT (OUTPATIENT)
Dept: FAMILY MEDICINE | Facility: OTHER | Age: 56
End: 2021-01-01
Attending: NURSE PRACTITIONER
Payer: COMMERCIAL

## 2021-01-01 ENCOUNTER — HOSPITAL ENCOUNTER (OUTPATIENT)
Facility: HOSPITAL | Age: 56
Discharge: HOME | End: 2021-09-02
Attending: RADIOLOGY | Admitting: RADIOLOGY
Payer: COMMERCIAL

## 2021-01-01 ENCOUNTER — TELEPHONE (OUTPATIENT)
Dept: FAMILY MEDICINE | Facility: OTHER | Age: 56
End: 2021-01-01

## 2021-01-01 ENCOUNTER — HOSPITAL ENCOUNTER (OUTPATIENT)
Dept: CT IMAGING | Facility: HOSPITAL | Age: 56
End: 2021-05-18
Attending: INTERNAL MEDICINE
Payer: COMMERCIAL

## 2021-01-01 ENCOUNTER — ONCOLOGY VISIT (OUTPATIENT)
Dept: ONCOLOGY | Facility: OTHER | Age: 56
End: 2021-01-01
Attending: INTERNAL MEDICINE
Payer: COMMERCIAL

## 2021-01-01 ENCOUNTER — OFFICE VISIT (OUTPATIENT)
Dept: FAMILY MEDICINE | Facility: OTHER | Age: 56
End: 2021-01-01
Attending: PHYSICIAN ASSISTANT
Payer: COMMERCIAL

## 2021-01-01 ENCOUNTER — NURSE TRIAGE (OUTPATIENT)
Dept: FAMILY MEDICINE | Facility: OTHER | Age: 56
End: 2021-01-01

## 2021-01-01 ENCOUNTER — HOSPITAL ENCOUNTER (OUTPATIENT)
Facility: HOSPITAL | Age: 56
Discharge: HOME | End: 2021-07-27
Attending: RADIOLOGY | Admitting: STUDENT IN AN ORGANIZED HEALTH CARE EDUCATION/TRAINING PROGRAM
Payer: COMMERCIAL

## 2021-01-01 ENCOUNTER — TELEPHONE (OUTPATIENT)
Dept: ONCOLOGY | Facility: OTHER | Age: 56
End: 2021-01-01

## 2021-01-01 ENCOUNTER — HOSPITAL ENCOUNTER (OUTPATIENT)
Dept: ULTRASOUND IMAGING | Facility: HOSPITAL | Age: 56
End: 2021-08-24
Attending: INTERNAL MEDICINE
Payer: COMMERCIAL

## 2021-01-01 ENCOUNTER — HOSPITAL ENCOUNTER (OUTPATIENT)
Facility: HOSPITAL | Age: 56
Discharge: HOME | End: 2021-08-09
Attending: RADIOLOGY | Admitting: RADIOLOGY
Payer: COMMERCIAL

## 2021-01-01 ENCOUNTER — MEDICAL CORRESPONDENCE (OUTPATIENT)
Dept: ULTRASOUND IMAGING | Facility: HOSPITAL | Age: 56
End: 2021-01-01

## 2021-01-01 ENCOUNTER — HOSPITAL ENCOUNTER (OUTPATIENT)
Dept: ULTRASOUND IMAGING | Facility: HOSPITAL | Age: 56
End: 2021-08-02
Attending: INTERNAL MEDICINE | Admitting: RADIOLOGY
Payer: COMMERCIAL

## 2021-01-01 ENCOUNTER — HOSPITAL ENCOUNTER (OUTPATIENT)
Dept: ULTRASOUND IMAGING | Facility: HOSPITAL | Age: 56
Discharge: HOME | End: 2021-08-31
Attending: PHYSICIAN ASSISTANT | Admitting: PHYSICIAN ASSISTANT
Payer: COMMERCIAL

## 2021-01-01 ENCOUNTER — HOSPITAL ENCOUNTER (OUTPATIENT)
Dept: ULTRASOUND IMAGING | Facility: HOSPITAL | Age: 56
End: 2021-07-26
Attending: INTERNAL MEDICINE | Admitting: RADIOLOGY
Payer: COMMERCIAL

## 2021-01-01 ENCOUNTER — TRANSFERRED RECORDS (OUTPATIENT)
Dept: HEALTH INFORMATION MANAGEMENT | Facility: OTHER | Age: 56
End: 2021-01-01

## 2021-01-01 ENCOUNTER — HOSPITAL ENCOUNTER (EMERGENCY)
Facility: HOSPITAL | Age: 56
Discharge: HOME | End: 2021-09-10
Attending: EMERGENCY MEDICINE | Admitting: EMERGENCY MEDICINE
Payer: COMMERCIAL

## 2021-01-01 ENCOUNTER — DOCUMENTATION ONLY (OUTPATIENT)
Dept: INTERVENTIONAL RADIOLOGY/VASCULAR | Facility: HOSPITAL | Age: 56
End: 2021-01-01

## 2021-01-01 ENCOUNTER — APPOINTMENT (OUTPATIENT)
Dept: LAB | Facility: HOSPITAL | Age: 56
End: 2021-01-01
Attending: RADIOLOGY
Payer: COMMERCIAL

## 2021-01-01 ENCOUNTER — HOSPITAL ENCOUNTER (OUTPATIENT)
Dept: ULTRASOUND IMAGING | Facility: HOSPITAL | Age: 56
End: 2021-09-02
Attending: INTERNAL MEDICINE | Admitting: RADIOLOGY
Payer: COMMERCIAL

## 2021-01-01 ENCOUNTER — HOSPITAL ENCOUNTER (OUTPATIENT)
Facility: HOSPITAL | Age: 56
Discharge: HOME | End: 2021-08-02
Attending: RADIOLOGY | Admitting: RADIOLOGY
Payer: COMMERCIAL

## 2021-01-01 ENCOUNTER — RECORDS - HEALTHEAST (OUTPATIENT)
Dept: ADMINISTRATIVE | Facility: CLINIC | Age: 56
End: 2021-01-01

## 2021-01-01 ENCOUNTER — HOSPITAL ENCOUNTER (OUTPATIENT)
Dept: ULTRASOUND IMAGING | Facility: HOSPITAL | Age: 56
End: 2021-06-28
Attending: INTERNAL MEDICINE | Admitting: RADIOLOGY
Payer: COMMERCIAL

## 2021-01-01 ENCOUNTER — HOSPITAL ENCOUNTER (OUTPATIENT)
Dept: CT IMAGING | Facility: HOSPITAL | Age: 56
Discharge: HOME | End: 2021-03-18
Attending: PHYSICIAN ASSISTANT | Admitting: PHYSICIAN ASSISTANT
Payer: COMMERCIAL

## 2021-01-01 ENCOUNTER — HOSPITAL ENCOUNTER (OUTPATIENT)
Facility: HOSPITAL | Age: 56
Discharge: HOME | End: 2021-06-28
Attending: RADIOLOGY | Admitting: RADIOLOGY
Payer: COMMERCIAL

## 2021-01-01 ENCOUNTER — HOSPITAL ENCOUNTER (OUTPATIENT)
Dept: ULTRASOUND IMAGING | Facility: HOSPITAL | Age: 56
End: 2021-08-09
Attending: INTERNAL MEDICINE
Payer: COMMERCIAL

## 2021-01-01 ENCOUNTER — HOSPITAL ENCOUNTER (OUTPATIENT)
Dept: GENERAL RADIOLOGY | Facility: HOSPITAL | Age: 56
Discharge: HOME | End: 2021-07-20
Attending: INTERNAL MEDICINE | Admitting: INTERNAL MEDICINE
Payer: COMMERCIAL

## 2021-01-01 ENCOUNTER — HOSPITAL ENCOUNTER (OUTPATIENT)
Facility: HOSPITAL | Age: 56
Discharge: HOME | End: 2021-08-24
Attending: RADIOLOGY | Admitting: RADIOLOGY
Payer: COMMERCIAL

## 2021-01-01 ENCOUNTER — HOSPITAL ENCOUNTER (OUTPATIENT)
Facility: HOSPITAL | Age: 56
Discharge: HOME | End: 2021-08-16
Attending: RADIOLOGY | Admitting: RADIOLOGY
Payer: COMMERCIAL

## 2021-01-01 ENCOUNTER — HOSPITAL ENCOUNTER (EMERGENCY)
Facility: HOSPITAL | Age: 56
Discharge: HOME | End: 2021-09-07
Attending: NURSE PRACTITIONER | Admitting: NURSE PRACTITIONER
Payer: COMMERCIAL

## 2021-01-01 ENCOUNTER — APPOINTMENT (OUTPATIENT)
Dept: ULTRASOUND IMAGING | Facility: HOSPITAL | Age: 56
End: 2021-01-01
Attending: EMERGENCY MEDICINE
Payer: COMMERCIAL

## 2021-01-01 ENCOUNTER — TELEPHONE (OUTPATIENT)
Dept: CASE MANAGEMENT | Facility: HOSPITAL | Age: 56
End: 2021-01-01

## 2021-01-01 ENCOUNTER — HOSPITAL ENCOUNTER (OUTPATIENT)
Facility: HOSPITAL | Age: 56
Discharge: HOME | End: 2021-07-16
Attending: RADIOLOGY | Admitting: RADIOLOGY
Payer: COMMERCIAL

## 2021-01-01 VITALS
TEMPERATURE: 97.6 F | WEIGHT: 116 LBS | DIASTOLIC BLOOD PRESSURE: 74 MMHG | BODY MASS INDEX: 19.91 KG/M2 | OXYGEN SATURATION: 99 % | HEART RATE: 100 BPM | RESPIRATION RATE: 16 BRPM | SYSTOLIC BLOOD PRESSURE: 131 MMHG

## 2021-01-01 VITALS
SYSTOLIC BLOOD PRESSURE: 95 MMHG | HEART RATE: 92 BPM | RESPIRATION RATE: 16 BRPM | DIASTOLIC BLOOD PRESSURE: 61 MMHG | OXYGEN SATURATION: 100 %

## 2021-01-01 VITALS
OXYGEN SATURATION: 100 % | SYSTOLIC BLOOD PRESSURE: 103 MMHG | DIASTOLIC BLOOD PRESSURE: 49 MMHG | HEART RATE: 97 BPM | RESPIRATION RATE: 18 BRPM

## 2021-01-01 VITALS
HEART RATE: 87 BPM | RESPIRATION RATE: 16 BRPM | OXYGEN SATURATION: 100 % | DIASTOLIC BLOOD PRESSURE: 62 MMHG | SYSTOLIC BLOOD PRESSURE: 101 MMHG

## 2021-01-01 VITALS
WEIGHT: 115.74 LBS | DIASTOLIC BLOOD PRESSURE: 58 MMHG | BODY MASS INDEX: 19.87 KG/M2 | RESPIRATION RATE: 20 BRPM | SYSTOLIC BLOOD PRESSURE: 96 MMHG | TEMPERATURE: 98.1 F | OXYGEN SATURATION: 94 % | HEART RATE: 101 BPM

## 2021-01-01 VITALS
OXYGEN SATURATION: 99 % | SYSTOLIC BLOOD PRESSURE: 106 MMHG | DIASTOLIC BLOOD PRESSURE: 60 MMHG | HEART RATE: 113 BPM | TEMPERATURE: 98.7 F | BODY MASS INDEX: 22.31 KG/M2 | WEIGHT: 130 LBS

## 2021-01-01 VITALS
TEMPERATURE: 99.6 F | HEIGHT: 64 IN | HEART RATE: 115 BPM | DIASTOLIC BLOOD PRESSURE: 60 MMHG | RESPIRATION RATE: 20 BRPM | WEIGHT: 131.61 LBS | SYSTOLIC BLOOD PRESSURE: 100 MMHG | OXYGEN SATURATION: 100 % | BODY MASS INDEX: 22.47 KG/M2

## 2021-01-01 VITALS
DIASTOLIC BLOOD PRESSURE: 52 MMHG | HEIGHT: 64 IN | SYSTOLIC BLOOD PRESSURE: 100 MMHG | RESPIRATION RATE: 16 BRPM | WEIGHT: 110 LBS | HEART RATE: 130 BPM | TEMPERATURE: 98.6 F | OXYGEN SATURATION: 90 % | BODY MASS INDEX: 18.78 KG/M2

## 2021-01-01 VITALS
SYSTOLIC BLOOD PRESSURE: 102 MMHG | DIASTOLIC BLOOD PRESSURE: 60 MMHG | OXYGEN SATURATION: 99 % | RESPIRATION RATE: 16 BRPM | TEMPERATURE: 98.5 F | BODY MASS INDEX: 22.45 KG/M2 | WEIGHT: 130.8 LBS | HEART RATE: 125 BPM

## 2021-01-01 VITALS
SYSTOLIC BLOOD PRESSURE: 121 MMHG | DIASTOLIC BLOOD PRESSURE: 58 MMHG | RESPIRATION RATE: 18 BRPM | HEART RATE: 77 BPM | OXYGEN SATURATION: 100 %

## 2021-01-01 VITALS
HEIGHT: 64 IN | SYSTOLIC BLOOD PRESSURE: 92 MMHG | OXYGEN SATURATION: 100 % | BODY MASS INDEX: 17.88 KG/M2 | RESPIRATION RATE: 16 BRPM | HEART RATE: 99 BPM | TEMPERATURE: 97.8 F | DIASTOLIC BLOOD PRESSURE: 62 MMHG | WEIGHT: 104.72 LBS

## 2021-01-01 VITALS
SYSTOLIC BLOOD PRESSURE: 103 MMHG | DIASTOLIC BLOOD PRESSURE: 70 MMHG | TEMPERATURE: 97.3 F | HEART RATE: 101 BPM | BODY MASS INDEX: 18.78 KG/M2 | WEIGHT: 110 LBS | RESPIRATION RATE: 20 BRPM | HEIGHT: 64 IN | OXYGEN SATURATION: 94 %

## 2021-01-01 VITALS
RESPIRATION RATE: 16 BRPM | DIASTOLIC BLOOD PRESSURE: 64 MMHG | SYSTOLIC BLOOD PRESSURE: 108 MMHG | OXYGEN SATURATION: 99 % | WEIGHT: 126.9 LBS | TEMPERATURE: 98.6 F | HEART RATE: 116 BPM | BODY MASS INDEX: 21.78 KG/M2

## 2021-01-01 VITALS
SYSTOLIC BLOOD PRESSURE: 105 MMHG | RESPIRATION RATE: 16 BRPM | HEART RATE: 90 BPM | OXYGEN SATURATION: 100 % | DIASTOLIC BLOOD PRESSURE: 71 MMHG

## 2021-01-01 VITALS
TEMPERATURE: 98.8 F | BODY MASS INDEX: 19.72 KG/M2 | RESPIRATION RATE: 16 BRPM | HEIGHT: 64 IN | HEART RATE: 108 BPM | DIASTOLIC BLOOD PRESSURE: 78 MMHG | SYSTOLIC BLOOD PRESSURE: 114 MMHG | OXYGEN SATURATION: 99 % | WEIGHT: 115.52 LBS

## 2021-01-01 VITALS
HEART RATE: 87 BPM | OXYGEN SATURATION: 100 % | DIASTOLIC BLOOD PRESSURE: 65 MMHG | SYSTOLIC BLOOD PRESSURE: 91 MMHG | RESPIRATION RATE: 20 BRPM

## 2021-01-01 VITALS
RESPIRATION RATE: 16 BRPM | HEART RATE: 103 BPM | SYSTOLIC BLOOD PRESSURE: 108 MMHG | DIASTOLIC BLOOD PRESSURE: 64 MMHG | OXYGEN SATURATION: 100 %

## 2021-01-01 VITALS
RESPIRATION RATE: 16 BRPM | HEART RATE: 103 BPM | SYSTOLIC BLOOD PRESSURE: 133 MMHG | OXYGEN SATURATION: 100 % | DIASTOLIC BLOOD PRESSURE: 57 MMHG

## 2021-01-01 VITALS
DIASTOLIC BLOOD PRESSURE: 61 MMHG | OXYGEN SATURATION: 98 % | HEART RATE: 91 BPM | RESPIRATION RATE: 16 BRPM | SYSTOLIC BLOOD PRESSURE: 97 MMHG

## 2021-01-01 VITALS
HEART RATE: 100 BPM | RESPIRATION RATE: 16 BRPM | OXYGEN SATURATION: 98 % | SYSTOLIC BLOOD PRESSURE: 116 MMHG | DIASTOLIC BLOOD PRESSURE: 70 MMHG | TEMPERATURE: 99.2 F

## 2021-01-01 DIAGNOSIS — D53.9 MACROCYTIC ANEMIA: ICD-10-CM

## 2021-01-01 DIAGNOSIS — R06.02 SOB (SHORTNESS OF BREATH): ICD-10-CM

## 2021-01-01 DIAGNOSIS — R18.8 OTHER ASCITES: ICD-10-CM

## 2021-01-01 DIAGNOSIS — R05.9 COUGH: Primary | ICD-10-CM

## 2021-01-01 DIAGNOSIS — J44.1 COPD EXACERBATION (H): Primary | ICD-10-CM

## 2021-01-01 DIAGNOSIS — Z87.19 HISTORY OF GI BLEED: ICD-10-CM

## 2021-01-01 DIAGNOSIS — J45.20 INTERMITTENT ASTHMA WITHOUT COMPLICATION, UNSPECIFIED ASTHMA SEVERITY: ICD-10-CM

## 2021-01-01 DIAGNOSIS — Z87.891 HISTORY OF SMOKING: ICD-10-CM

## 2021-01-01 DIAGNOSIS — E87.6 HYPOKALEMIA: Primary | ICD-10-CM

## 2021-01-01 DIAGNOSIS — R05.9 COUGH: ICD-10-CM

## 2021-01-01 DIAGNOSIS — R14.0 BLOATING: ICD-10-CM

## 2021-01-01 DIAGNOSIS — K70.31 ALCOHOLIC CIRRHOSIS OF LIVER WITH ASCITES (H): ICD-10-CM

## 2021-01-01 DIAGNOSIS — E53.8 FOLATE DEFICIENCY: ICD-10-CM

## 2021-01-01 DIAGNOSIS — E87.6 HYPOKALEMIA: ICD-10-CM

## 2021-01-01 DIAGNOSIS — D53.9 MACROCYTIC ANEMIA: Primary | ICD-10-CM

## 2021-01-01 DIAGNOSIS — I63.9 ACUTE CVA (CEREBROVASCULAR ACCIDENT) (H): ICD-10-CM

## 2021-01-01 DIAGNOSIS — D47.2 MGUS (MONOCLONAL GAMMOPATHY OF UNKNOWN SIGNIFICANCE): ICD-10-CM

## 2021-01-01 DIAGNOSIS — J90 PLEURAL EFFUSION: ICD-10-CM

## 2021-01-01 DIAGNOSIS — F34.1 DYSTHYMIC DISORDER: ICD-10-CM

## 2021-01-01 DIAGNOSIS — R10.13 ABDOMINAL PAIN, EPIGASTRIC: Primary | ICD-10-CM

## 2021-01-01 DIAGNOSIS — R06.02 SOB (SHORTNESS OF BREATH): Primary | ICD-10-CM

## 2021-01-01 DIAGNOSIS — D47.2 MGUS (MONOCLONAL GAMMOPATHY OF UNKNOWN SIGNIFICANCE): Primary | ICD-10-CM

## 2021-01-01 DIAGNOSIS — K70.31 ALCOHOLIC CIRRHOSIS OF LIVER WITH ASCITES (H): Primary | ICD-10-CM

## 2021-01-01 DIAGNOSIS — R18.8 OTHER ASCITES: Primary | ICD-10-CM

## 2021-01-01 DIAGNOSIS — D64.9 ANEMIA, UNSPECIFIED TYPE: ICD-10-CM

## 2021-01-01 DIAGNOSIS — R11.11 VOMITING WITHOUT NAUSEA, INTRACTABILITY OF VOMITING NOT SPECIFIED, UNSPECIFIED VOMITING TYPE: ICD-10-CM

## 2021-01-01 DIAGNOSIS — G47.00 PERSISTENT INSOMNIA: ICD-10-CM

## 2021-01-01 DIAGNOSIS — R10.84 ABDOMINAL PAIN, GENERALIZED: ICD-10-CM

## 2021-01-01 DIAGNOSIS — R82.90 ABNORMAL URINE FINDINGS: ICD-10-CM

## 2021-01-01 DIAGNOSIS — R10.84 ABDOMINAL PAIN, GENERALIZED: Primary | ICD-10-CM

## 2021-01-01 DIAGNOSIS — R00.0 TACHYCARDIA: ICD-10-CM

## 2021-01-01 DIAGNOSIS — D47.2 MONOCLONAL PARAPROTEINEMIA: Primary | ICD-10-CM

## 2021-01-01 DIAGNOSIS — R06.02 SHORTNESS OF BREATH: ICD-10-CM

## 2021-01-01 LAB
% MONONUCLEAR CELLS, BODY FLUID: 98 %
ALBUMIN SERPL ELPH-MCNC: 2.8 G/DL (ref 3.7–5.1)
ALBUMIN SERPL ELPH-MCNC: 3 G/DL (ref 3.7–5.1)
ALBUMIN SERPL-MCNC: 1.9 G/DL (ref 3.4–5)
ALBUMIN SERPL-MCNC: 2 G/DL (ref 3.4–5)
ALBUMIN SERPL-MCNC: 2.2 G/DL (ref 3.4–5)
ALBUMIN SERPL-MCNC: 2.3 G/DL (ref 3.4–5)
ALBUMIN SERPL-MCNC: 2.3 G/DL (ref 3.4–5)
ALBUMIN SERPL-MCNC: 2.5 G/DL (ref 3.4–5)
ALBUMIN SERPL-MCNC: 2.6 G/DL (ref 3.4–5)
ALBUMIN UR-MCNC: 30 MG/DL
ALP SERPL-CCNC: 106 U/L (ref 40–150)
ALP SERPL-CCNC: 118 U/L (ref 40–150)
ALP SERPL-CCNC: 124 U/L (ref 40–150)
ALP SERPL-CCNC: 198 U/L (ref 40–150)
ALP SERPL-CCNC: 240 U/L (ref 40–150)
ALP SERPL-CCNC: 243 U/L (ref 40–150)
ALP SERPL-CCNC: 256 U/L (ref 40–150)
ALPHA1 GLOB SERPL ELPH-MCNC: 0.4 G/DL (ref 0.2–0.4)
ALPHA1 GLOB SERPL ELPH-MCNC: 0.5 G/DL (ref 0.2–0.4)
ALPHA2 GLOB SERPL ELPH-MCNC: 0.5 G/DL (ref 0.5–0.9)
ALPHA2 GLOB SERPL ELPH-MCNC: 0.7 G/DL (ref 0.5–0.9)
ALT SERPL W P-5'-P-CCNC: 19 U/L (ref 0–50)
ALT SERPL W P-5'-P-CCNC: 22 U/L (ref 0–50)
ALT SERPL W P-5'-P-CCNC: 24 U/L (ref 0–50)
ALT SERPL W P-5'-P-CCNC: 24 U/L (ref 0–50)
ALT SERPL W P-5'-P-CCNC: 30 U/L (ref 0–50)
ALT SERPL W P-5'-P-CCNC: 45 U/L (ref 0–50)
ALT SERPL W P-5'-P-CCNC: 53 U/L (ref 0–50)
AMMONIA PLAS-SCNC: <10 UMOL/L (ref 10–50)
ANA SER QL IF: NEGATIVE
ANION GAP SERPL CALCULATED.3IONS-SCNC: 10 MMOL/L (ref 3–14)
ANION GAP SERPL CALCULATED.3IONS-SCNC: 10 MMOL/L (ref 3–14)
ANION GAP SERPL CALCULATED.3IONS-SCNC: 11 MMOL/L (ref 3–14)
ANION GAP SERPL CALCULATED.3IONS-SCNC: 11 MMOL/L (ref 3–14)
ANION GAP SERPL CALCULATED.3IONS-SCNC: 5 MMOL/L (ref 3–14)
ANION GAP SERPL CALCULATED.3IONS-SCNC: 9 MMOL/L (ref 3–14)
ANION GAP SERPL CALCULATED.3IONS-SCNC: 9 MMOL/L (ref 3–14)
ANISOCYTOSIS BLD QL SMEAR: SLIGHT
APPEARANCE FLD: CLEAR
APPEARANCE UR: CLEAR
APTT PPP: 30 SECONDS (ref 22–38)
AST SERPL W P-5'-P-CCNC: 150 U/L (ref 0–45)
AST SERPL W P-5'-P-CCNC: 155 U/L (ref 0–45)
AST SERPL W P-5'-P-CCNC: 178 U/L (ref 0–45)
AST SERPL W P-5'-P-CCNC: 182 U/L (ref 0–45)
AST SERPL W P-5'-P-CCNC: 55 U/L (ref 0–45)
AST SERPL W P-5'-P-CCNC: 77 U/L (ref 0–45)
AST SERPL W P-5'-P-CCNC: 88 U/L (ref 0–45)
B-GLOBULIN SERPL ELPH-MCNC: 0.9 G/DL (ref 0.6–1)
B-GLOBULIN SERPL ELPH-MCNC: 0.9 G/DL (ref 0.6–1)
B2 MICROGLOB TUMOR MARKER SER-MCNC: 2.6 MG/L
BACTERIA #/AREA URNS HPF: ABNORMAL /HPF
BACTERIA PLR CULT: NO GROWTH
BACTERIA SPEC CULT: ABNORMAL
BASE EXCESS BLDV CALC-SCNC: -2.3 MMOL/L (ref -7.7–1.9)
BASOPHILS # BLD AUTO: 0 10E3/UL (ref 0–0.2)
BASOPHILS # BLD AUTO: 0.1 10E9/L (ref 0–0.2)
BASOPHILS # BLD MANUAL: 0.1 10E3/UL (ref 0–0.2)
BASOPHILS NFR BLD AUTO: 0 %
BASOPHILS NFR BLD AUTO: 0.4 %
BASOPHILS NFR BLD AUTO: 0.7 %
BASOPHILS NFR BLD AUTO: 0.8 %
BASOPHILS NFR BLD MANUAL: 1 %
BILIRUB DIRECT SERPL-MCNC: 0.6 MG/DL (ref 0–0.2)
BILIRUB DIRECT SERPL-MCNC: 0.8 MG/DL (ref 0–0.2)
BILIRUB SERPL-MCNC: 1.4 MG/DL (ref 0.2–1.3)
BILIRUB SERPL-MCNC: 1.6 MG/DL (ref 0.2–1.3)
BILIRUB SERPL-MCNC: 2.1 MG/DL (ref 0.2–1.3)
BILIRUB SERPL-MCNC: 3.4 MG/DL (ref 0.2–1.3)
BILIRUB SERPL-MCNC: 4.1 MG/DL (ref 0.2–1.3)
BILIRUB SERPL-MCNC: 4.6 MG/DL (ref 0.2–1.3)
BILIRUB SERPL-MCNC: 5.2 MG/DL (ref 0.2–1.3)
BILIRUB UR QL STRIP: ABNORMAL
BUN SERPL-MCNC: 10 MG/DL (ref 7–30)
BUN SERPL-MCNC: 18 MG/DL (ref 7–30)
BUN SERPL-MCNC: 26 MG/DL (ref 7–30)
BUN SERPL-MCNC: 3 MG/DL (ref 7–30)
BUN SERPL-MCNC: 4 MG/DL (ref 7–30)
BUN SERPL-MCNC: 5 MG/DL (ref 7–30)
BUN SERPL-MCNC: 7 MG/DL (ref 7–30)
CALCIUM SERPL-MCNC: 8.8 MG/DL (ref 8.5–10.1)
CALCIUM SERPL-MCNC: 8.9 MG/DL (ref 8.5–10.1)
CALCIUM SERPL-MCNC: 8.9 MG/DL (ref 8.5–10.1)
CALCIUM SERPL-MCNC: 9 MG/DL (ref 8.5–10.1)
CALCIUM SERPL-MCNC: 9.3 MG/DL (ref 8.5–10.1)
CALCIUM SERPL-MCNC: 9.4 MG/DL (ref 8.5–10.1)
CALCIUM SERPL-MCNC: 9.4 MG/DL (ref 8.5–10.1)
CHLORIDE BLD-SCNC: 103 MMOL/L (ref 94–109)
CHLORIDE BLD-SCNC: 107 MMOL/L (ref 94–109)
CHLORIDE BLD-SCNC: 109 MMOL/L (ref 94–109)
CHLORIDE SERPL-SCNC: 103 MMOL/L (ref 94–109)
CHLORIDE SERPL-SCNC: 104 MMOL/L (ref 94–109)
CHLORIDE SERPL-SCNC: 104 MMOL/L (ref 94–109)
CHLORIDE SERPL-SCNC: 106 MMOL/L (ref 94–109)
CO2 SERPL-SCNC: 19 MMOL/L (ref 20–32)
CO2 SERPL-SCNC: 21 MMOL/L (ref 20–32)
CO2 SERPL-SCNC: 22 MMOL/L (ref 20–32)
CO2 SERPL-SCNC: 23 MMOL/L (ref 20–32)
CO2 SERPL-SCNC: 23 MMOL/L (ref 20–32)
CO2 SERPL-SCNC: 24 MMOL/L (ref 20–32)
CO2 SERPL-SCNC: 24 MMOL/L (ref 20–32)
COLOR FLD: YELLOW
COLOR UR AUTO: YELLOW
COPATH REPORT: NORMAL
COPATH REPORT: NORMAL
CREAT SERPL-MCNC: 0.15 MG/DL (ref 0.52–1.04)
CREAT SERPL-MCNC: 0.46 MG/DL (ref 0.52–1.04)
CREAT SERPL-MCNC: 0.47 MG/DL (ref 0.52–1.04)
CREAT SERPL-MCNC: 0.5 MG/DL (ref 0.52–1.04)
CREAT SERPL-MCNC: 0.54 MG/DL (ref 0.52–1.04)
CREAT SERPL-MCNC: 0.65 MG/DL (ref 0.52–1.04)
CREAT SERPL-MCNC: 0.81 MG/DL (ref 0.52–1.04)
DIFFERENTIAL METHOD BLD: ABNORMAL
EOSINOPHIL # BLD AUTO: 0 10E3/UL (ref 0–0.7)
EOSINOPHIL # BLD AUTO: 0 10E9/L (ref 0–0.7)
EOSINOPHIL # BLD AUTO: 0.1 10E9/L (ref 0–0.7)
EOSINOPHIL # BLD AUTO: 0.1 10E9/L (ref 0–0.7)
EOSINOPHIL # BLD MANUAL: 0.1 10E3/UL (ref 0–0.7)
EOSINOPHIL NFR BLD AUTO: 0 %
EOSINOPHIL NFR BLD AUTO: 0.2 %
EOSINOPHIL NFR BLD AUTO: 0.7 %
EOSINOPHIL NFR BLD AUTO: 0.9 %
EOSINOPHIL NFR BLD MANUAL: 1 %
ERYTHROCYTE [DISTWIDTH] IN BLOOD BY AUTOMATED COUNT: 12.9 % (ref 10–15)
ERYTHROCYTE [DISTWIDTH] IN BLOOD BY AUTOMATED COUNT: 12.9 % (ref 10–15)
ERYTHROCYTE [DISTWIDTH] IN BLOOD BY AUTOMATED COUNT: 13.4 % (ref 10–15)
ERYTHROCYTE [DISTWIDTH] IN BLOOD BY AUTOMATED COUNT: 14.1 % (ref 10–15)
ERYTHROCYTE [DISTWIDTH] IN BLOOD BY AUTOMATED COUNT: 16.9 % (ref 10–15)
ERYTHROCYTE [DISTWIDTH] IN BLOOD BY AUTOMATED COUNT: 19.5 % (ref 10–15)
ERYTHROCYTE [DISTWIDTH] IN BLOOD BY AUTOMATED COUNT: 20.5 % (ref 10–15)
ERYTHROCYTE [SEDIMENTATION RATE] IN BLOOD BY WESTERGREN METHOD: 77 MM/H (ref 0–30)
FERRITIN SERPL-MCNC: 150 NG/ML (ref 8–252)
FERRITIN SERPL-MCNC: 25 NG/ML (ref 8–252)
FERRITIN SERPL-MCNC: 61 NG/ML (ref 8–252)
FOLATE SERPL-MCNC: 1.7 NG/ML
FOLATE SERPL-MCNC: 14.1 NG/ML
FOLATE SERPL-MCNC: 2.7 NG/ML
FOLATE SERPL-MCNC: 38.7 NG/ML
GAMMA GLOB SERPL ELPH-MCNC: 1.4 G/DL (ref 0.7–1.6)
GAMMA GLOB SERPL ELPH-MCNC: 2.1 G/DL (ref 0.7–1.6)
GFR SERPL CREATININE-BSD FRML MDRD: 83 ML/MIN/1.73M2
GFR SERPL CREATININE-BSD FRML MDRD: >90 ML/MIN/1.73M2
GFR SERPL CREATININE-BSD FRML MDRD: >90 ML/MIN/1.73M2
GFR SERPL CREATININE-BSD FRML MDRD: >90 ML/MIN/{1.73_M2}
GLUCOSE BLD-MCNC: 101 MG/DL (ref 70–99)
GLUCOSE BLD-MCNC: 107 MG/DL (ref 70–99)
GLUCOSE BLD-MCNC: 129 MG/DL (ref 70–99)
GLUCOSE SERPL-MCNC: 102 MG/DL (ref 70–99)
GLUCOSE SERPL-MCNC: 105 MG/DL (ref 70–99)
GLUCOSE SERPL-MCNC: 91 MG/DL (ref 70–99)
GLUCOSE SERPL-MCNC: 97 MG/DL (ref 70–99)
GLUCOSE UR STRIP-MCNC: NEGATIVE MG/DL
GRAM STAIN RESULT: NORMAL
GRAM STAIN RESULT: NORMAL
HAPTOGLOB SERPL-MCNC: 91 MG/DL (ref 32–197)
HCO3 BLDV-SCNC: 22 MMOL/L (ref 21–28)
HCT VFR BLD AUTO: 24.3 % (ref 35–47)
HCT VFR BLD AUTO: 24.4 % (ref 35–47)
HCT VFR BLD AUTO: 25.3 % (ref 35–47)
HCT VFR BLD AUTO: 26.3 % (ref 35–47)
HCT VFR BLD AUTO: 26.9 % (ref 35–47)
HCT VFR BLD AUTO: 27 % (ref 35–47)
HCT VFR BLD AUTO: 27.3 % (ref 35–47)
HGB BLD-MCNC: 8.1 G/DL (ref 11.7–15.7)
HGB BLD-MCNC: 8.1 G/DL (ref 11.7–15.7)
HGB BLD-MCNC: 8.2 G/DL (ref 11.7–15.7)
HGB BLD-MCNC: 8.5 G/DL (ref 11.7–15.7)
HGB BLD-MCNC: 8.8 G/DL (ref 11.7–15.7)
HGB UR QL STRIP: NEGATIVE
HOLD SPECIMEN: NORMAL
IGA SERPL-MCNC: 471 MG/DL (ref 84–499)
IGA SERPL-MCNC: 761 MG/DL (ref 84–499)
IGG SERPL-MCNC: 1436 MG/DL (ref 610–1616)
IGG SERPL-MCNC: 1830 MG/DL (ref 610–1616)
IGM SERPL-MCNC: 128 MG/DL (ref 35–242)
IGM SERPL-MCNC: 172 MG/DL (ref 35–242)
IMM GRANULOCYTES # BLD: 0.1 10E3/UL
IMM GRANULOCYTES # BLD: 0.1 10E9/L (ref 0–0.4)
IMM GRANULOCYTES # BLD: 0.1 10E9/L (ref 0–0.4)
IMM GRANULOCYTES NFR BLD: 0.4 %
IMM GRANULOCYTES NFR BLD: 0.6 %
IMM GRANULOCYTES NFR BLD: 1 %
INR PPP: 1.33 (ref 0.85–1.15)
INR PPP: 1.33 (ref 0.85–1.15)
INR PPP: 1.37 (ref 0.86–1.14)
INR PPP: 1.41 (ref 0.85–1.15)
INR PPP: 1.43 (ref 0.85–1.15)
IRON SATN MFR SERPL: 15 % (ref 15–46)
IRON SATN MFR SERPL: 32 % (ref 15–46)
IRON SATN MFR SERPL: 37 % (ref 15–46)
IRON SERPL-MCNC: 37 UG/DL (ref 35–180)
IRON SERPL-MCNC: 58 UG/DL (ref 35–180)
IRON SERPL-MCNC: 66 UG/DL (ref 35–180)
KAPPA LC FREE SER-MCNC: 8.52 MG/DL (ref 0.33–1.94)
KAPPA LC FREE/LAMBDA FREE SER NEPH: 0.97 {RATIO} (ref 0.26–1.65)
KETONES UR STRIP-MCNC: 15 MG/DL
LAMBDA LC FREE SERPL-MCNC: 8.74 MG/DL (ref 0.57–2.63)
LDH FLD L TO P-CCNC: 62 U/L
LDH SERPL L TO P-CCNC: 167 U/L (ref 81–234)
LDH SERPL L TO P-CCNC: 340 U/L (ref 81–234)
LEUKOCYTE ESTERASE UR QL STRIP: ABNORMAL
LIPASE SERPL-CCNC: 190 U/L (ref 73–393)
LIPASE SERPL-CCNC: 221 U/L (ref 73–393)
LYMPHOCYTES # BLD AUTO: 0.5 10E3/UL (ref 0.8–5.3)
LYMPHOCYTES # BLD AUTO: 0.7 10E9/L (ref 0.8–5.3)
LYMPHOCYTES # BLD AUTO: 0.8 10E9/L (ref 0.8–5.3)
LYMPHOCYTES # BLD AUTO: 0.9 10E9/L (ref 0.8–5.3)
LYMPHOCYTES # BLD MANUAL: 0.9 10E3/UL (ref 0.8–5.3)
LYMPHOCYTES NFR BLD AUTO: 5 %
LYMPHOCYTES NFR BLD AUTO: 6 %
LYMPHOCYTES NFR BLD AUTO: 6.2 %
LYMPHOCYTES NFR BLD AUTO: 7.3 %
LYMPHOCYTES NFR BLD MANUAL: 13 %
M PROTEIN SERPL ELPH-MCNC: 0 G/DL
M PROTEIN SERPL ELPH-MCNC: 0.1 G/DL
MACROCYTES BLD QL SMEAR: PRESENT
MCH RBC QN AUTO: 31.5 PG (ref 26.5–33)
MCH RBC QN AUTO: 32.5 PG (ref 26.5–33)
MCH RBC QN AUTO: 36.7 PG (ref 26.5–33)
MCH RBC QN AUTO: 38.3 PG (ref 26.5–33)
MCH RBC QN AUTO: 39.9 PG (ref 26.5–33)
MCH RBC QN AUTO: 40.4 PG (ref 26.5–33)
MCH RBC QN AUTO: 42.9 PG (ref 26.5–33)
MCHC RBC AUTO-ENTMCNC: 31.1 G/DL (ref 31.5–36.5)
MCHC RBC AUTO-ENTMCNC: 31.6 G/DL (ref 31.5–36.5)
MCHC RBC AUTO-ENTMCNC: 32.3 G/DL (ref 31.5–36.5)
MCHC RBC AUTO-ENTMCNC: 32.4 G/DL (ref 31.5–36.5)
MCHC RBC AUTO-ENTMCNC: 32.6 G/DL (ref 31.5–36.5)
MCHC RBC AUTO-ENTMCNC: 33.2 G/DL (ref 31.5–36.5)
MCHC RBC AUTO-ENTMCNC: 33.3 G/DL (ref 31.5–36.5)
MCV RBC AUTO: 100 FL (ref 78–100)
MCV RBC AUTO: 100 FL (ref 78–100)
MCV RBC AUTO: 110 FL (ref 78–100)
MCV RBC AUTO: 120 FL (ref 78–100)
MCV RBC AUTO: 123 FL (ref 78–100)
MCV RBC AUTO: 125 FL (ref 78–100)
MCV RBC AUTO: 133 FL (ref 78–100)
MONOCYTES # BLD AUTO: 0.8 10E3/UL (ref 0–1.3)
MONOCYTES # BLD AUTO: 1.2 10E9/L (ref 0–1.3)
MONOCYTES # BLD AUTO: 1.3 10E9/L (ref 0–1.3)
MONOCYTES # BLD AUTO: 1.3 10E9/L (ref 0–1.3)
MONOCYTES # BLD MANUAL: 0.4 10E3/UL (ref 0–1.3)
MONOCYTES NFR BLD AUTO: 10.1 %
MONOCYTES NFR BLD AUTO: 10.3 %
MONOCYTES NFR BLD AUTO: 8 %
MONOCYTES NFR BLD AUTO: 9.5 %
MONOCYTES NFR BLD MANUAL: 6 %
NEUTROPHILS # BLD AUTO: 10.1 10E9/L (ref 1.6–8.3)
NEUTROPHILS # BLD AUTO: 10.2 10E9/L (ref 1.6–8.3)
NEUTROPHILS # BLD AUTO: 10.6 10E9/L (ref 1.6–8.3)
NEUTROPHILS # BLD AUTO: 8.2 10E3/UL (ref 1.6–8.3)
NEUTROPHILS # BLD MANUAL: 5.4 10E3/UL (ref 1.6–8.3)
NEUTROPHILS NFR BLD AUTO: 80.8 %
NEUTROPHILS NFR BLD AUTO: 81.9 %
NEUTROPHILS NFR BLD AUTO: 83.2 %
NEUTROPHILS NFR BLD AUTO: 86 %
NEUTROPHILS NFR BLD MANUAL: 79 %
NEUTS BAND NFR FLD MANUAL: 2 %
NITRATE UR QL: POSITIVE
NON-SQ EPI CELLS #/AREA URNS LPF: ABNORMAL /LPF
NRBC # BLD AUTO: 0 10*3/UL
NRBC # BLD AUTO: 0 10*3/UL
NRBC # BLD AUTO: 0 10E3/UL
NRBC BLD AUTO-RTO: 0 /100
NT-PROBNP SERPL-MCNC: 537 PG/ML (ref 0–900)
O2/TOTAL GAS SETTING VFR VENT: 21 %
OXYHGB MFR BLDV: 55 % (ref 70–75)
PCO2 BLDV: 33 MM HG (ref 40–50)
PH BLDV: 7.42 [PH] (ref 7.32–7.43)
PH UR STRIP: 5.5 PH (ref 5–7)
PLAT MORPH BLD: NORMAL
PLATELET # BLD AUTO: 166 10E3/UL (ref 150–450)
PLATELET # BLD AUTO: 179 10E3/UL (ref 150–450)
PLATELET # BLD AUTO: 197 10E3/UL (ref 150–450)
PLATELET # BLD AUTO: 203 10E3/UL (ref 150–450)
PLATELET # BLD AUTO: 233 10E3/UL (ref 150–450)
PLATELET # BLD AUTO: 235 10E9/L (ref 150–450)
PLATELET # BLD AUTO: 252 10E9/L (ref 150–450)
PLATELET # BLD AUTO: 258 10E9/L (ref 150–450)
PLATELET # BLD AUTO: 327 10E9/L (ref 150–450)
PLATELET # BLD EST: ABNORMAL 10*3/UL
PO2 BLDV: 32 MM HG (ref 25–47)
POTASSIUM BLD-SCNC: 2.9 MMOL/L (ref 3.4–5.3)
POTASSIUM BLD-SCNC: 3.3 MMOL/L (ref 3.4–5.3)
POTASSIUM BLD-SCNC: 4.2 MMOL/L (ref 3.4–5.3)
POTASSIUM SERPL-SCNC: 2.8 MMOL/L (ref 3.4–5.3)
POTASSIUM SERPL-SCNC: 3.1 MMOL/L (ref 3.4–5.3)
POTASSIUM SERPL-SCNC: 3.2 MMOL/L (ref 3.4–5.3)
POTASSIUM SERPL-SCNC: 3.4 MMOL/L (ref 3.4–5.3)
PROT FLD-MCNC: 1.3 G/DL
PROT PATTERN SERPL ELPH-IMP: ABNORMAL
PROT PATTERN SERPL ELPH-IMP: ABNORMAL
PROT PATTERN SERPL IFE-IMP: NORMAL
PROT PATTERN SERPL IFE-IMP: NORMAL
PROT SERPL-MCNC: 6.1 G/DL (ref 6.8–8.8)
PROT SERPL-MCNC: 6.5 G/DL (ref 6.8–8.8)
PROT SERPL-MCNC: 6.6 G/DL (ref 6.8–8.8)
PROT SERPL-MCNC: 6.6 G/DL (ref 6.8–8.8)
PROT SERPL-MCNC: 6.7 G/DL (ref 6.8–8.8)
PROT SERPL-MCNC: 7.1 G/DL (ref 6.8–8.8)
PROT SERPL-MCNC: 7.2 G/DL (ref 6.8–8.8)
RBC # BLD AUTO: 1.98 10E12/L (ref 3.8–5.2)
RBC # BLD AUTO: 2.03 10E12/L (ref 3.8–5.2)
RBC # BLD AUTO: 2.03 10E12/L (ref 3.8–5.2)
RBC # BLD AUTO: 2.21 10E6/UL (ref 3.8–5.2)
RBC # BLD AUTO: 2.22 10E12/L (ref 3.8–5.2)
RBC # BLD AUTO: 2.7 10E6/UL (ref 3.8–5.2)
RBC # BLD AUTO: 2.71 10E6/UL (ref 3.8–5.2)
RBC # FLD: 362 /UL
RBC #/AREA URNS AUTO: ABNORMAL /HPF
RBC MORPH BLD: NORMAL
RETICS # AUTO: 58.1 10E9/L (ref 25–95)
RETICS # AUTO: 61.6 10E9/L (ref 25–95)
RETICS/RBC NFR AUTO: 2.9 % (ref 0.5–2)
RETICS/RBC NFR AUTO: 2.9 % (ref 0.5–2)
RHEUMATOID FACT SER NEPH-ACNC: <7 IU/ML (ref 0–20)
SARS-COV-2 RNA RESP QL NAA+PROBE: NEGATIVE
SODIUM SERPL-SCNC: 135 MMOL/L (ref 133–144)
SODIUM SERPL-SCNC: 136 MMOL/L (ref 133–144)
SODIUM SERPL-SCNC: 137 MMOL/L (ref 133–144)
SODIUM SERPL-SCNC: 138 MMOL/L (ref 133–144)
SOURCE: ABNORMAL
SP GR UR STRIP: 1.02 (ref 1–1.03)
SPECIMEN SOURCE: ABNORMAL
TIBC SERPL-MCNC: 180 UG/DL (ref 240–430)
TIBC SERPL-MCNC: 183 UG/DL (ref 240–430)
TIBC SERPL-MCNC: 250 UG/DL (ref 240–430)
TOTAL PROTEIN SERUM FOR ELP: 6.9 G/DL (ref 6.8–8.8)
TROPONIN I SERPL-MCNC: <0.015 UG/L (ref 0–0.04)
TROPONIN I SERPL-MCNC: <0.015 UG/L (ref 0–0.04)
TSH SERPL DL<=0.005 MIU/L-ACNC: 2.78 MU/L (ref 0.4–4)
UROBILINOGEN UR STRIP-ACNC: 4 EU/DL (ref 0.2–1)
VISC SER: 1.6 CP (ref 1.4–1.8)
VIT B12 SERPL-MCNC: 370 PG/ML (ref 193–986)
VIT B12 SERPL-MCNC: 539 PG/ML (ref 193–986)
VIT B12 SERPL-MCNC: 632 PG/ML (ref 193–986)
VIT B12 SERPL-MCNC: 860 PG/ML (ref 193–986)
WBC # BLD AUTO: 12.3 10E9/L (ref 4–11)
WBC # BLD AUTO: 12.5 10E9/L (ref 4–11)
WBC # BLD AUTO: 12.8 10E9/L (ref 4–11)
WBC # BLD AUTO: 13 10E9/L (ref 4–11)
WBC # BLD AUTO: 6.8 10E3/UL (ref 4–11)
WBC # BLD AUTO: 7.8 10E3/UL (ref 4–11)
WBC # BLD AUTO: 9.5 10E3/UL (ref 4–11)
WBC # FLD AUTO: 412 /UL
WBC #/AREA URNS AUTO: ABNORMAL /HPF

## 2021-01-01 PROCEDURE — 250N000009 HC RX 250: Performed by: EMERGENCY MEDICINE

## 2021-01-01 PROCEDURE — 86038 ANTINUCLEAR ANTIBODIES: CPT | Mod: ZL | Performed by: INTERNAL MEDICINE

## 2021-01-01 PROCEDURE — 81001 URINALYSIS AUTO W/SCOPE: CPT | Mod: ZL | Performed by: PHYSICIAN ASSISTANT

## 2021-01-01 PROCEDURE — 83880 ASSAY OF NATRIURETIC PEPTIDE: CPT | Performed by: EMERGENCY MEDICINE

## 2021-01-01 PROCEDURE — 49083 ABD PARACENTESIS W/IMAGING: CPT

## 2021-01-01 PROCEDURE — 93005 ELECTROCARDIOGRAM TRACING: CPT

## 2021-01-01 PROCEDURE — 999N001109 HC STATISTIC MORPHOLOGY W/INTERP HISTOLOGY TC 85060: Mod: ZL | Performed by: PHYSICIAN ASSISTANT

## 2021-01-01 PROCEDURE — C9803 HOPD COVID-19 SPEC COLLECT: HCPCS

## 2021-01-01 PROCEDURE — 80048 BASIC METABOLIC PNL TOTAL CA: CPT | Performed by: EMERGENCY MEDICINE

## 2021-01-01 PROCEDURE — 82784 ASSAY IGA/IGD/IGG/IGM EACH: CPT | Mod: ZL | Performed by: INTERNAL MEDICINE

## 2021-01-01 PROCEDURE — 71046 X-RAY EXAM CHEST 2 VIEWS: CPT | Mod: TC,FY

## 2021-01-01 PROCEDURE — G0463 HOSPITAL OUTPT CLINIC VISIT: HCPCS

## 2021-01-01 PROCEDURE — 85049 AUTOMATED PLATELET COUNT: CPT | Performed by: RADIOLOGY

## 2021-01-01 PROCEDURE — 84155 ASSAY OF PROTEIN SERUM: CPT | Performed by: INTERNAL MEDICINE

## 2021-01-01 PROCEDURE — 99285 EMERGENCY DEPT VISIT HI MDM: CPT | Mod: 25

## 2021-01-01 PROCEDURE — 83883 ASSAY NEPHELOMETRY NOT SPEC: CPT | Performed by: INTERNAL MEDICINE

## 2021-01-01 PROCEDURE — 96360 HYDRATION IV INFUSION INIT: CPT | Mod: XU

## 2021-01-01 PROCEDURE — 83550 IRON BINDING TEST: CPT | Mod: ZL | Performed by: PHYSICIAN ASSISTANT

## 2021-01-01 PROCEDURE — G0463 HOSPITAL OUTPT CLINIC VISIT: HCPCS | Mod: 25,27

## 2021-01-01 PROCEDURE — 85025 COMPLETE CBC W/AUTO DIFF WBC: CPT | Mod: ZL | Performed by: NURSE PRACTITIONER

## 2021-01-01 PROCEDURE — 85610 PROTHROMBIN TIME: CPT | Performed by: EMERGENCY MEDICINE

## 2021-01-01 PROCEDURE — 82728 ASSAY OF FERRITIN: CPT | Performed by: INTERNAL MEDICINE

## 2021-01-01 PROCEDURE — 85025 COMPLETE CBC W/AUTO DIFF WBC: CPT | Mod: ZL | Performed by: PHYSICIAN ASSISTANT

## 2021-01-01 PROCEDURE — 83615 LACTATE (LD) (LDH) ENZYME: CPT | Performed by: EMERGENCY MEDICINE

## 2021-01-01 PROCEDURE — 87070 CULTURE OTHR SPECIMN AEROBIC: CPT | Performed by: EMERGENCY MEDICINE

## 2021-01-01 PROCEDURE — 82607 VITAMIN B-12: CPT | Mod: ZL | Performed by: PHYSICIAN ASSISTANT

## 2021-01-01 PROCEDURE — 88305 TISSUE EXAM BY PATHOLOGIST: CPT | Mod: TC | Performed by: RADIOLOGY

## 2021-01-01 PROCEDURE — 88108 CYTOPATH CONCENTRATE TECH: CPT | Mod: TC | Performed by: RADIOLOGY

## 2021-01-01 PROCEDURE — 36415 COLL VENOUS BLD VENIPUNCTURE: CPT | Performed by: EMERGENCY MEDICINE

## 2021-01-01 PROCEDURE — 86334 IMMUNOFIX E-PHORESIS SERUM: CPT | Mod: TC,ZL | Performed by: INTERNAL MEDICINE

## 2021-01-01 PROCEDURE — 82728 ASSAY OF FERRITIN: CPT | Mod: ZL | Performed by: PHYSICIAN ASSISTANT

## 2021-01-01 PROCEDURE — 82607 VITAMIN B-12: CPT | Mod: ZL | Performed by: INTERNAL MEDICINE

## 2021-01-01 PROCEDURE — 250N000009 HC RX 250

## 2021-01-01 PROCEDURE — 84165 PROTEIN E-PHORESIS SERUM: CPT | Performed by: INTERNAL MEDICINE

## 2021-01-01 PROCEDURE — 82040 ASSAY OF SERUM ALBUMIN: CPT | Performed by: EMERGENCY MEDICINE

## 2021-01-01 PROCEDURE — 250N000009 HC RX 250: Performed by: RADIOLOGY

## 2021-01-01 PROCEDURE — 32555 ASPIRATE PLEURA W/ IMAGING: CPT | Performed by: EMERGENCY MEDICINE

## 2021-01-01 PROCEDURE — G0463 HOSPITAL OUTPT CLINIC VISIT: HCPCS | Mod: 25

## 2021-01-01 PROCEDURE — 82746 ASSAY OF FOLIC ACID SERUM: CPT | Mod: ZL | Performed by: INTERNAL MEDICINE

## 2021-01-01 PROCEDURE — 83540 ASSAY OF IRON: CPT | Mod: ZL | Performed by: INTERNAL MEDICINE

## 2021-01-01 PROCEDURE — 71260 CT THORAX DX C+: CPT

## 2021-01-01 PROCEDURE — 36415 COLL VENOUS BLD VENIPUNCTURE: CPT | Mod: ZL | Performed by: PHYSICIAN ASSISTANT

## 2021-01-01 PROCEDURE — 255N000002 HC RX 255 OP 636: Performed by: RADIOLOGY

## 2021-01-01 PROCEDURE — 82805 BLOOD GASES W/O2 SATURATION: CPT | Performed by: EMERGENCY MEDICINE

## 2021-01-01 PROCEDURE — A9585 GADOBUTROL INJECTION: HCPCS | Performed by: RADIOLOGY

## 2021-01-01 PROCEDURE — 82248 BILIRUBIN DIRECT: CPT | Performed by: INTERNAL MEDICINE

## 2021-01-01 PROCEDURE — 82140 ASSAY OF AMMONIA: CPT | Performed by: EMERGENCY MEDICINE

## 2021-01-01 PROCEDURE — 89050 BODY FLUID CELL COUNT: CPT | Performed by: EMERGENCY MEDICINE

## 2021-01-01 PROCEDURE — 84157 ASSAY OF PROTEIN OTHER: CPT | Performed by: EMERGENCY MEDICINE

## 2021-01-01 PROCEDURE — U0003 INFECTIOUS AGENT DETECTION BY NUCLEIC ACID (DNA OR RNA); SEVERE ACUTE RESPIRATORY SYNDROME CORONAVIRUS 2 (SARS-COV-2) (CORONAVIRUS DISEASE [COVID-19]), AMPLIFIED PROBE TECHNIQUE, MAKING USE OF HIGH THROUGHPUT TECHNOLOGIES AS DESCRIBED BY CMS-2020-01-R: HCPCS | Performed by: EMERGENCY MEDICINE

## 2021-01-01 PROCEDURE — 36415 COLL VENOUS BLD VENIPUNCTURE: CPT | Performed by: RADIOLOGY

## 2021-01-01 PROCEDURE — 70498 CT ANGIOGRAPHY NECK: CPT

## 2021-01-01 PROCEDURE — 99215 OFFICE O/P EST HI 40 MIN: CPT | Performed by: INTERNAL MEDICINE

## 2021-01-01 PROCEDURE — 87086 URINE CULTURE/COLONY COUNT: CPT | Mod: ZL | Performed by: PHYSICIAN ASSISTANT

## 2021-01-01 PROCEDURE — 99214 OFFICE O/P EST MOD 30 MIN: CPT | Performed by: NURSE PRACTITIONER

## 2021-01-01 PROCEDURE — 85045 AUTOMATED RETICULOCYTE COUNT: CPT | Mod: ZL | Performed by: INTERNAL MEDICINE

## 2021-01-01 PROCEDURE — 84443 ASSAY THYROID STIM HORMONE: CPT | Mod: ZL | Performed by: PHYSICIAN ASSISTANT

## 2021-01-01 PROCEDURE — 85610 PROTHROMBIN TIME: CPT | Performed by: RADIOLOGY

## 2021-01-01 PROCEDURE — 86431 RHEUMATOID FACTOR QUANT: CPT | Mod: ZL | Performed by: INTERNAL MEDICINE

## 2021-01-01 PROCEDURE — 999N000157 HC STATISTIC RCP TIME EA 10 MIN

## 2021-01-01 PROCEDURE — 77073 BONE LENGTH STUDIES: CPT

## 2021-01-01 PROCEDURE — U0003 INFECTIOUS AGENT DETECTION BY NUCLEIC ACID (DNA OR RNA); SEVERE ACUTE RESPIRATORY SYNDROME CORONAVIRUS 2 (SARS-COV-2) (CORONAVIRUS DISEASE [COVID-19]), AMPLIFIED PROBE TECHNIQUE, MAKING USE OF HIGH THROUGHPUT TECHNOLOGIES AS DESCRIBED BY CMS-2020-01-R: HCPCS | Performed by: NURSE PRACTITIONER

## 2021-01-01 PROCEDURE — 89051 BODY FLUID CELL COUNT: CPT | Performed by: EMERGENCY MEDICINE

## 2021-01-01 PROCEDURE — 93880 EXTRACRANIAL BILAT STUDY: CPT

## 2021-01-01 PROCEDURE — 80053 COMPREHEN METABOLIC PANEL: CPT | Mod: ZL | Performed by: PHYSICIAN ASSISTANT

## 2021-01-01 PROCEDURE — 84484 ASSAY OF TROPONIN QUANT: CPT | Performed by: EMERGENCY MEDICINE

## 2021-01-01 PROCEDURE — 999N001014 HC STATISTIC CYTO WRIGHT STAIN TC: Performed by: RADIOLOGY

## 2021-01-01 PROCEDURE — 99205 OFFICE O/P NEW HI 60 MIN: CPT | Performed by: INTERNAL MEDICINE

## 2021-01-01 PROCEDURE — 82746 ASSAY OF FOLIC ACID SERUM: CPT | Performed by: INTERNAL MEDICINE

## 2021-01-01 PROCEDURE — 74177 CT ABD & PELVIS W/CONTRAST: CPT

## 2021-01-01 PROCEDURE — 85730 THROMBOPLASTIN TIME PARTIAL: CPT | Performed by: EMERGENCY MEDICINE

## 2021-01-01 PROCEDURE — 82728 ASSAY OF FERRITIN: CPT | Mod: ZL | Performed by: INTERNAL MEDICINE

## 2021-01-01 PROCEDURE — 80053 COMPREHEN METABOLIC PANEL: CPT | Performed by: EMERGENCY MEDICINE

## 2021-01-01 PROCEDURE — 36415 COLL VENOUS BLD VENIPUNCTURE: CPT | Mod: ZL | Performed by: NURSE PRACTITIONER

## 2021-01-01 PROCEDURE — 99284 EMERGENCY DEPT VISIT MOD MDM: CPT | Mod: 25 | Performed by: EMERGENCY MEDICINE

## 2021-01-01 PROCEDURE — 999N001036 HC STATISTIC TOTAL PROTEIN: Mod: ZL | Performed by: INTERNAL MEDICINE

## 2021-01-01 PROCEDURE — 250N000011 HC RX IP 250 OP 636: Performed by: EMERGENCY MEDICINE

## 2021-01-01 PROCEDURE — 258N000003 HC RX IP 258 OP 636: Performed by: EMERGENCY MEDICINE

## 2021-01-01 PROCEDURE — 272N000042 US PARACENTESIS

## 2021-01-01 PROCEDURE — 74183 MRI ABD W/O CNTR FLWD CNTR: CPT

## 2021-01-01 PROCEDURE — 32551 INSERTION OF CHEST TUBE: CPT

## 2021-01-01 PROCEDURE — 999N000065 XR CHEST 2 VW

## 2021-01-01 PROCEDURE — 999N001018 HC STATISTIC H-CELL BLOCK W/STAIN: Performed by: RADIOLOGY

## 2021-01-01 PROCEDURE — 82232 ASSAY OF BETA-2 PROTEIN: CPT | Performed by: INTERNAL MEDICINE

## 2021-01-01 PROCEDURE — 85045 AUTOMATED RETICULOCYTE COUNT: CPT | Mod: ZL | Performed by: PHYSICIAN ASSISTANT

## 2021-01-01 PROCEDURE — 84165 PROTEIN E-PHORESIS SERUM: CPT | Mod: 26 | Performed by: PATHOLOGY

## 2021-01-01 PROCEDURE — 82784 ASSAY IGA/IGD/IGG/IGM EACH: CPT | Performed by: INTERNAL MEDICINE

## 2021-01-01 PROCEDURE — 82040 ASSAY OF SERUM ALBUMIN: CPT | Performed by: INTERNAL MEDICINE

## 2021-01-01 PROCEDURE — 83615 LACTATE (LD) (LDH) ENZYME: CPT | Performed by: INTERNAL MEDICINE

## 2021-01-01 PROCEDURE — 70450 CT HEAD/BRAIN W/O DYE: CPT

## 2021-01-01 PROCEDURE — 99214 OFFICE O/P EST MOD 30 MIN: CPT | Performed by: INTERNAL MEDICINE

## 2021-01-01 PROCEDURE — 85810 BLOOD VISCOSITY EXAMINATION: CPT | Performed by: INTERNAL MEDICINE

## 2021-01-01 PROCEDURE — 99283 EMERGENCY DEPT VISIT LOW MDM: CPT

## 2021-01-01 PROCEDURE — 85027 COMPLETE CBC AUTOMATED: CPT | Performed by: INTERNAL MEDICINE

## 2021-01-01 PROCEDURE — 83690 ASSAY OF LIPASE: CPT | Mod: ZL | Performed by: PHYSICIAN ASSISTANT

## 2021-01-01 PROCEDURE — 85027 COMPLETE CBC AUTOMATED: CPT | Performed by: EMERGENCY MEDICINE

## 2021-01-01 PROCEDURE — 250N000013 HC RX MED GY IP 250 OP 250 PS 637: Performed by: INTERNAL MEDICINE

## 2021-01-01 PROCEDURE — 71045 X-RAY EXAM CHEST 1 VIEW: CPT

## 2021-01-01 PROCEDURE — 86334 IMMUNOFIX E-PHORESIS SERUM: CPT | Mod: 26 | Performed by: PATHOLOGY

## 2021-01-01 PROCEDURE — 94640 AIRWAY INHALATION TREATMENT: CPT

## 2021-01-01 PROCEDURE — U0005 INFEC AGEN DETEC AMPLI PROBE: HCPCS | Performed by: EMERGENCY MEDICINE

## 2021-01-01 PROCEDURE — 82746 ASSAY OF FOLIC ACID SERUM: CPT | Mod: ZL | Performed by: PHYSICIAN ASSISTANT

## 2021-01-01 PROCEDURE — 99285 EMERGENCY DEPT VISIT HI MDM: CPT | Performed by: EMERGENCY MEDICINE

## 2021-01-01 PROCEDURE — 83540 ASSAY OF IRON: CPT | Mod: ZL | Performed by: PHYSICIAN ASSISTANT

## 2021-01-01 PROCEDURE — 83550 IRON BINDING TEST: CPT | Performed by: INTERNAL MEDICINE

## 2021-01-01 PROCEDURE — 36415 COLL VENOUS BLD VENIPUNCTURE: CPT | Performed by: INTERNAL MEDICINE

## 2021-01-01 PROCEDURE — 99214 OFFICE O/P EST MOD 30 MIN: CPT | Performed by: PHYSICIAN ASSISTANT

## 2021-01-01 PROCEDURE — 83690 ASSAY OF LIPASE: CPT | Performed by: EMERGENCY MEDICINE

## 2021-01-01 PROCEDURE — 36415 COLL VENOUS BLD VENIPUNCTURE: CPT | Mod: ZL | Performed by: INTERNAL MEDICINE

## 2021-01-01 PROCEDURE — 70491 CT SOFT TISSUE NECK W/DYE: CPT

## 2021-01-01 PROCEDURE — 82607 VITAMIN B-12: CPT | Performed by: INTERNAL MEDICINE

## 2021-01-01 PROCEDURE — 80053 COMPREHEN METABOLIC PANEL: CPT | Mod: ZL | Performed by: INTERNAL MEDICINE

## 2021-01-01 PROCEDURE — 99284 EMERGENCY DEPT VISIT MOD MDM: CPT | Performed by: EMERGENCY MEDICINE

## 2021-01-01 PROCEDURE — 87205 SMEAR GRAM STAIN: CPT | Performed by: EMERGENCY MEDICINE

## 2021-01-01 PROCEDURE — 83550 IRON BINDING TEST: CPT | Mod: ZL | Performed by: INTERNAL MEDICINE

## 2021-01-01 PROCEDURE — 76705 ECHO EXAM OF ABDOMEN: CPT

## 2021-01-01 PROCEDURE — 85025 COMPLETE CBC W/AUTO DIFF WBC: CPT | Performed by: EMERGENCY MEDICINE

## 2021-01-01 PROCEDURE — 93010 ELECTROCARDIOGRAM REPORT: CPT | Performed by: INTERNAL MEDICINE

## 2021-01-01 PROCEDURE — 99442 PR PHYSICIAN TELEPHONE EVALUATION 11-20 MIN: CPT | Performed by: PHYSICIAN ASSISTANT

## 2021-01-01 PROCEDURE — 99213 OFFICE O/P EST LOW 20 MIN: CPT | Performed by: NURSE PRACTITIONER

## 2021-01-01 PROCEDURE — 85652 RBC SED RATE AUTOMATED: CPT | Mod: ZL | Performed by: INTERNAL MEDICINE

## 2021-01-01 PROCEDURE — 84165 PROTEIN E-PHORESIS SERUM: CPT | Mod: TC,ZL | Performed by: INTERNAL MEDICINE

## 2021-01-01 PROCEDURE — 83010 ASSAY OF HAPTOGLOBIN QUANT: CPT | Mod: ZL | Performed by: INTERNAL MEDICINE

## 2021-01-01 PROCEDURE — 85025 COMPLETE CBC W/AUTO DIFF WBC: CPT | Mod: ZL | Performed by: INTERNAL MEDICINE

## 2021-01-01 PROCEDURE — 250N000013 HC RX MED GY IP 250 OP 250 PS 637: Performed by: EMERGENCY MEDICINE

## 2021-01-01 PROCEDURE — 32555 ASPIRATE PLEURA W/ IMAGING: CPT

## 2021-01-01 RX ORDER — NICOTINE POLACRILEX 4 MG
15-30 LOZENGE BUCCAL
Status: CANCELLED | OUTPATIENT
Start: 2021-01-01

## 2021-01-01 RX ORDER — DEXTROSE MONOHYDRATE 25 G/50ML
25-50 INJECTION, SOLUTION INTRAVENOUS
Status: CANCELLED | OUTPATIENT
Start: 2021-01-01

## 2021-01-01 RX ORDER — NICOTINE POLACRILEX 4 MG
15-30 LOZENGE BUCCAL
Status: DISCONTINUED | OUTPATIENT
Start: 2021-01-01 | End: 2021-01-01 | Stop reason: HOSPADM

## 2021-01-01 RX ORDER — DEXTROSE MONOHYDRATE 25 G/50ML
25-50 INJECTION, SOLUTION INTRAVENOUS
Status: DISCONTINUED | OUTPATIENT
Start: 2021-01-01 | End: 2021-01-01

## 2021-01-01 RX ORDER — ALBUMIN (HUMAN) 12.5 G/50ML
12.5 SOLUTION INTRAVENOUS EVERY 5 MIN PRN
Status: CANCELLED | OUTPATIENT
Start: 2021-01-01

## 2021-01-01 RX ORDER — DEXTROSE MONOHYDRATE 25 G/50ML
25-50 INJECTION, SOLUTION INTRAVENOUS
Status: DISCONTINUED | OUTPATIENT
Start: 2021-01-01 | End: 2021-01-01 | Stop reason: HOSPADM

## 2021-01-01 RX ORDER — CLOPIDOGREL 300 MG/1
300 TABLET, FILM COATED ORAL ONCE
Status: COMPLETED | OUTPATIENT
Start: 2021-01-01 | End: 2021-01-01

## 2021-01-01 RX ORDER — LIDOCAINE HYDROCHLORIDE 10 MG/ML
10 INJECTION, SOLUTION EPIDURAL; INFILTRATION; INTRACAUDAL; PERINEURAL ONCE
Status: CANCELLED | OUTPATIENT
Start: 2021-01-01

## 2021-01-01 RX ORDER — TRAZODONE HYDROCHLORIDE 50 MG/1
50 TABLET, FILM COATED ORAL AT BEDTIME
Qty: 90 TABLET | Refills: 0 | Status: SHIPPED | OUTPATIENT
Start: 2021-01-01 | End: 2021-12-09

## 2021-01-01 RX ORDER — ALBUMIN (HUMAN) 12.5 G/50ML
12.5 SOLUTION INTRAVENOUS EVERY 5 MIN PRN
Status: DISCONTINUED | OUTPATIENT
Start: 2021-01-01 | End: 2021-01-01 | Stop reason: HOSPADM

## 2021-01-01 RX ORDER — LIDOCAINE 40 MG/G
CREAM TOPICAL
Status: CANCELLED | OUTPATIENT
Start: 2021-01-01

## 2021-01-01 RX ORDER — GADOBUTROL 604.72 MG/ML
7.5 INJECTION INTRAVENOUS ONCE
Status: COMPLETED | OUTPATIENT
Start: 2021-01-01 | End: 2021-01-01

## 2021-01-01 RX ORDER — LIDOCAINE 40 MG/G
CREAM TOPICAL
Status: DISCONTINUED | OUTPATIENT
Start: 2021-01-01 | End: 2021-01-01 | Stop reason: HOSPADM

## 2021-01-01 RX ORDER — NICOTINE POLACRILEX 4 MG
15-30 LOZENGE BUCCAL
Status: DISCONTINUED | OUTPATIENT
Start: 2021-01-01 | End: 2021-01-01

## 2021-01-01 RX ORDER — LIDOCAINE HYDROCHLORIDE 10 MG/ML
10-20 INJECTION, SOLUTION EPIDURAL; INFILTRATION; INTRACAUDAL; PERINEURAL ONCE
Status: CANCELLED | OUTPATIENT
Start: 2021-01-01

## 2021-01-01 RX ORDER — LIDOCAINE HYDROCHLORIDE 10 MG/ML
10-20 INJECTION, SOLUTION EPIDURAL; INFILTRATION; INTRACAUDAL; PERINEURAL ONCE
Status: COMPLETED | OUTPATIENT
Start: 2021-01-01 | End: 2021-01-01

## 2021-01-01 RX ORDER — LIDOCAINE HYDROCHLORIDE 10 MG/ML
10 INJECTION, SOLUTION EPIDURAL; INFILTRATION; INTRACAUDAL; PERINEURAL ONCE
Status: CANCELLED | OUTPATIENT
Start: 2021-01-01 | End: 2021-01-01

## 2021-01-01 RX ORDER — ALBUTEROL SULFATE 90 UG/1
AEROSOL, METERED RESPIRATORY (INHALATION)
Qty: 18 G | Refills: 1 | Status: SHIPPED | OUTPATIENT
Start: 2021-01-01

## 2021-01-01 RX ORDER — FOLIC ACID 1 MG/1
1 TABLET ORAL DAILY
Qty: 90 TABLET | Refills: 1 | Status: ON HOLD | OUTPATIENT
Start: 2021-01-01 | End: 2021-01-01

## 2021-01-01 RX ORDER — POTASSIUM CHLORIDE 1500 MG/1
20 TABLET, EXTENDED RELEASE ORAL DAILY
Qty: 30 TABLET | Refills: 1 | Status: ON HOLD | OUTPATIENT
Start: 2021-01-01 | End: 2021-01-01

## 2021-01-01 RX ORDER — IPRATROPIUM BROMIDE AND ALBUTEROL SULFATE 2.5; .5 MG/3ML; MG/3ML
3 SOLUTION RESPIRATORY (INHALATION) ONCE
Status: COMPLETED | OUTPATIENT
Start: 2021-01-01 | End: 2021-01-01

## 2021-01-01 RX ORDER — LIDOCAINE HYDROCHLORIDE 10 MG/ML
10 INJECTION, SOLUTION EPIDURAL; INFILTRATION; INTRACAUDAL; PERINEURAL ONCE
Status: COMPLETED | OUTPATIENT
Start: 2021-01-01 | End: 2021-01-01

## 2021-01-01 RX ORDER — VITAMIN A, VITAMIN C, VITAMIN D, VITAMIN E, THIAMINE, RIBOFLAVIN, NIACIN, VITAMIN B6, FOLIC ACID, VITAMIN B12, CALCIUM, IRON, ZINC, COPPER 4000; 120; 400; 22; 1.84; 3; 20; 10; 1; 12; 200; 27; 25; 2 [IU]/1; MG/1; [IU]/1; [IU]/1; MG/1; MG/1; MG/1; MG/1; MG/1; UG/1; MG/1; MG/1; MG/1; MG/1
TABLET ORAL
COMMUNITY
Start: 2021-01-01

## 2021-01-01 RX ORDER — LIDOCAINE HYDROCHLORIDE 10 MG/ML
10-20 INJECTION, SOLUTION EPIDURAL; INFILTRATION; INTRACAUDAL; PERINEURAL ONCE
Status: CANCELLED | OUTPATIENT
Start: 2021-01-01 | End: 2021-01-01

## 2021-01-01 RX ORDER — IOPAMIDOL 612 MG/ML
100 INJECTION, SOLUTION INTRAVASCULAR ONCE
Status: COMPLETED | OUTPATIENT
Start: 2021-01-01 | End: 2021-01-01

## 2021-01-01 RX ORDER — IOPAMIDOL 755 MG/ML
50 INJECTION, SOLUTION INTRAVASCULAR ONCE
Status: COMPLETED | OUTPATIENT
Start: 2021-01-01 | End: 2021-01-01

## 2021-01-01 RX ORDER — FUROSEMIDE 20 MG
60 TABLET ORAL DAILY
COMMUNITY
Start: 2021-01-01

## 2021-01-01 RX ORDER — LIDOCAINE HYDROCHLORIDE 10 MG/ML
INJECTION, SOLUTION EPIDURAL; INFILTRATION; INTRACAUDAL; PERINEURAL
Status: DISCONTINUED
Start: 2021-01-01 | End: 2021-01-01 | Stop reason: HOSPADM

## 2021-01-01 RX ORDER — IOPAMIDOL 612 MG/ML
50 INJECTION, SOLUTION INTRAVASCULAR ONCE
Status: COMPLETED | OUTPATIENT
Start: 2021-01-01 | End: 2021-01-01

## 2021-01-01 RX ORDER — LIDOCAINE HYDROCHLORIDE 10 MG/ML
10-20 INJECTION, SOLUTION EPIDURAL; INFILTRATION; INTRACAUDAL; PERINEURAL ONCE
Status: DISCONTINUED | OUTPATIENT
Start: 2021-01-01 | End: 2021-01-01 | Stop reason: HOSPADM

## 2021-01-01 RX ORDER — BUDESONIDE AND FORMOTEROL FUMARATE DIHYDRATE 160; 4.5 UG/1; UG/1
AEROSOL RESPIRATORY (INHALATION)
Qty: 10.2 G | Refills: 0 | Status: SHIPPED | OUTPATIENT
Start: 2021-01-01

## 2021-01-01 RX ORDER — PREDNISONE 20 MG/1
TABLET ORAL
Qty: 10 TABLET | Refills: 0 | Status: SHIPPED | OUTPATIENT
Start: 2021-01-01

## 2021-01-01 RX ORDER — SPIRONOLACTONE 50 MG/1
150 TABLET, FILM COATED ORAL DAILY
COMMUNITY
Start: 2021-01-01

## 2021-01-01 RX ORDER — POTASSIUM CHLORIDE 20MEQ/15ML
40 LIQUID (ML) ORAL ONCE
Status: COMPLETED | OUTPATIENT
Start: 2021-01-01 | End: 2021-01-01

## 2021-01-01 RX ORDER — FLUCONAZOLE 150 MG/1
TABLET ORAL
COMMUNITY
Start: 2020-03-16 | End: 2021-01-01

## 2021-01-01 RX ORDER — ASPIRIN 325 MG
325 TABLET ORAL ONCE
Status: COMPLETED | OUTPATIENT
Start: 2021-01-01 | End: 2021-01-01

## 2021-01-01 RX ORDER — DOXYCYCLINE HYCLATE 100 MG
100 TABLET ORAL 2 TIMES DAILY
Qty: 14 TABLET | Refills: 0 | Status: SHIPPED | OUTPATIENT
Start: 2021-01-01 | End: 2021-01-01

## 2021-01-01 RX ORDER — LIDOCAINE 40 MG/G
CREAM TOPICAL
Status: DISCONTINUED | OUTPATIENT
Start: 2021-01-01 | End: 2021-01-01

## 2021-01-01 RX ORDER — IPRATROPIUM BROMIDE AND ALBUTEROL SULFATE 2.5; .5 MG/3ML; MG/3ML
3 SOLUTION RESPIRATORY (INHALATION) 4 TIMES DAILY PRN
Qty: 360 ML | Refills: 11 | Status: SHIPPED | OUTPATIENT
Start: 2021-01-01

## 2021-01-01 RX ORDER — LIDOCAINE HYDROCHLORIDE 10 MG/ML
INJECTION, SOLUTION EPIDURAL; INFILTRATION; INTRACAUDAL; PERINEURAL
Status: COMPLETED
Start: 2021-01-01 | End: 2021-01-01

## 2021-01-01 RX ORDER — VITAMIN A ACETATE, BETA CAROTENE, ASCORBIC ACID, CHOLECALCIFEROL, .ALPHA.-TOCOPHEROL ACETATE, DL-, THIAMINE MONONITRATE, RIBOFLAVIN, NIACINAMIDE, PYRIDOXINE HYDROCHLORIDE, FOLIC ACID, CYANOCOBALAMIN, CALCIUM CARBONATE, FERROUS FUMARATE, ZINC OXIDE, CUPRIC OXIDE 3080; 12; 120; 400; 1; 1.84; 3; 20; 22; 920; 25; 200; 27; 10; 2 [IU]/1; UG/1; MG/1; [IU]/1; MG/1; MG/1; MG/1; MG/1; MG/1; [IU]/1; MG/1; MG/1; MG/1; MG/1; MG/1
1 TABLET, FILM COATED ORAL
COMMUNITY
Start: 2021-01-01 | End: 2021-12-24

## 2021-01-01 RX ORDER — POLYETHYLENE GLYCOL 3350, SODIUM CHLORIDE, SODIUM BICARBONATE, POTASSIUM CHLORIDE 420; 11.2; 5.72; 1.48 G/4L; G/4L; G/4L; G/4L
POWDER, FOR SOLUTION ORAL
COMMUNITY
Start: 2021-01-01

## 2021-01-01 RX ADMIN — IPRATROPIUM BROMIDE AND ALBUTEROL SULFATE 3 ML: .5; 3 SOLUTION RESPIRATORY (INHALATION) at 16:22

## 2021-01-01 RX ADMIN — SODIUM CHLORIDE 1000 ML: 9 INJECTION, SOLUTION INTRAVENOUS at 16:13

## 2021-01-01 RX ADMIN — LIDOCAINE HYDROCHLORIDE 6 ML: 10 INJECTION, SOLUTION EPIDURAL; INFILTRATION; INTRACAUDAL; PERINEURAL at 12:04

## 2021-01-01 RX ADMIN — POTASSIUM CHLORIDE 40 MEQ: 20 SOLUTION ORAL at 20:04

## 2021-01-01 RX ADMIN — LIDOCAINE HYDROCHLORIDE 6 ML: 10 INJECTION, SOLUTION EPIDURAL; INFILTRATION; INTRACAUDAL; PERINEURAL at 11:43

## 2021-01-01 RX ADMIN — LIDOCAINE HYDROCHLORIDE 8 ML: 10 INJECTION, SOLUTION EPIDURAL; INFILTRATION; INTRACAUDAL; PERINEURAL at 12:24

## 2021-01-01 RX ADMIN — LIDOCAINE HYDROCHLORIDE 8 ML: 10 INJECTION, SOLUTION EPIDURAL; INFILTRATION; INTRACAUDAL; PERINEURAL at 11:36

## 2021-01-01 RX ADMIN — IOPAMIDOL 100 ML: 612 INJECTION, SOLUTION INTRAVENOUS at 16:25

## 2021-01-01 RX ADMIN — LIDOCAINE HYDROCHLORIDE 6 ML: 10 INJECTION, SOLUTION EPIDURAL; INFILTRATION; INTRACAUDAL; PERINEURAL at 12:48

## 2021-01-01 RX ADMIN — LIDOCAINE HYDROCHLORIDE 5 ML: 10 INJECTION, SOLUTION EPIDURAL; INFILTRATION; INTRACAUDAL; PERINEURAL at 12:16

## 2021-01-01 RX ADMIN — LIDOCAINE HYDROCHLORIDE 3 ML: 10 INJECTION, SOLUTION EPIDURAL; INFILTRATION; INTRACAUDAL; PERINEURAL at 13:42

## 2021-01-01 RX ADMIN — CLOPIDOGREL BISULFATE 300 MG: 300 TABLET, FILM COATED ORAL at 22:26

## 2021-01-01 RX ADMIN — GADOBUTROL 7.5 ML: 604.72 INJECTION INTRAVENOUS at 16:53

## 2021-01-01 RX ADMIN — LIDOCAINE HYDROCHLORIDE 9 ML: 10 INJECTION, SOLUTION EPIDURAL; INFILTRATION; INTRACAUDAL; PERINEURAL at 12:03

## 2021-01-01 RX ADMIN — SODIUM CHLORIDE 500 ML: 9 INJECTION, SOLUTION INTRAVENOUS at 19:06

## 2021-01-01 RX ADMIN — LIDOCAINE HYDROCHLORIDE 9 ML: 10 INJECTION, SOLUTION EPIDURAL; INFILTRATION; INTRACAUDAL; PERINEURAL at 11:49

## 2021-01-01 RX ADMIN — IOPAMIDOL 100 ML: 612 INJECTION, SOLUTION INTRAVENOUS at 11:47

## 2021-01-01 RX ADMIN — IOPAMIDOL 50 ML: 612 INJECTION, SOLUTION INTRAVENOUS at 16:27

## 2021-01-01 RX ADMIN — ASPIRIN 325 MG ORAL TABLET 325 MG: 325 PILL ORAL at 20:04

## 2021-01-01 RX ADMIN — IOPAMIDOL 50 ML: 755 INJECTION, SOLUTION INTRAVENOUS at 18:20

## 2021-01-01 ASSESSMENT — MIFFLIN-ST. JEOR
SCORE: 1088.96
SCORE: 1187
SCORE: 1065
SCORE: 1114
SCORE: 1088.96

## 2021-01-01 ASSESSMENT — ANXIETY QUESTIONNAIRES
3. WORRYING TOO MUCH ABOUT DIFFERENT THINGS: NOT AT ALL
6. BECOMING EASILY ANNOYED OR IRRITABLE: SEVERAL DAYS
4. TROUBLE RELAXING: NOT AT ALL
IF YOU CHECKED OFF ANY PROBLEMS ON THIS QUESTIONNAIRE, HOW DIFFICULT HAVE THESE PROBLEMS MADE IT FOR YOU TO DO YOUR WORK, TAKE CARE OF THINGS AT HOME, OR GET ALONG WITH OTHER PEOPLE: NOT DIFFICULT AT ALL
GAD7 TOTAL SCORE: 1
7. FEELING AFRAID AS IF SOMETHING AWFUL MIGHT HAPPEN: NOT AT ALL
GAD7 TOTAL SCORE: 1
2. NOT BEING ABLE TO STOP OR CONTROL WORRYING: NOT AT ALL
1. FEELING NERVOUS, ANXIOUS, OR ON EDGE: NOT AT ALL
GAD7 TOTAL SCORE: 1
GAD7 TOTAL SCORE: 1
7. FEELING AFRAID AS IF SOMETHING AWFUL MIGHT HAPPEN: NOT AT ALL
1. FEELING NERVOUS, ANXIOUS, OR ON EDGE: NOT AT ALL
IF YOU CHECKED OFF ANY PROBLEMS ON THIS QUESTIONNAIRE, HOW DIFFICULT HAVE THESE PROBLEMS MADE IT FOR YOU TO DO YOUR WORK, TAKE CARE OF THINGS AT HOME, OR GET ALONG WITH OTHER PEOPLE: SOMEWHAT DIFFICULT
5. BEING SO RESTLESS THAT IT IS HARD TO SIT STILL: NOT AT ALL
6. BECOMING EASILY ANNOYED OR IRRITABLE: SEVERAL DAYS
3. WORRYING TOO MUCH ABOUT DIFFERENT THINGS: NOT AT ALL
2. NOT BEING ABLE TO STOP OR CONTROL WORRYING: NOT AT ALL
5. BEING SO RESTLESS THAT IT IS HARD TO SIT STILL: NOT AT ALL

## 2021-01-01 ASSESSMENT — PAIN SCALES - GENERAL
PAINLEVEL: NO PAIN (0)
PAINLEVEL: NO PAIN (0)
PAINLEVEL: SEVERE PAIN (7)
PAINLEVEL: SEVERE PAIN (7)
PAINLEVEL: NO PAIN (0)
PAINLEVEL: SEVERE PAIN (6)
PAINLEVEL: SEVERE PAIN (6)

## 2021-01-01 ASSESSMENT — PATIENT HEALTH QUESTIONNAIRE - PHQ9
SUM OF ALL RESPONSES TO PHQ QUESTIONS 1-9: 12
5. POOR APPETITE OR OVEREATING: NOT AT ALL
SUM OF ALL RESPONSES TO PHQ QUESTIONS 1-9: 6
SUM OF ALL RESPONSES TO PHQ QUESTIONS 1-9: 2

## 2021-01-01 ASSESSMENT — ASTHMA QUESTIONNAIRES: ACT_TOTALSCORE: 9

## 2021-01-01 ASSESSMENT — ENCOUNTER SYMPTOMS
APPETITE CHANGE: 0
COUGH: 1
SHORTNESS OF BREATH: 1
FEVER: 0
CHILLS: 0

## 2021-02-16 NOTE — TELEPHONE ENCOUNTER
"Pt called, reports ongoing epigastric area pain. No fever, no nausea, no diarrhea, no constipation. Pain is intermittent. Seems to be worse in the morning. Pt does have history of hospitalization in 4/2020 for similar issues. She notes that this pain is similar to what she was experiencing at that time. Pt reports that she is not currently taking carafate that was prescribed to her. Denies any jaundice. Scheduled for appt with PCP on 2/22 per pt's request.     Additional Information    Negative: Severe difficulty breathing (e.g., struggling for each breath, speaks in single words)    Negative: Shock suspected (e.g., cold/pale/clammy skin, too weak to stand, low BP, rapid pulse)    Negative: Difficult to awaken or acting confused (e.g., disoriented, slurred speech)    Negative: Passed out (i.e., lost consciousness, collapsed and was not responding)    Negative: Visible sweat on face or sweat dripping down face    Negative: Sounds like a life-threatening emergency to the triager    Negative: Followed an abdomen (stomach) injury    Negative: Chest pain    Negative: [1] SEVERE pain (e.g., excruciating) AND [2] present > 1 hour    Negative: [1] Pain lasts > 10 minutes AND [2] age > 50    Negative: [1] Pain lasts > 10 minutes AND [2] age > 40 AND [3] associated chest, arm, neck, upper back or jaw pain    Negative: [1] Pain lasts > 10 minutes AND [2] age > 35 AND [3] at least one cardiac risk factor (i.e., hypertension, diabetes, obesity, smoker or strong family history of heart disease)    Negative: [1] Pain lasts > 10 minutes AND [2] history of heart disease (i.e., heart attack, bypass surgery, angina, angioplasty, CHF; not just a heart murmur)    Negative: [1] Pain lasts > 10 minutes AND [2] difficulty breathing    Negative: [1] Vomiting AND [2] contains red blood  (Exception: few streaks and only occurred once)    Negative: [1] Vomiting AND [2] contains black (\"coffee ground\") material    Negative: Blood in bowel " "movements  (Exception: Blood on surface of BM with constipation)    Negative: Black or tarry bowel movements  (Exception: chronic-unchanged  black-grey bowel movements AND is taking iron pills or Pepto-bismol)    Negative: [1] Pregnant > 24 weeks AND [2] hand or face swelling    Negative: Patient sounds very sick or weak to the triager    Negative: [1] MILD-MODERATE pain AND [2] constant AND [3] present > 2 hours    Negative: [1] MILD-MODERATE pain AND [2] not relieved by antacids    Negative: [1] Vomiting AND [2] contains bile (green color)    Negative: [1] Vomiting AND [2] abdomen looks much more swollen than usual    Negative: White of the eyes have turned yellow (i.e., jaundice)    Negative: Fever > 103 F (39.4 C)    Negative: [1] Fever > 101 F (38.3 C) AND [2] age > 60    Negative: [1] Fever > 100.0 F (37.8 C) AND [2] bedridden (e.g., nursing home patient, CVA, chronic illness, recovering from surgery)    Negative: [1] Fever > 100.0 F (37.8 C) AND [2] diabetes mellitus or weak immune system (e.g., HIV positive, cancer chemo, splenectomy, organ transplant, chronic steroids)    Negative: [1] MODERATE pain (e.g., interferes with normal activities) AND [2] comes and goes (cramps) AND [3] present > 24 hours  (Exception: pain with Vomiting or Diarrhea - see that Guideline)    Negative: [1] MILD pain (e.g., does not interfere with normal activities) AND [2] comes and goes (cramps) AND [3] present > 72 hours    Alcohol abuse known or suspected    Answer Assessment - Initial Assessment Questions  1. LOCATION: \"Where does it hurt?\"       Upper middle abdomen  2. RADIATION: \"Does the pain shoot anywhere else?\" (e.g., chest, back)      no  3. ONSET: \"When did the pain begin?\" (e.g., minutes, hours or days ago)       Months ago  4. SUDDEN: \"Gradual or sudden onset?\"      sudden  5. PATTERN \"Does the pain come and go, or is it constant?\"     - If constant: \"Is it getting better, staying the same, or worsening?\"       (Note: " "Constant means the pain never goes away completely; most serious pain is constant and it progresses)      - If intermittent: \"How long does it last?\" \"Do you have pain now?\"      (Note: Intermittent means the pain goes away completely between bouts)      intermittent  6. SEVERITY: \"How bad is the pain?\"  (e.g., Scale 1-10; mild, moderate, or severe)     - MILD (1-3): doesn't interfere with normal activities, abdomen soft and not tender to touch      - MODERATE (4-7): interferes with normal activities or awakens from sleep, tender to touch      - SEVERE (8-10): excruciating pain, doubled over, unable to do any normal activities        7/10 at worst  7. RECURRENT SYMPTOM: \"Have you ever had this type of abdominal pain before?\" If so, ask: \"When was the last time?\" and \"What happened that time?\"       no  8. AGGRAVATING FACTORS: \"Does anything seem to cause this pain?\" (e.g., foods, stress, alcohol)      no  9. CARDIAC SYMPTOMS: \"Do you have any of the following symptoms: chest pain, difficulty breathing, sweating, nausea?\"      no  10. OTHER SYMPTOMS: \"Do you have any other symptoms?\" (e.g., fever, vomiting, diarrhea)        Vomiting at times, no fever, no diarrhea or constipation  11. PREGNANCY: \"Is there any chance you are pregnant?\" \"When was your last menstrual period?\"        no    Protocols used: ABDOMINAL PAIN - UPPER-A-AH      "

## 2021-02-22 NOTE — NURSING NOTE
"Chief Complaint   Patient presents with     Abdominal Pain       Initial /64   Pulse 116   Temp 98.6  F (37  C) (Tympanic)   Resp 16   Wt 57.6 kg (126 lb 14.4 oz)   SpO2 99%   BMI 21.78 kg/m   Estimated body mass index is 21.78 kg/m  as calculated from the following:    Height as of 4/4/20: 1.626 m (5' 4\").    Weight as of this encounter: 57.6 kg (126 lb 14.4 oz).  Medication Reconciliation: complete  Nidhi Forman LPN  "

## 2021-03-10 NOTE — NURSING NOTE
"Chief Complaint   Patient presents with     Consult For     NPT  Abdominal  Pain         Initial /60 (BP Location: Right arm, Patient Position: Sitting, Cuff Size: Adult Regular)   Pulse 113   Temp 98.7  F (37.1  C) (Tympanic)   Wt 59 kg (130 lb)   SpO2 99%   BMI 22.31 kg/m   Estimated body mass index is 22.31 kg/m  as calculated from the following:    Height as of 4/4/20: 1.626 m (5' 4\").    Weight as of this encounter: 59 kg (130 lb).  Medication Reconciliation: complete  Azul Shanks LPN  "

## 2021-03-10 NOTE — PROGRESS NOTES
CLINIC NOTE - CONSULT  3/10/2021    Patient:Nidhi Miguel  Referring Physician: Pam Acuña PA-C    Reason for Referral: Abdominal pain, anemia, vomiting, bloating    This is a 55 year old female with a need of an EGD and colonoscopy for Abdominal pain, anemia, vomiting, bloating.   Last colonoscopy : In  which showed proctitis and an EGD in  in which esophagitis is noted.   Patient does note regular alcohol use of a couple of drinks a day.      Past Medical History:  Past Medical History:   Diagnosis Date     Acquired absence of both cervix and uterus     No Comments Provided     Acute bronchospasm     possible asthma     Allergic rhinitis     2011     Cervicalgia     10/27/2011     Low back pain     Chronic low back pain (see narcotic contract).     Mild intermittent asthma, uncomplicated     No Comments Provided     Other psychoactive substance dependence, uncomplicated (H)     No Comments Provided     Personal history of other medical treatment (CODE)      1, para 1     Personal history of other medical treatment (CODE)     L4-5 with degenerative changes     Pneumonia due to other specified bacteria (H)     2012,Respiratory failure, vent support     Sepsis (H)     2012     Uncomplicated alcohol dependence (H)     2011       Past Surgical History:  Past Surgical History:   Procedure Laterality Date     FUSION CERVICAL ANTERIOR ONE LEVEL      2012,C3-7 posterior instrumented fusion iwth lateral mass screws and cancellous bone and progenix     LUMPECTOMY BREAST      ,Left, benign     OTHER SURGICAL HISTORY      ,[id number],COLPOSCOPY,Abnormal paps     OTHER SURGICAL HISTORY      ,[id number],OTHER,Right floating kidney     OTHER SURGICAL HISTORY      3/2010,[id number],CERVICAL DISKECTOMY,C5-6, subsequent osteomyelitis and deformity     OTHER SURGICAL HISTORY      2012,[id number],CERVICAL DISKECTOMY,C5-6 redo and C4-7 arthrodesis and graft and anterior placement of plate.      UPPER GI ENDOSCOPY N/A 02/08/2020       Family History History:  Family History   Problem Relation Age of Onset     Coronary Artery Disease Father      Hypertension Father         Hypertension     Prostate Cancer Father      Diabetes No family hx of      Colon Cancer No family hx of      Breast Cancer No family hx of      Other Cancer No family hx of      Substance Abuse Father         Alcohol/Drug,alcoholic but quit     Substance Abuse Paternal Grandfather         Alcohol/Drug,emphysema chemically induced..     Hypertension Paternal Grandfather         Hypertension     Cancer Maternal Grandmother         Cancer,Pancreatic cancer       History of Tobacco Use:  History   Smoking Status     Current Every Day Smoker     Packs/day: 0.30     Years: 30.00     Types: Cigarettes     Start date: 1/1/1980   Smokeless Tobacco     Never Used     Comment: denies quit plan 3/10/21       Current Medications:  Current Outpatient Medications   Medication Sig Dispense Refill     omeprazole (PRILOSEC) 20 MG DR capsule Take 1 capsule (20 mg) by mouth daily 30 capsule 3     potassium chloride ER (K-TAB) 20 MEQ CR tablet Take 1 tablet (20 mEq) by mouth daily 30 tablet 1     albuterol (PROAIR HFA/PROVENTIL HFA/VENTOLIN HFA) 108 (90 Base) MCG/ACT inhaler Inhale 2 puffs into the lungs every 4 hours as needed (Patient not taking: Reported on 3/10/2021) 1 Inhaler 11     albuterol (PROVENTIL) (2.5 MG/3ML) 0.083% neb solution Inhale 2.5 mg into the lungs 4 times daily as needed       BUDESONIDE-FORMOTEROL 160-4.5 MCG/ACT IN Inhaler INHALE 2 PUFFS BY MOUTH TWICE DAILY (Patient not taking: Reported on 3/10/2021) 11 g 3     ipratropium - albuterol 0.5 mg/2.5 mg/3 mL (DUONEB) 0.5-2.5 (3) MG/3ML neb solution Inhale 1 vial (3 mLs) into the lungs 4 times daily as needed (Patient not taking: Reported on 3/10/2021) 360 mL 11       Allergies:  Allergies   Allergen Reactions     Acetaminophen-Codeine Hives     Codeine Hives     Tylenol 3        ROS:  Pertinent items are noted in HPI.    PHYSICAL EXAM:     Vital signs: /60 (BP Location: Right arm, Patient Position: Sitting, Cuff Size: Adult Regular)   Pulse 113   Temp 98.7  F (37.1  C) (Tympanic)   Wt 59 kg (130 lb)   SpO2 99%   BMI 22.31 kg/m     BMI: Body mass index is 22.31 kg/m .   General: alert   Skin: ashen   Lungs: clear to auscultation   CV: Regular rate and rhythm   Abdominal: significant distension noted   Extremities: No cyanosis, clubbing or edema noted bilaterally in Upper and Lower Extremities   Neurological: without deficit    ASSESSMENT:      55 year old female with a need of an EGD and colonoscopy for Abdominal pain, anemia, vomiting, bloating.    PLAN:  Patient's labs were reviewed and it was noted the patient has an elevated bilirubin.  It appears at this appointment that the patient is in Paige Class C Liver Failure.  Patient case was discussed with Dr. Lee who contacted the patient's PCP.  Patient will need a work up of her liver and a pre-operative physical to have an EGD and colonoscopy done.  Once the patient is medically cleared, an EGD and colonoscopy is recommended to the patient.  The procedure with their risks, benefits and alternatives were explained.  Risks include but are not limited to bleeding, perforation, missing lesions, need for additional procedures, reaction to anesthesia.  All the patients questions were answered.  The patient consents to proceed as planned.

## 2021-03-11 NOTE — TELEPHONE ENCOUNTER
"Called patient  3/10/21 & 3/11/21 LM to schedule  appt with WILIAM Johnson  \" Abdominal pain \"  Per WILIAM Johnson    "

## 2021-03-12 NOTE — PROGRESS NOTES
Assessment & Plan     (R10.84) Abdominal pain, generalized  (primary encounter diagnosis)  Comment: 4 month history of abdominal pain and nausea. Now increased abdominal pain and bloating. She has decreased her alcohol content to 2 drinks daily, otherwise withdrawal symptoms. Dr. Lopez also examines today. Dr. Lee declined to do EMG until further work up complete. Repeat labs today and schedule abdominal/pelvic CT.  Plan: CT Abdomen Pelvis w/o & w Contrast            (D53.9) Macrocytic anemia  Comment: Hg 8.2, down from 8.5 3 weeks ago. Will see what CT shows. Will likely need hematology and GI consult.  Plan: Comprehensive metabolic panel, Blood Morphology        Pathologist Review, CBC with platelets         differential, Reticulocyte Count, Vitamin B12,         Folate                   Tobacco Cessation:   reports that she has been smoking cigarettes. She started smoking about 41 years ago. She has a 9.00 pack-year smoking history. She has never used smokeless tobacco.          No follow-ups on file.    WILIAM Muller  [facility]    Macy Jaimes is a 55 year old who presents for the following health issues     HPI       Abdominal Pain Follow up  Onset/Duration:  2 months ago (for the 2nd time)  Description:   Character: Fullness  Location: epigastric region left upper quadrant left lower quadrant  Radiation: None  Intensity: moderate  Progression of Symptoms:  worsening and constant  Accompanying Signs & Symptoms:  Fever/Chills: no  Gas/Bloating: YES  Nausea: YES  Vomitting: no  Diarrhea: YES  Constipation: no  Dysuria or Hematuria: no  History:   Trauma: no  Previous similar pain: YES- a year ago  Previous tests done: CT and Upper Endoscopy  Precipitating factors:   Does the pain change with:     Food: YES    Bowel Movement: no    Urination: no   Other factors:  no  Therapies tried and outcome: None  No LMP recorded. Patient is postmenopausal.          Review of  Systems   Constitutional, HEENT, cardiovascular, pulmonary, gi and gu systems are negative, except as otherwise noted.      Objective    /60   Pulse 125   Temp 98.5  F (36.9  C) (Tympanic)   Resp 16   Wt 59.3 kg (130 lb 12.8 oz)   SpO2 99%   BMI 22.45 kg/m    Body mass index is 22.45 kg/m .  Physical Exam   Gen:Appears well, in no apparent distress.   Resp: Lungs CTA without wheeze, rales, rhonchi  Card: RRR  Abd:Round, distended, firm. Normal bowel sounds. Diffuse TTP.

## 2021-03-15 NOTE — NURSING NOTE
"Chief Complaint   Patient presents with     Abdominal Pain     follow up       Initial /60   Pulse 125   Temp 98.5  F (36.9  C) (Tympanic)   Resp 16   Wt 59.3 kg (130 lb 12.8 oz)   SpO2 99%   BMI 22.45 kg/m   Estimated body mass index is 22.45 kg/m  as calculated from the following:    Height as of 4/4/20: 1.626 m (5' 4\").    Weight as of this encounter: 59.3 kg (130 lb 12.8 oz).  Medication Reconciliation: complete  Nidhi Forman LPN  "

## 2021-03-16 NOTE — TELEPHONE ENCOUNTER
Patient advised to take potassium everyday this week on a full stomach and come back in for a lab only appointment to have potassium rechecked.  Patient verbalized understanding.  Lab order for potassium is pending your review and approval.

## 2021-03-16 NOTE — TELEPHONE ENCOUNTER
DATE:  3/16/2021   TIME OF RECEIPT FROM LAB:  1029  LAB TEST:  potassium  LAB VALUE:  2.8  RESULTS GIVEN WITH READ-BACK TO (PROVIDER):  ELSA FARNSWORTH  TIME LAB VALUE REPORTED TO PROVIDER:   1030  Ivis France LPN

## 2021-03-19 NOTE — TELEPHONE ENCOUNTER
BUDESONIDE-FORMOTEROL 160-4.5 MCG/ACT IN Inhaler     Last Written Prescription Date:  2/21/20  Last Fill Quantity: 11 g,   # refills: 3  Last Office Visit: 3/15/21  Future Office visit:       Routing refill request to provider for review/approval

## 2021-03-22 NOTE — PROGRESS NOTES
Visit Date:   03/22/2021      REASON FOR CONSULTATION:  Microcytic anemia.      REQUESTING PHYSICIAN:  Dr. Lopez as well as WILIAM Johnson.      HISTORY OF PRESENT ILLNESS:  Ms. Nowak is a 55-year-old white female with multiple medical problems including history of alcohol abuse, alcoholic hepatitis, asthma, history of CVA.  We are asked to evaluate concerning macrocytic anemia.  Apparently, she had recently been seen by Pam Acuña who ordered a CBC which revealed a white count of 12.8, H and H 8.2 and 25.3, platelet count is 252, MCV is 125.  Peripheral blood smear was ordered and was read as macrocytic anemia with increased anisopoikilocytosis, folate deficiency, toxic neutrophilia.  The patient's folate level was low.  There was a mild reticulocytosis.  Folate level came back low at 1.7.  Vitamin B12 was 370.  She also ordered a CT of the abdomen and pelvis on 03/18 and the findings were that there was moderate ascites.  There was fatty infiltration of the liver.  The patient was a heavy drinker in the past and now has cut back to 2 beers a day.  She said she drank hard liquor in the past.  She is also a heavy smoker.  She smoked at least a pack per day for 40 years.  She has cut back to one-third of a pack.  The major concern is related to abdominal distention, cough.  She says her appetite is poor but she has not been eating well to what she describes as an upset stomach.  She denies any fevers or night sweats.  She does complain of reflux type symptoms.      PAST MEDICAL HISTORY:  As above, history of alcohol abuse, epigastric pain, alcoholic hepatitis, hypokalemia, hypomagnesemia, macrocytic anemia, thrombocytopenia, acute colitis, hematemesis with nausea, essential hypertension, major depressive disorder, intermittent asthma, chemical dependency, left middle cerebral artery stroke, history of CVA in adulthood, moderate alcohol dependence in early remission, alcohol-induced pancreatitis, chronic  neck pain, status post cervical spinal arthrodesis, pseudoarthrosis of the cervical spine, alcohol dependence with intoxication, tobacco use disorder, latent tuberculosis infection, lumbago.      ALLERGIES:  TYLENOL WITH CODEINE.      MEDICATIONS:   1.  Folic acid 1 mg daily.   2.  Symbicort inhaler 2 puffs twice daily.   3.  Prilosec 20 mg daily.   4.  Potassium chloride 20 mEq daily.   5.  Albuterol 2 puffs into lungs every 4 hours.     6.  DuoNeb nebulizers p.r.n.   7.  Albuterol nebulizers p.r.n.      SOCIAL HISTORY:  She is a 40-pack-year smoker.  She has cut back to a third pack per day.  Alcohol:  She was a heavy drinker, drinks about 2 beers a day.  She is a retired LPN.  She takes care of her boyfriend who has a recent CVA.      FAMILY HISTORY:  Significant for father with prostate cancer, grandmother with pancreatic cancer.      REVIEW OF SYSTEMS:   CENTRAL NERVOUS SYSTEM:  Negative for headaches, change in mental status.   ENT:  Negative for hearing loss.   RESPIRATORY:  No cough, no dyspnea on exertion.  No hemoptysis.   CARDIAC:  Negative chest pain, palpitations, orthopnea, PND, ankle edema.   GASTROINTESTINAL:  Significant for reflux-type symptoms, nausea.  No bright red blood per rectum, hematemesis.   MUSCULOSKELETAL:  Unremarkable.   GENITOURINARY:  Negative for nocturia, urgency, frequency, hematuria.   CONSTITUTIONAL:  Negative fevers, night sweats, weight loss.   HEMATOLOGIC:  Negative for easy bruisability, gingival bleeding, epistaxis.      PHYSICAL EXAMINATION:   GENERAL:  She is a frail, middle-aged white female in no acute distress.     ECOG performance status of 1.   VITAL SIGNS:  Blood pressure 100/60, pulse 115, respirations 20, temperature 99.6.   HEENT:  Atraumatic, normocephalic.  Oropharynx reveals dry oral mucosa.   NECK:  Supple.   LUNGS:  Clear to auscultation and percussion.   HEART:  Regular rhythm, S1 is normal.   ABDOMEN:  Distended, mild tenderness over the epigastrium, no  rebound.   LYMPHATICS:  No cervical, supraclavicular, axillary or inguinal nodes.   EXTREMITIES:  No edema.   NEUROLOGIC:  Nonfocal.      LABORATORY DATA:  Pending.      IMPRESSION:  Macrocytic anemia, folate deficiency, alcohol abuse, ascites.  We would like to rule out other causes of anemia.  We will obtain a serum protein electrophoresis, sed rate, rheumatoid factor, TRACI, haptoglobin, retic count.  Given her abdominal distention we would like to rule out occult malignancy by obtaining an MRI of the abdomen with attention to the liver.  Also, CT chest, given her history of tobacco abuse to rule out occult malignancy, CT neck.  The patient is not clinically responding to folic acid.  We would recommend an EGD as she has symptoms of reflux in the setting of history of alcohol abuse.  Eighty minutes was spent with this patient.  The time was spent reviewing previous records and notes, physician provider notes, lab work, imaging studies, performing history and physical and documenting history and physical, ordering lab work, scans and followup.         OTONIEL SANCHEZ MD             D: 2021   T: 2021   MT: MEÑO      Name:     SAUL MCODNALD   MRN:      [id number]        Account:      [id number]   :      1965           Visit Date:   2021      Document: [id number]       cc: Giovanni SWAIN

## 2021-03-22 NOTE — NURSING NOTE
"Chief Complaint   Patient presents with     Consult     Consult Macrocytic Anemia and history of GI bleed referred by Dr Lopez        Initial /60   Pulse 115   Temp 99.6  F (37.6  C) (Tympanic)   Resp 20   Ht 1.626 m (5' 4\")   Wt 59.7 kg (131 lb 9.8 oz)   SpO2 100%   BMI 22.59 kg/m   Estimated body mass index is 22.59 kg/m  as calculated from the following:    Height as of this encounter: 1.626 m (5' 4\").    Weight as of this encounter: 59.7 kg (131 lb 9.8 oz).  Medication Reconciliation: complete.  Immunizations and advance directives status reviewed. Pain scale =7 abdominal and pelvic , PHQ-9 =2.    Patient was assessed using the NCCN psychosocial distress thermometer. Patient rated the score as a 6. Patient rated current stressors as per scanned form. Stressors will be brought to the attention of provider or Oncology RN Care Coordinator for a score of 6 or greater or per nurses discretion.                   Kisha Brice LPN    "

## 2021-03-22 NOTE — PATIENT INSTRUCTIONS
We would like to see you back in 3 weeks after MRI and CT of chest and neck. When you are in need of a refill of your medications, please call your pharmacy and they will send us the request. If you have any questions please call [phone]

## 2021-04-05 NOTE — ED PROVIDER NOTES
History     Chief Complaint   Patient presents with     Bloated     Abdominal Pain     HPI  Nidhi Miguel is a 55 year old female who presents as abdomen is full, never been tapped previously, currently biggest it has ever been, no fever, no chills, pain is due to fullness, not specific spot.  No cp or sob.  No other associated symptoms.  Started about two months ago.  Last month it is worse and worse.  Duration ongoing, character persistent each day.      Allergies:  Allergies   Allergen Reactions     Acetaminophen-Codeine Hives     Codeine Hives     Tylenol 3       Problem List:    Patient Active Problem List    Diagnosis Date Noted     Alcohol abuse 04/04/2020     Priority: Medium     Epigastric pain 04/04/2020     Priority: Medium     Dehydration 04/04/2020     Priority: Medium     Hematemesis 04/04/2020     Priority: Medium     Alcoholic hepatitis without ascites 02/09/2020     Priority: Medium     Hypokalemia 02/09/2020     Priority: Medium     Hypomagnesemia 02/09/2020     Priority: Medium     Macrocytic anemia 02/09/2020     Priority: Medium     Thrombocytopenia (H) 02/09/2020     Priority: Medium     Acute colitis 02/07/2020     Priority: Medium     Hematemesis with nausea 02/07/2020     Priority: Medium     Essential hypertension 06/20/2019     Priority: Medium     Major depressive disorder, recurrent, moderate (H) 06/20/2019     Priority: Medium     Asthma, intermittent 02/06/2018     Priority: Medium     Chemical dependency (H) 02/06/2018     Priority: Medium     Overview:   10/21/12, per d/w Dr Bell, pt has abused etoh for years, hx of meth/marij/cocaine abuse, at [facility] fac approx one year       Left middle cerebral artery stroke (H) 07/29/2016     Priority: Medium     History of cerebrovascular accident (CVA) in adulthood 07/29/2016     Priority: Medium     Left CVA       Moderate alcohol dependence in early remission (H) 07/29/2016     Priority: Medium     Alcohol-induced pancreatitis  12/12/2014     Priority: Medium     Chronic neck pain 04/05/2013     Priority: Medium     Status post cervical spinal arthrodesis 03/29/2012     Priority: Medium     Overview:   IMO Update 10/11       Pseudoarthrosis of cervical spine (H) 02/02/2012     Priority: Medium     Alcohol dependence with intoxication (H) 09/13/2011     Priority: Medium     Overview:        Tobacco use disorder 03/12/2010     Priority: Medium     LTBI (latent tuberculosis infection) 03/03/2010     Priority: Medium     Overview:   Positive skin test 2002       Lumbago 11/15/2006     Priority: Medium     Overview:   IMO Update 10/11          Past Medical History:    Past Medical History:   Diagnosis Date     Acquired absence of both cervix and uterus      Acute bronchospasm      Allergic rhinitis      Cervicalgia      COPD (chronic obstructive pulmonary disease) (H)      Hypertension      Low back pain      Mild intermittent asthma, uncomplicated      Other psychoactive substance dependence, uncomplicated (H)      Personal history of other medical treatment (CODE)      Personal history of other medical treatment (CODE)      Pneumonia due to other specified bacteria (H)      Sepsis (H)      Uncomplicated alcohol dependence (H)        Past Surgical History:    Past Surgical History:   Procedure Laterality Date     FUSION CERVICAL ANTERIOR ONE LEVEL      2/2012,C3-7 posterior instrumented fusion iwth lateral mass screws and cancellous bone and progenix     LUMPECTOMY BREAST      1998,Left, benign     OTHER SURGICAL HISTORY      1990s,[id number],COLPOSCOPY,Abnormal paps     OTHER SURGICAL HISTORY      2000,[id number],OTHER,Right floating kidney     OTHER SURGICAL HISTORY      3/2010,[id number],CERVICAL DISKECTOMY,C5-6, subsequent osteomyelitis and deformity     OTHER SURGICAL HISTORY      2/2012,[id number],CERVICAL DISKECTOMY,C5-6 redo and C4-7 arthrodesis and graft and anterior placement of plate.     UPPER GI ENDOSCOPY N/A 02/08/2020       Family History:     Family History   Problem Relation Age of Onset     Coronary Artery Disease Father      Hypertension Father         Hypertension     Prostate Cancer Father      Substance Abuse Father         Alcohol/Drug,alcoholic but quit     Cancer Maternal Grandmother         Cancer,Pancreatic cancer     Substance Abuse Paternal Grandfather         Alcohol/Drug,emphysema chemically induced..     Hypertension Paternal Grandfather         Hypertension     Hyperlipidemia Mother      Diabetes No family hx of      Colon Cancer No family hx of      Breast Cancer No family hx of      Other Cancer No family hx of      Cerebrovascular Disease No family hx of      Thyroid Disease No family hx of      Genetic Disorder No family hx of      Asthma No family hx of      Anesthesia Reaction No family hx of        Social History:  Marital Status:  Single [1]  Social History     Tobacco Use     Smoking status: Current Every Day Smoker     Packs/day: 0.30     Years: 30.00     Pack years: 9.00     Types: Cigarettes     Start date: 1/1/1980     Smokeless tobacco: Never Used     Tobacco comment: denies quit plan 3/10/21   Substance Use Topics     Alcohol use: Yes     Alcohol/week: 14.0 standard drinks     Comment: today, vodka straight     Drug use: No     Types: Other     Comment: Drug use: NoHistory of methamphetamine use        Medications:    albuterol (PROAIR HFA/PROVENTIL HFA/VENTOLIN HFA) 108 (90 Base) MCG/ACT inhaler  albuterol (PROVENTIL) (2.5 MG/3ML) 0.083% neb solution  folic acid (FOLVITE) 1 MG tablet  ipratropium - albuterol 0.5 mg/2.5 mg/3 mL (DUONEB) 0.5-2.5 (3) MG/3ML neb solution  omeprazole (PRILOSEC) 20 MG DR capsule  potassium chloride ER (K-TAB) 20 MEQ CR tablet  SYMBICORT 160-4.5 MCG/ACT Inhaler          Review of Systems  resp denies, no cp, GI per hpi, gu denies.  Const denies.  Remainder of complete 10 pt ros negative.     Physical Exam   BP: 118/78  Pulse: 110  Temp: 99.8  F (37.7  C)  Resp: 16  SpO2: 96 %      Physical  Exam  Constitutional:       Appearance: Normal appearance.   HENT:      Head: Normocephalic and atraumatic.      Nose: Nose normal. No congestion.      Mouth/Throat:      Mouth: Mucous membranes are moist.   Eyes:      Extraocular Movements: Extraocular movements intact.      Conjunctiva/sclera: Conjunctivae normal.      Pupils: Pupils are equal, round, and reactive to light.   Neck:      Musculoskeletal: Normal range of motion and neck supple.   Cardiovascular:      Rate and Rhythm: Normal rate.      Pulses: Normal pulses.   Pulmonary:      Effort: Pulmonary effort is normal. No respiratory distress.      Breath sounds: No wheezing.   Abdominal:      Comments: Abdomen distension with fluid wave, not tense, no focality, no peritoneal signs   Musculoskeletal: Normal range of motion.         General: No swelling.   Skin:     General: Skin is warm and dry.      Capillary Refill: Capillary refill takes less than 2 seconds.   Neurological:      General: No focal deficit present.      Mental Status: She is alert and oriented to person, place, and time.   Psychiatric:         Mood and Affect: Mood normal.       Bedside US: Ascites about abdomen.          Results for orders placed or performed during the hospital encounter of 04/05/21 (from the past 24 hour(s))   Lipase   Result Value Ref Range    Lipase 190 73 - 393 U/L   Comprehensive metabolic panel   Result Value Ref Range    Sodium 137 133 - 144 mmol/L    Potassium 3.4 3.4 - 5.3 mmol/L    Chloride 106 94 - 109 mmol/L    Carbon Dioxide 21 20 - 32 mmol/L    Anion Gap 10 3 - 14 mmol/L    Glucose 91 70 - 99 mg/dL    Urea Nitrogen 7 7 - 30 mg/dL    Creatinine 0.54 0.52 - 1.04 mg/dL    GFR Estimate >90 >60 mL/min/[1.73_m2]    GFR Estimate If Black >90 >60 mL/min/[1.73_m2]    Calcium 8.9 8.5 - 10.1 mg/dL    Bilirubin Total 5.2 (H) 0.2 - 1.3 mg/dL    Albumin 1.9 (L) 3.4 - 5.0 g/dL    Protein Total 6.7 (L) 6.8 - 8.8 g/dL    Alkaline Phosphatase 198 (H) 40 - 150 U/L    ALT 19 0  - 50 U/L     (H) 0 - 45 U/L   CBC with platelets differential   Result Value Ref Range    WBC 12.5 (H) 4.0 - 11.0 10e9/L    RBC Count 2.22 (L) 3.8 - 5.2 10e12/L    Hemoglobin 8.5 (L) 11.7 - 15.7 g/dL    Hematocrit 27.3 (L) 35.0 - 47.0 %     (H) 78 - 100 fl    MCH 38.3 (H) 26.5 - 33.0 pg    MCHC 31.1 (L) 31.5 - 36.5 g/dL    RDW 14.1 10.0 - 15.0 %    Platelet Count 258 150 - 450 10e9/L    Diff Method Automated Method     % Neutrophils 80.8 %    % Lymphocytes 7.3 %    % Monocytes 10.1 %    % Eosinophils 0.7 %    % Basophils 0.7 %    % Immature Granulocytes 0.4 %    Nucleated RBCs 0 0 /100    Absolute Neutrophil 10.1 (H) 1.6 - 8.3 10e9/L    Absolute Lymphocytes 0.9 0.8 - 5.3 10e9/L    Absolute Monocytes 1.3 0.0 - 1.3 10e9/L    Absolute Eosinophils 0.1 0.0 - 0.7 10e9/L    Absolute Basophils 0.1 0.0 - 0.2 10e9/L    Abs Immature Granulocytes 0.1 0 - 0.4 10e9/L    Absolute Nucleated RBC 0.0    INR   Result Value Ref Range    INR 1.37 (H) 0.86 - 1.14       Medications - No data to display    Assessments & Plan (with Medical Decision Making)   Patient with persistent ascites, increasing, quit alcohol one month ago, no focality on exam, no fever or chills, exam not peritoneal.  Plan for discharge to home to followup pcp tomorrow as abd not tense, diagnostic tap not indicated at this time and therapeutic tap may be indicated in days to come though not seemingly necessary at this time given the patients exam and relative comfort, lack of tachypnea.  Risks and concerns with frequent taps discussed and plan to followup tomorrow in [facility] and perhaps paracentesis in the coming week if progression and needed.  Repeat exam nonfocal without suggestion of other acute pathology      New Prescriptions    No medications on file       Final diagnoses:   Alcoholic cirrhosis of liver with ascites (H)       4/5/2021   [facility]     Humble Chew MD  04/05/21 0025

## 2021-04-05 NOTE — TELEPHONE ENCOUNTER
"  Additional Information    Negative: Shock suspected (e.g., cold/pale/clammy skin, too weak to stand, low BP, rapid pulse)    Negative: Difficult to awaken or acting confused (e.g., disoriented, slurred speech)    Negative: Passed out (i.e., lost consciousness, collapsed and was not responding)    Negative: Sounds like a life-threatening emergency to the triager    Negative: Chest pain    Negative: Pain is mainly in upper abdomen  (if needed ask: \"is it mainly above the belly button?\")    Negative: Followed an abdomen (stomach) injury    Negative: [1] Abdominal pain AND [2] pregnant < 20 weeks    Negative: [1] Abdominal pain AND [2] pregnant > 20 weeks    Negative: [1] Abdominal pain AND [2] postpartum (from 0 to 6 weeks after delivery)    Negative: [1] SEVERE pain (e.g., excruciating) AND [2] present > 1 hour    Negative: [1] SEVERE pain AND [2] age > 60    Negative: [1] Vomiting AND [2] contains red blood or black (\"coffee ground\") material  (Exception: few red streaks in vomit that only happened once)    Negative: Blood in bowel movements   (Exception: blood on surface of BM with constipation)    Negative: Black or tarry bowel movements  (Exception: chronic-unchanged  black-grey bowel movements AND is taking iron pills or Pepto-bismol)    Negative: Patient sounds very sick or weak to the triager    [1] MILD-MODERATE pain AND [2] constant AND [3] present > 2 hours    Protocols used: ABDOMINAL PAIN - FEMALE-A-AH    "

## 2021-04-05 NOTE — ED NOTES
Discharge instructions completed with patient with no questions or concerns. Appointment made with PCP for tomorrow morning. Patient will return with worsening or any concerns.

## 2021-04-05 NOTE — ED TRIAGE NOTES
Present with ascites.  Pt reports this has never happened before.  Pt denies swelling of the legs but they feel weak.  reprotrs abdominal pain.  Pain started a few months ago and has been getting worse.  Pt has been seen for this issue and told her to just keep an eye on it. Pt jarred appears jaundiced in triage pt also slightly pale

## 2021-04-05 NOTE — TELEPHONE ENCOUNTER
"Pt called, reports worsening abdominal \"bloating\" and pain. Upon chart review, pt does have notable history of alcoholic hepatitis and ascites. Pt was referred to gastroenterology but states that she doesn't have an appt until 4/19. Pt was advised to be seen in ER. Pt verbalizes understanding.   "

## 2021-04-06 NOTE — PROGRESS NOTES
Fiona is a 55 year old who is being evaluated via a billable telephone visit.      What phone number would you like to be contacted at? [phone]  How would you like to obtain your AVS? Pt don't need     Assessment & Plan     (R18.8) Other ascites  (primary encounter diagnosis)  Comment: Patient has increasing ascites with associated SOB with any activity such as simply walking in her home. She denies chest pain. She has upcoming CT's and MRI on 4-12. She has GI consult on 4-19. She went to ER yesterday to to increasing abdominal discomfort and they did not think she needed paracentesis yet. Patient continues to be very uncomfortable today.I have advised close monitoring and return to ER within the next few days if symptoms persist or worsen or any new symptoms.                   Tobacco Cessation:   reports that she has been smoking cigarettes. She started smoking about 41 years ago. She has a 9.00 pack-year smoking history. She has never used smokeless tobacco.          No follow-ups on file.    WILIAM Muller  [facility]   Fiona is a 55 year old who presents for the following health issues     HPI     ED/UC Followup:    Facility:  [facility]  Date of visit: 04/05/21  Reason for visit: Bloated, abdominal pain (Alcoholic cirrhosis of liver with ascites (H))  Current Status: Patient states feeling the same           Review of Systems   Constitutional, HEENT, cardiovascular, pulmonary, gi and gu systems are negative, except as otherwise noted.      Objective           Vitals:  No vitals were obtained today due to virtual visit.    Physical Exam   alert and mild distress  PSYCH: Alert and oriented times 3; coherent speech, normal   rate and volume, able to articulate logical thoughts, able   to abstract reason, no tangential thoughts, no hallucinations   or delusions  Her affect is normal  RESP: No cough, no audible wheezing, able to talk in full sentences  Remainder of  exam unable to be completed due to telephone visits                Phone call duration: 14 minutes

## 2021-04-06 NOTE — NURSING NOTE
"Chief Complaint   Patient presents with     ER F/U     4/5/21 Bloated, abdominal pain       Initial There were no vitals taken for this visit. Estimated body mass index is 22.59 kg/m  as calculated from the following:    Height as of 3/22/21: 1.626 m (5' 4\").    Weight as of 3/22/21: 59.7 kg (131 lb 9.8 oz).  Medication Reconciliation: complete  Nidhi Forman LPN  "

## 2021-04-13 NOTE — TELEPHONE ENCOUNTER
Clinic Care Coordination Contact  Care Team Conversations    Patient attended SO follow up appointment. She was scheduled for scans for Dr. Hernández and she also has an appointment with her GI MD up coming. She had to cancel her appointment for scans d/t not having the transportation to get there. She states that she contacted her insurance but they are booked out to far. She is willing to speak with SW to see if there may be another option for her.  Linda Mckenzie RN Oncology Care Coordinator

## 2021-04-15 NOTE — TELEPHONE ENCOUNTER
Care Transitions focused note:      Called patient and discussed transportation issues.  [facility] has had to cancel some appointments as there were no volunteer drivers available to take her to the appointments.  Per Carlos Parker, voulnteer  coordinator, she said they do not have enough volunteers and she is scheduling 2-3 weeks out and can not accommodate faster turn around appointments.      Spoke to [facility] and they gave this SW [employer] contracted with [facility] for services:  [employer] [phone], [employer] [phone] or [employer] [phone].  Patient can call and schedule with those companies herself.  Will relay this to patient in a letter with company names and telephone numbers.      Spoke briefly about home care services for herself and her SO.  She declined and did not feel they need it right now.  When asked abut patient setting up rides for SO, she states she has no problem with his insurance at all!    Will include this SW contact info in the letter to patient so she can call should she have any questions, issues or concerns.

## 2021-04-22 NOTE — TELEPHONE ENCOUNTER
mukund      Last Written Prescription Date:  10/31/18  Last Fill Quantity: 360 ml,   # refills: 11  Last Office Visit: 4/6/21  Future Office visit:

## 2021-05-18 NOTE — TELEPHONE ENCOUNTER
Pt called, reports that she has paracentesis on 5/13 in [place]. Pt reports ongoing abdominal issues, swelling, pain, etc. Reports minimal change in swelling since Thursday. Pt wondering if she should have another paracentesis. Pt was advised to contact her gastroenterologist regarding this. Pt verbalizes understanding.

## 2021-05-26 NOTE — TELEPHONE ENCOUNTER
Clinic Care Coordination Contact  Care Team Conversations    TC to patient to review results. She is willing to make follow appointment. Informed her that we will call to schedule.  Linda Mckenzie RN Oncology Care Coordinator

## 2021-05-26 NOTE — TELEPHONE ENCOUNTER
Patient called and would like pcp to review the MRI and CT scan results from 5/18 and call her with the results.

## 2021-06-21 NOTE — PATIENT INSTRUCTIONS
We would like to see you back after the bone survey and lab work.    We will set up weekly paracentesis.    When you are in need of a refill of your medications, please call your pharmacy and they will send us the request. If you have any questions please call [phone]

## 2021-06-21 NOTE — PROGRESS NOTES
Visit Date: 06/21/2021    HEMATOLOGY/ONCOLOGY CLINIC NOTE     HISTORY OF PRESENT ILLNESS:  Mrs. Miguel returns for followup of macrocytic anemia.  We had seen the patient in consultation at the request of Pam Acuña back on 03/22/2021.  At that time, Mrs. Miguel was a 55-year-old white female with multiple medical problems including history of alcohol abuse, alcoholic hepatitis, asthma, history of CVA, whom we were asked to evaluate concerning macrocytic anemia.  Apparently, she had recently seen Pam Acuña who ordered CBC which revealed a white count of 12.8, H and H 8.2 and 25.3, platelet count is 252, MCV was 125.  Peripheral blood smear was ordered and showed macrocytic anemia, increasing anisopoikilocytosis, folate deficiency, toxic neutrophilia.  The patient's folate level was low.  There was a mild reticulocytosis and folate level came back at 1.7.  Vitamin B12 67.  She also ordered a CT of the abdomen and pelvis on 03/18 and the findings were there was moderate ascites.  There was fatty infiltration of the liver.  The patient was a heavy drinker in the past, but had cut back to 2 beers a day.  She had drank hard liquor in the past.  She was also heavy smoker, smoked at least a pack per day for 40 years.  She has cut back to one-third of a pack.  Her major concern was related to abdominal distention and poor appetite.  When we saw the patient, we noted that she had abdominal distention with ascites.  We ordered an MRI of the abdomen.  This was done on 05/18/2021 and the findings were that there was large volume ascites.  CT chest was also ordered and revealed portal hypertension and the umbilical vein appeared to have recanalized, indicating portal hypertension.  There was bibasilar atelectasis and a large amount of ascites.  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  We also recommended GI evaluation.  She was seen by Yoselin Rodrigez PA-C, who felt the patient had cirrhosis and recommended Aldactone, Lasix and also paracentesis p.r.n. Lab work done by Yoselin Rodrigez included normal Ceruloplasmin level.  TRACI was negative.  Hepatitis B surface antigen was negative. Monoclonal M spike was also confirmed with a 2 M spikes, 1 noted 1.44 and the other 0.62.  Also, ascites fluid revealed that there was normal cell count, with 11% neutrophils, 18% lymphocytes, 71% basophils/monocytes. Albumin was less than 1.  Total protein was less than 3.  The patient is scheduled to see a gastroenterologist again in [place].  She said she would like to have a paracentesis done here.  She is getting increasing abdominally distended.  She is increasingly fatigued.  She denies any shortness of breath, although at her breathing can be difficult to do increase in ascites.    PHYSICAL EXAMINATION:    GENERAL:  She is a frail, middle-aged white female in no acute distress.  ECOG performance status is 1.  VITAL SIGNS:  Blood pressure 114/78, pulse 108, respirations 16, temperature 98.8.  HEENT:  Atraumatic, normocephalic.  Oropharynx is nonerythematous.  NECK:  Supple.  LUNGS:  Clear to auscultation and percussion.  HEART:  Regular rhythm, S1, normal.  ABDOMEN:  Distended with positive fluid wave.  LYMPHATICS:  No cervical or supraclavicular nodes.  EXTREMITIES:  No edema.    LABORATORY DATA:  Not done.    IMPRESSION:    1.  Macrocytic anemia.  We will repeat CBC.  Ssuspect secondary to underlying alcohol use versus paraprotein.  We will obtain a myeloma profile.  2.  End-stage liver disease with ascites.  The patient will follow up with GI. In the interim, we would like to order a diagnostic therapeutic paracentesis and send the fluid off for cytology.  We will also order weekly paracenteses.  Otherwise, repeat myeloma labs, CBC, CMP, LDH, and see the  patient in approximately 3-4 weeks.    Eighty-two minutes were spent on this patient.  Time was spent reviewing physician provider reports from [facility], lab work from [facility], performing history and physical, documenting history and physical, ordering and reviewing scans with Radiology and ordering paracentesis.    Maria De Jesus Hernández MD        D: 2021   T: 2021   MT: PAKMT    Name:     SAUL MCDONALD  MRN:      [id number]        Account:    [id number]   :      1965           Visit Date: 2021     Document: [id number]    cc:  WILIAM Muller

## 2021-06-21 NOTE — NURSING NOTE
"Chief Complaint   Patient presents with     RECHECK     lab and scan follow up       Initial /78   Pulse 108   Temp 98.8  F (37.1  C) (Tympanic)   Resp 16   Ht 1.626 m (5' 4\")   Wt 52.4 kg (115 lb 8.3 oz)   SpO2 99%   BMI 19.83 kg/m   Estimated body mass index is 19.83 kg/m  as calculated from the following:    Height as of this encounter: 1.626 m (5' 4\").    Weight as of this encounter: 52.4 kg (115 lb 8.3 oz).  Medication Reconciliation: complete  Aundrea Oates, RAIZA    "

## 2021-06-25 NOTE — PROGRESS NOTES
Patient called to remind of appointment with us on Monday.  Patient is to arrive here at 1115. Telephone number ([phone]) given to patient if she has questions.  Patient was unable to come yesterday because she was unable to get a ride.

## 2021-06-28 NOTE — PROGRESS NOTES
Procedure: US Paracentesis, right    There were  no complications and patient has no symptoms..      Tolerated procedure welL.     Patient to US for Paracentesis.  Positioned supine on table.  Draining yellow fluid.  Fluid is to be sent to lab.6000 ml total fld drained.   Pt tolerated the procedure well.      Discharge instructions given to pt.    Radiologist:Dr. Quiñonez    Time Out: Prior to the start of the procedure and with procedural staff participation, I verbally confirmed the patient s identity using two indicators, relevant allergies, that the procedure was appropriate and matched the consent or emergent situation, and that the correct equipment/implants were available. Immediately prior to starting the procedure I conducted the Time Out with the procedural staff and re-confirmed the patient s name, procedure, and site/side. (The Joint Commission universal protocol was followed.)  Yes    Position:  supine    Pain:  0    Sedation:None. Local Anesthestic used  No sedation    Estimated Blood Loss: Minimal     Condition: Stable    Comments: See dictated procedure note for full details     Dianelys Vick RN

## 2021-06-28 NOTE — IP AVS SNAPSHOT
[facility]  [address] [place] [address]  Phone: [phone]                                    After Visit Summary   6/28/2021    Nidhi Miguel    MRN: [id number]           After Visit Summary Signature Page    I have received my discharge instructions, and my questions have been answered. I have discussed any challenges I see with this plan with the nurse or doctor.    ..........................................................................................................................................  Patient/Patient Representative Signature      ..........................................................................................................................................  Patient Representative Print Name and Relationship to Patient    ..................................................               ................................................  Date                                   Time    ..........................................................................................................................................  Reviewed by Signature/Title    ...................................................              ..............................................  Date                                               Time          22EPIC Rev 08/18

## 2021-06-28 NOTE — IP AVS SNAPSHOT
After Visit Summary Template Not Found    This Print Group is only intended to be used in the After Visit Summary and can only be used in a report that uses a released After Visit Summary Template.                       MRN:[id number]                      After Visit Summary   6/28/2021    Nidhi Miguel    MRN: [id number]           Visit Information        Provider Department      6/28/2021 11:30 AM HII[facility]AD; HIX[facility]N; HIUS1 [facility]           Review of your medicines      Notice    This visit is during an admission. Changes to the med list made in this visit will be reflected in the After Visit Summary of the admission.           Protect others around you: Learn how to safely use, store and throw away your medicines at www.disposemymeds.org.       Follow-ups after your visit       Your next 10 appointments already scheduled    Jul 20, 2021  1:00 PM  LAB with HC LAB  [facility] ([facility] ) [address] [place] [address]  [phone]   Please do not eat 10-12 hours before your appointment if you are coming in fasting for labs on lipids, cholesterol, or glucose (sugar). Does not apply to pregnant women. Water, tea and black coffee (with nothing added) is okay. Do not drink other fluids, diet soda or gum. If you have concerns about taking your medications, please send a message by clicking on Secure Messaging, Message Your Care Team.     Jul 20, 2021  1:30 PM  (Arrive by 1:15 PM)  XR BONE SURVEY LIMITED with HIXR1  [facility] ([facility] ) [address] [phone]   How do I prepare for my exam? (Food and drink instructions)  No Food and Drink Restrictions.    How do I prepare for my exam? (Other instructions)  You do not need to do anything special for this exam.    What should I wear: Wear comfortable clothes. Leave your jewelry at home.    How long does the exam take: Most exams take less than 5 minutes.    What should I  "bring: It is safest to leave personal items at home.    Do I need a :  No  is needed.    What do I need to tell my doctor: Tell your doctor if there's any chance you are pregnant.    What should I do after the exam: No restrictions, You may resume normal activities.    What is this test: An image of a specific body part shown in shades of black and white.    Who should I call with questions: If you have any questions, please call the Imaging Department where you will have your exam. Directions, parking instructions, and other information is available on our website, [email or web address].     Jul 27, 2021  8:00 AM  (Arrive by 7:45 AM)  Return Visit with Maria De Jesus Hernández MD  [facility] ([facility] ) [address] [place] [address]  [phone]         Care Instructions       Further instructions from your care team           DISCHARGE INSTRUCTIONS  Paracentesis      IMPORTANT: As you prepare for discharge, the following information will help you return to your best level of health.       This Information Is About Your Follow Up Care  Call your doctor if you do not get better. Call sooner if you feel worse. You can reach your doctor by calling their clinic phone number.                                    This Information Is About Procedures      PARACENTESIS  This procedure involved the insertion of a needle into the space between your abdominal organs and the membrane that surrounds them (peritoneal space). It was done to get a sample of the fluid or to remove excess fluid.    Call your doctor if you have:    any drainage from the procedure site.    any redness, swelling, pain or pus around the procedure site.    swelling in your scrotum.    any new or severe symptoms.                        This Information Is About Your Illness and Diagnosis    ASCITES    Ascites is extra fluid in your abdomen. The lining inside your abdomen is called the \"peritoneum\". The " peritoneum is made up of two thin layers. One layer is attached to the wall of the abdomen. The other layer is wrapped around the abdominal organs.  These layers usually slide right next to each other, without extra fluid. With ascites, fluid builds up in the space between these layers.    Causes of ascites may include:    Cirrhosis of the liver (most common cause)    A blood clot in the vein of the liver    Congestive heart failure    Inflammation of the sac around the heart    Chronic hepatitis    Pancreatitis    Liver cancer    Nephrotic syndrome    Poor nutrition    Cancer in the abdomen    Chronic hemodialysis    Infection in the abdomen    When you go home, follow these instructions:    Take your medications as scheduled.    Follow a low sodium diet if it is ordered for you.     Try using herbs and other flavorings, like lemon juice, to season your food.    Avoid canned and prepackaged foods.    Do not add salt to any of your food, either while cooking or while eating.    Avoid salty snacks like chips, nuts, and crackers.  Try fresh fruit or vegetables instead.    Follow instructions given to you by your dietician at the hospital.    Sleep with your head elevated (on several pillows or with wood blocks under the head of the bed) if breathing is hard for you when lying flat.    Eat small frequent meals if you get full easily.    Keep follow up appointments with your doctor.     Call your doctor if:    You notice your clothes are getting tighter around the waist.    Your weight is increasing.    You are more short of breath than usual.    You are having more discomfort in your abdomen.    You have trouble following your diet.    You have any other questions or concerns.                                  Additional Information About Your Visit       Care EveryWhere ID    This is your Care EveryWhere ID. This could be used by other organizations to access your Mobile medical records  [id number]       Your  Vitals Were  Most recent update: 6/28/2021 12:00 PM    Blood Pressure   133/57 (BP Location: Left arm)    Pulse   103    Respirations   16    Pulse Oximetry   100%           Primary Care Provider Office Phone # Fax #    WILIAM Velasco [phone]      Equal Access to Services    [facility] : Hadii aad ku hadasho Soomaali, waaxda luqadaha, qaybta kaalmada adeegyada, waxay idiin hayaan adeeg khbreanna la'aan ah. So [facility] [phone].    ATENCIÓN: Si habla español, tiene a diop disposición servicios gratuitos de asistencia lingüística. Llame al [phone].    We comply with applicable federal and state civil rights laws, including the [place] Human Rights Act. We do not discriminate on the basis of race, color, creed, Judaism, national origin, marital status, age, disability, sex, sexual orientation, or gender identity.    If you would like an itemization of your charges they will now be available in [email or web address] 30 days after discharge. To access the itemized statements in [email or web address] go to billing/billing summary. From there select view account. There will be multiple tabs showing an overview of your account, detail, payments, and communications. From the communications tab you can see your monthly statements, your itemized statements, and any billing letters generated for your account. If you do not have a [email or web address] account and need help getting access please contact [email or web address] support at [phone].  If you would prefer to have your itemized statements mailed please contact our automated itemized bill request line at [phone] option  2.       Thank you!    Thank you for choosing [facility] for your care. Our goal is always to provide you with excellent care. Hearing back from our patients is one way we can continue to improve our services. Please take a few minutes to complete the written survey that you may receive in the mail after you visit with us. Thank you!         Medication List     Notice     This visit is during an admission. Changes to the med list made in this visit will be reflected in the After Visit Summary of the admission.

## 2021-07-07 NOTE — TELEPHONE ENCOUNTER
Pt had called to set up appt for paracentesis.  Called pt back, she agrees to Friday July 9th at 1230 arrive at 1215.

## 2021-07-09 NOTE — IP AVS SNAPSHOT
[facility]  [address] [place] [address]  Phone: [phone]                                    After Visit Summary   7/9/2021    Nidhi Miguel    MRN: [id number]           After Visit Summary Signature Page    I have received my discharge instructions, and my questions have been answered. I have discussed any challenges I see with this plan with the nurse or doctor.    ..........................................................................................................................................  Patient/Patient Representative Signature      ..........................................................................................................................................  Patient Representative Print Name and Relationship to Patient    ..................................................               ................................................  Date                                   Time    ..........................................................................................................................................  Reviewed by Signature/Title    ...................................................              ..............................................  Date                                               Time          22EPIC Rev 08/18

## 2021-07-09 NOTE — PROGRESS NOTES
Procedure: US Paracentesis, NA    There were  no complications and patient has no symptoms..      Tolerated procedure well.    Patient to US for Paracentesis.  Positioned supine on table.  Draining clear yellow fluid.  Fluid is not to be sent to lab. 5400 ml total fld drained.   Pt tolerated the procedure well.      Discharge instructions given to pt.    Radiologist:Dr Armstrong/Lucas    Time Out: Prior to the start of the procedure and with procedural staff participation, I verbally confirmed the patient s identity using two indicators, relevant allergies, that the procedure was appropriate and matched the consent or emergent situation, and that the correct equipment/implants were available. Immediately prior to starting the procedure I conducted the Time Out with the procedural staff and re-confirmed the patient s name, procedure, and site/side. (The Joint Commission universal protocol was followed.)  Yes    Position:  supine    Pain:  5 prior to procedure related to pressure    Sedation:None. Local Anesthestic used  No sedation    Estimated Blood Loss: None     Condition: Stable    Comments: See dictated procedure note for full details     Aury Santiago RN

## 2021-07-09 NOTE — IP AVS SNAPSHOT
After Visit Summary Template Not Found    This Print Group is only intended to be used in the After Visit Summary and can only be used in a report that uses a released After Visit Summary Template.                       MRN:[id number]                      After Visit Summary   7/9/2021    Nidhi Miguel    MRN: [id number]           Visit Information        Provider Department      7/9/2021 12:30 PM HIIRRAD; HIXRRN; HIUS1 [facility]           Review of your medicines      Notice    This visit is during an admission. Changes to the med list made in this visit will be reflected in the After Visit Summary of the admission.           Protect others around you: Learn how to safely use, store and throw away your medicines at www.disposemymeds.org.       Follow-ups after your visit       Your next 10 appointments already scheduled    Jul 20, 2021  1:00 PM  LAB with HC LAB  [facility] ([facility] ) [address] [place] [address]  [phone]   Please do not eat 10-12 hours before your appointment if you are coming in fasting for labs on lipids, cholesterol, or glucose (sugar). Does not apply to pregnant women. Water, tea and black coffee (with nothing added) is okay. Do not drink other fluids, diet soda or gum. If you have concerns about taking your medications, please send a message by clicking on Secure Messaging, Message Your Care Team.     Jul 20, 2021  1:30 PM  (Arrive by 1:15 PM)  XR BONE SURVEY LIMITED with HIXR1  [facility] ([facility] ) [address] [phone]   How do I prepare for my exam? (Food and drink instructions)  No Food and Drink Restrictions.    How do I prepare for my exam? (Other instructions)  You do not need to do anything special for this exam.    What should I wear: Wear comfortable clothes. Leave your jewelry at home.    How long does the exam take: Most exams take less than 5 minutes.    What should I  "bring: It is safest to leave personal items at home.    Do I need a :  No  is needed.    What do I need to tell my doctor: Tell your doctor if there's any chance you are pregnant.    What should I do after the exam: No restrictions, You may resume normal activities.    What is this test: An image of a specific body part shown in shades of black and white.    Who should I call with questions: If you have any questions, please call the Imaging Department where you will have your exam. Directions, parking instructions, and other information is available on our website, [email or web address].     Jul 27, 2021  8:00 AM  (Arrive by 7:45 AM)  Return Visit with Maria De Jesus Hernández MD  [facility] ([facility] ) [address] [place] [address]  [phone]         Care Instructions       Further instructions from your care team           DISCHARGE INSTRUCTIONS  Paracentesis      IMPORTANT: As you prepare for discharge, the following information will help you return to your best level of health.       This Information Is About Your Follow Up Care  Call your doctor if you do not get better. Call sooner if you feel worse. You can reach your doctor by calling their clinic phone number.                                    This Information Is About Procedures      PARACENTESIS  This procedure involved the insertion of a needle into the space between your abdominal organs and the membrane that surrounds them (peritoneal space). It was done to get a sample of the fluid or to remove excess fluid.    Call your doctor if you have:    any drainage from the procedure site.    any redness, swelling, pain or pus around the procedure site.    swelling in your scrotum.    any new or severe symptoms.                        This Information Is About Your Illness and Diagnosis    ASCITES    Ascites is extra fluid in your abdomen. The lining inside your abdomen is called the \"peritoneum\". The " peritoneum is made up of two thin layers. One layer is attached to the wall of the abdomen. The other layer is wrapped around the abdominal organs.  These layers usually slide right next to each other, without extra fluid. With ascites, fluid builds up in the space between these layers.    Causes of ascites may include:    Cirrhosis of the liver (most common cause)    A blood clot in the vein of the liver    Congestive heart failure    Inflammation of the sac around the heart    Chronic hepatitis    Pancreatitis    Liver cancer    Nephrotic syndrome    Poor nutrition    Cancer in the abdomen    Chronic hemodialysis    Infection in the abdomen    When you go home, follow these instructions:    Take your medications as scheduled.    Follow a low sodium diet if it is ordered for you.     Try using herbs and other flavorings, like lemon juice, to season your food.    Avoid canned and prepackaged foods.    Do not add salt to any of your food, either while cooking or while eating.    Avoid salty snacks like chips, nuts, and crackers.  Try fresh fruit or vegetables instead.    Follow instructions given to you by your dietician at the hospital.    Sleep with your head elevated (on several pillows or with wood blocks under the head of the bed) if breathing is hard for you when lying flat.    Eat small frequent meals if you get full easily.    Keep follow up appointments with your doctor.     Call your doctor if:    You notice your clothes are getting tighter around the waist.    Your weight is increasing.    You are more short of breath than usual.    You are having more discomfort in your abdomen.    You have trouble following your diet.    You have any other questions or concerns.                                  Additional Information About Your Visit       MyChart Information    [employer] lets you send messages to your doctor, view your test results, renew your prescriptions, schedule appointments and more. To sign up, go  "to [email or web address] . Click on \"Log in\" on the left side of the screen, which will take you to the Welcome page. Then click on \"Sign up Now\" on the right side of the page.     You will be asked to enter the access code listed below, as well as some personal information. Please follow the directions to create your username and password.     Your access code is: [email or web address]  Expires: 2021 12:49 PM     Your access code will  in 60 days. If you need help or a new code, please call your   [facility] clinic or [phone].       Care EveryWhere ID    This is your Care EveryWhere ID. This could be used by other organizations to access your [facility] medical records  [id number]       Your Vitals Were  Most recent update: 2021 12:32 PM    Blood Pressure   108/64 (BP Location: Left arm)    Pulse   103    Respirations   16    Pulse Oximetry   100%           Primary Care Provider Office Phone # Fax #    PamWILIAM Rojas [phone]      Equal Access to Services    [facility]: Hadii aad ku hadasho Soomaali, waaxda luqadaha, qaybta kaalmada adeegyada, ramos choi . So [facility] [phone].    ATENCIÓN: Si habla español, tiene a diop disposición servicios gratuitos de asistencia lingüística. Llame al [phone].    We comply with applicable federal and state civil rights laws, including the [place] Human Rights Act. We do not discriminate on the basis of race, color, creed, Rastafarian, national origin, marital status, age, disability, sex, sexual orientation, or gender identity.    If you would like an itemization of your charges they will now be available in [employer] 30 days after discharge. To access the itemized statements in [employer] go to billing/billing summary. From there select view account. There will be multiple tabs showing an overview of your account, detail, payments, and communications. From the communications tab you can see your " monthly statements, your itemized statements, and any billing letters generated for your account. If you do not have a [employer] account and need help getting access please contact [employer] support at [phone].  If you would prefer to have your itemized statements mailed please contact our automated itemized bill request line at [phone] option  2.       Thank you!    Thank you for choosing [facility] for your care. Our goal is always to provide you with excellent care. Hearing back from our patients is one way we can continue to improve our services. Please take a few minutes to complete the written survey that you may receive in the mail after you visit with us. Thank you!         Medication List     Notice    This visit is during an admission. Changes to the med list made in this visit will be reflected in the After Visit Summary of the admission.

## 2021-07-16 NOTE — PLAN OF CARE
Procedure: US Paracentesis, NA    There were  no complications and patient has no symptoms..      Tolerated procedure well.    Patient to US for Paracentesis.  Positioned supine on table.  Draining cloudy yellow fluid.  Fluid is not to be sent to lab. 3200 ml total fld drained.   Pt tolerated the procedure well.      Discharge instructions given to pt.    Radiologist:Dr. Quiñonez    Time Out: Prior to the start of the procedure and with procedural staff participation, I verbally confirmed the patient s identity using two indicators, relevant allergies, that the procedure was appropriate and matched the consent or emergent situation, and that the correct equipment/implants were available. Immediately prior to starting the procedure I conducted the Time Out with the procedural staff and re-confirmed the patient s name, procedure, and site/side. (The Joint Commission universal protocol was followed.)  Yes    Position:  supine    Pain:  0    Sedation:None. Local Anesthestic used  No sedation    Estimated Blood Loss: None     Condition: Stable    Comments: See dictated procedure note for full details     Aury Santiago RN

## 2021-07-22 NOTE — PROGRESS NOTES
Patient called regarding having paracentesis.  Patient called and updated that we have 07/26 available to arrive at 1215 for 1230.  Patient agreeable to this.

## 2021-07-26 NOTE — DISCHARGE INSTRUCTIONS
Discharge Instructions for Paracentesis  Paracentesis is a procedure to remove extra fluid from your belly (abdomen). This fluid buildup in the abdomen is called ascites. The procedure may have been done to take a sample of the fluid. Or, it may have been done to drain the extra fluid from your abdomen and help make you more comfortable.      Ascites is buildup of excess fluid in the abdomen.   Home care    If you have pain after the procedure, your healthcare provider can prescribe or recommend pain medicines. Take these exactly as directed. If you stopped taking other medicines before the procedure, ask your provider when you can start them again.    Take it easy for 24 hours after the procedure. Don't do any physical activity until your provider says it s OK.    You will have a small bandage over the puncture site. Stitches, surgical staples, adhesive tapes, adhesive strips, or surgical glue may be used to close the incision. They also help stop bleeding and speed healing. You may take the bandage off in 24 hours.    Check the puncture site for the signs of infection listed below.    Follow-up care  Make a follow-up appointment with your healthcare provider as directed. During your follow-up visit, your provider will check your healing. Let your provider know how you are feeling. You can also discuss the cause of your ascites and if you need any further treatment. If your fluid is infected, you will be sent home on antibiotics. In some cases, the paracentesis may need to be repeated if the fluid returns. Your provider may also prescribe medicines that increase urination (diuretics) to decrease the buildup of fluid.   When to call your healthcare provider  Call your healthcare provider if you have any of the following after the procedure:    A fever of 100.4  F ( 38.0 C) or higher, or as directed by your provider    Chills    Trouble breathing    Pain that doesn't go away even after taking pain medicine    Belly  pain not caused by having the skin punctured    Bleeding from the puncture site    More than a small amount of fluid leaking from the puncture site    Swollen belly    Signs of infection at the puncture site. These include increased pain, redness, or swelling, warmth, or bad-smelling drainage.    Blood in your urine    Feeling dizzy or lightheaded, or fainting  Marisol last reviewed this educational content on 10/1/2019    3739-7814 [employer]. All rights reserved. This information is not intended as a substitute for professional medical care. Always follow your healthcare professional's instructions.

## 2021-07-26 NOTE — IP AVS SNAPSHOT
After Visit Summary Template Not Found    This Print Group is only intended to be used in the After Visit Summary and can only be used in a report that uses a released After Visit Summary Template.                       MRN:[id number]                      After Visit Summary   7/26/2021    Nidhi Miguel    MRN: [id number]           Visit Information        Provider Department      7/26/2021 12:30 PM HIIRRAD; HIXRRN; HIUS1 HI ULTRASOUND           Review of your medicines      Notice    This visit is during an admission. Changes to the med list made in this visit will be reflected in the After Visit Summary of the admission.           Protect others around you: Learn how to safely use, store and throw away your medicines at www.disposemymeds.org.       Follow-ups after your visit       Your next 10 appointments already scheduled    Jul 27, 2021  8:00 AM  (Arrive by 7:45 AM)  Return Visit with Maria De Jesus Hernández MD  [facility] ([facility] ) [address] [place] [address]  [phone]      Aug 02, 2021 11:30 AM  (Arrive by 11:15 AM)  US PARACENTESIS with HIUS1, HIXRRN, HIIRRAD  HI ULTRASOUND ([facility] ) [address] [place] [address]  [phone]   How do I prepare for my exam? (Food and drink instructions)  No Food and Drink Restrictions.    How do I prepare for my exam? (Other instructions)  IF YOUR DOCTOR ALSO PRESCRIBED SEDATION DURING THE EXAM (medicine to help you relax): You will receive separate instructions about driving, eating, and additional tests that may be necessary prior to your exam day.    What should I wear: Wear comfortable clothes.    How long does the exam take: Aspiration (no sedation treatment takes about an hour).  For paracentesis, thoracentesis or sedation plan to spend at least three hours at the hospital.    What should I bring: Bring a list of your medicines, including vitamins, minerals and over-the-counter drugs.  It is safest to leave personal items at home. Bring your 's license or photo ID and your insurance card.    Do I need a :  No  is needed.    What do I need to tell my doctor:  Tell your doctor in advance:  * If you are or may be pregnant.  * If you are taking Coumadin (or any other blood thinners) 5 days prior to the exam for any special instructions.  * If you are diabetic to determine if your insulin needs have to be adjusted for the exam.    What should I do after the exam: Take it easy the rest of the day. You can return to normal activities the next day.    What is this test: This test uses a long, thin needle or tube to remove tissue, fluid, or other cells from your body. Pictures from an ultrasound will guide the needle to the right place. (Ultrasound uses sound waves to create pictures of the body on a video screen. You will not feel the sound waves.)    * A biopsy removes tissue or other cells from the body; we send the tissue or cells to a lab for testing.  * Paracentesis removes fluid from the belly (abdomen) to relieve pressure, to test the fluid or both.  * Thoracentesis removes fluid from the sac around the lungs to relieve pressure, to test the fluid or both.  * Aspiration removes fluid from any part of the body, then the fluid is tested for disease or infection.    Who should I call with questions: If you have any questions, please call the Imaging Department where you will have your exam. Directions, parking instructions, and other information are available on our website, [email or web address].     Aug 09, 2021 11:30 AM  (Arrive by 11:15 AM)  US PARACENTESIS with HIUS1, HIXRRN, HIIRRAD  HI ULTRASOUND ([facility] ) [address] [place] [address]  [phone]   How do I prepare for my exam? (Food and drink instructions)  No Food and Drink Restrictions.    How do I prepare for my exam? (Other instructions)  IF YOUR DOCTOR ALSO PRESCRIBED SEDATION  DURING THE EXAM (medicine to help you relax): You will receive separate instructions about driving, eating, and additional tests that may be necessary prior to your exam day.    What should I wear: Wear comfortable clothes.    How long does the exam take: Aspiration (no sedation treatment takes about an hour).  For paracentesis, thoracentesis or sedation plan to spend at least three hours at the hospital.    What should I bring: Bring a list of your medicines, including vitamins, minerals and over-the-counter drugs. It is safest to leave personal items at home. Bring your 's license or photo ID and your insurance card.    Do I need a :  No  is needed.    What do I need to tell my doctor:  Tell your doctor in advance:  * If you are or may be pregnant.  * If you are taking Coumadin (or any other blood thinners) 5 days prior to the exam for any special instructions.  * If you are diabetic to determine if your insulin needs have to be adjusted for the exam.    What should I do after the exam: Take it easy the rest of the day. You can return to normal activities the next day.    What is this test: This test uses a long, thin needle or tube to remove tissue, fluid, or other cells from your body. Pictures from an ultrasound will guide the needle to the right place. (Ultrasound uses sound waves to create pictures of the body on a video screen. You will not feel the sound waves.)    * A biopsy removes tissue or other cells from the body; we send the tissue or cells to a lab for testing.  * Paracentesis removes fluid from the belly (abdomen) to relieve pressure, to test the fluid or both.  * Thoracentesis removes fluid from the sac around the lungs to relieve pressure, to test the fluid or both.  * Aspiration removes fluid from any part of the body, then the fluid is tested for disease or infection.    Who should I call with questions: If you have any questions, please call the Imaging Department where you  will have your exam. Directions, parking instructions, and other information are available on our website, [email or web address].     Aug 16, 2021 11:30 AM  (Arrive by 11:15 AM)  US PARACENTESIS with HIUS1, HIXRRN, HIIRRAD  HI ULTRASOUND ([facility] ) [address] [place] [address]  [phone]   How do I prepare for my exam? (Food and drink instructions)  No Food and Drink Restrictions.    How do I prepare for my exam? (Other instructions)  IF YOUR DOCTOR ALSO PRESCRIBED SEDATION DURING THE EXAM (medicine to help you relax): You will receive separate instructions about driving, eating, and additional tests that may be necessary prior to your exam day.    What should I wear: Wear comfortable clothes.    How long does the exam take: Aspiration (no sedation treatment takes about an hour).  For paracentesis, thoracentesis or sedation plan to spend at least three hours at the hospital.    What should I bring: Bring a list of your medicines, including vitamins, minerals and over-the-counter drugs. It is safest to leave personal items at home. Bring your 's license or photo ID and your insurance card.    Do I need a :  No  is needed.    What do I need to tell my doctor:  Tell your doctor in advance:  * If you are or may be pregnant.  * If you are taking Coumadin (or any other blood thinners) 5 days prior to the exam for any special instructions.  * If you are diabetic to determine if your insulin needs have to be adjusted for the exam.    What should I do after the exam: Take it easy the rest of the day. You can return to normal activities the next day.    What is this test: This test uses a long, thin needle or tube to remove tissue, fluid, or other cells from your body. Pictures from an ultrasound will guide the needle to the right place. (Ultrasound uses sound waves to create pictures of the body on a video screen. You will not feel the sound waves.)    * A biopsy  removes tissue or other cells from the body; we send the tissue or cells to a lab for testing.  * Paracentesis removes fluid from the belly (abdomen) to relieve pressure, to test the fluid or both.  * Thoracentesis removes fluid from the sac around the lungs to relieve pressure, to test the fluid or both.  * Aspiration removes fluid from any part of the body, then the fluid is tested for disease or infection.    Who should I call with questions: If you have any questions, please call the Imaging Department where you will have your exam. Directions, parking instructions, and other information are available on our website, [email or web address].        Care Instructions       Further instructions from your care team         Discharge Instructions for Paracentesis  Paracentesis is a procedure to remove extra fluid from your belly (abdomen). This fluid buildup in the abdomen is called ascites. The procedure may have been done to take a sample of the fluid. Or, it may have been done to drain the extra fluid from your abdomen and help make you more comfortable.      Ascites is buildup of excess fluid in the abdomen.   Home care    If you have pain after the procedure, your healthcare provider can prescribe or recommend pain medicines. Take these exactly as directed. If you stopped taking other medicines before the procedure, ask your provider when you can start them again.    Take it easy for 24 hours after the procedure. Don't do any physical activity until your provider says it s OK.    You will have a small bandage over the puncture site. Stitches, surgical staples, adhesive tapes, adhesive strips, or surgical glue may be used to close the incision. They also help stop bleeding and speed healing. You may take the bandage off in 24 hours.    Check the puncture site for the signs of infection listed below.    Follow-up care  Make a follow-up appointment with your healthcare provider as directed. During your follow-up  "visit, your provider will check your healing. Let your provider know how you are feeling. You can also discuss the cause of your ascites and if you need any further treatment. If your fluid is infected, you will be sent home on antibiotics. In some cases, the paracentesis may need to be repeated if the fluid returns. Your provider may also prescribe medicines that increase urination (diuretics) to decrease the buildup of fluid.   When to call your healthcare provider  Call your healthcare provider if you have any of the following after the procedure:    A fever of 100.4  F ( 38.0 C) or higher, or as directed by your provider    Chills    Trouble breathing    Pain that doesn't go away even after taking pain medicine    Belly pain not caused by having the skin punctured    Bleeding from the puncture site    More than a small amount of fluid leaking from the puncture site    Swollen belly    Signs of infection at the puncture site. These include increased pain, redness, or swelling, warmth, or bad-smelling drainage.    Blood in your urine    Feeling dizzy or lightheaded, or fainting  [employer] last reviewed this educational content on 10/1/2019    6176-4969 [employer]. All rights reserved. This information is not intended as a substitute for professional medical care. Always follow your healthcare professional's instructions.          Additional Information About Your Visit       MyChart Information    [employer] lets you send messages to your doctor, view your test results, renew your prescriptions, schedule appointments and more. To sign up, go to [email or web address] . Click on \"Log in\" on the left side of the screen, which will take you to the Welcome page. Then click on \"Sign up Now\" on the right side of the page.     You will be asked to enter the access code listed below, as well as some personal information. Please follow the directions to create your username and password.     Your access code " is: [email or web address]  Expires: 2021 12:49 PM     Your access code will  in 60 days. If you need help or a new code, please call your   [facility] clinic or [phone].       Care EveryWhere ID    This is your Care EveryWhere ID. This could be used by other organizations to access your [facility] medical records  [id number]        Primary Care Provider Office Phone # Fax #    WILIAM Velasco [phone]      Equal Access to Services    [facility]: Hadii aad ku hadasho Soomaali, waaxda luqadaha, qaybta kaalmada adeegyada, ramos valdivia hayhumphrey choi . So [facility] [phone].    ATENCIÓN: Si habla español, tiene a diop disposición servicios gratuitos de asistencia lingüística. Llame al [phone].    We comply with applicable federal and state civil rights laws, including the [place] Human Rights Act. We do not discriminate on the basis of race, color, creed, Presybeterian, national origin, marital status, age, disability, sex, sexual orientation, or gender identity.    If you would like an itemization of your charges they will now be available in [employer] 30 days after discharge. To access the itemized statements in [employer] go to billing/billing summary. From there select view account. There will be multiple tabs showing an overview of your account, detail, payments, and communications. From the communications tab you can see your monthly statements, your itemized statements, and any billing letters generated for your account. If you do not have a [employer] account and need help getting access please contact [employer] support at [phone].  If you would prefer to have your itemized statements mailed please contact our automated itemized bill request line at [phone] option  2.       Thank you!    Thank you for choosing [facility] for your care. Our goal is always to provide you with excellent care. Hearing back from our patients is one way we can continue to improve  our services. Please take a few minutes to complete the written survey that you may receive in the mail after you visit with us. Thank you!         Medication List     Notice    This visit is during an admission. Changes to the med list made in this visit will be reflected in the After Visit Summary of the admission.

## 2021-07-26 NOTE — PROGRESS NOTES
"Procedure: US Paracentesis, left    There were  no complications and patient has no symptoms..      Tolerated procedure WELL.    Patient to US for Paracentesis.  Positioned supine on table.  Draining CLOUDY YELLOW fluid.  Fluid WILL NOT be sent to lab. 5200 ml total fld drained.   Pt tolerated the procedure WELL.      Discharge instructions given to pt.    Radiologist:{Radiologist:[id number]::\"Dr. Espinoza    Time Out: Prior to the start of the procedure and with procedural staff participation, I verbally confirmed the patient s identity using two indicators, relevant allergies, that the procedure was appropriate and matched the consent or emergent situation, and that the correct equipment/implants were available. Immediately prior to starting the procedure I conducted the Time Out with the procedural staff and re-confirmed the patient s name, procedure, and site/side. (The Joint Commission universal protocol was followed.)  Yes    Position:  supine    Pain:  0    Sedation:None. Local Anesthestic used  No sedation    Estimated Blood Loss: None     Condition: Stable    Comments: See dictated procedure note for full details     KERA NEVES RN          "

## 2021-07-27 NOTE — PROGRESS NOTES
Visit Date: 07/27/2021    HISTORY OF PRESENT ILLNESS:  Ms. Miguel returns for followup of macrocytic anemia.  We were asked to see the patient in consultation at the request of Pma Acuña back on 03/22/2021.  At that time, Ms. Miguel was a 55-year-old white female with multiple medical problems including history of alcohol abuse, alcoholic hepatitis, asthma, history of CVA, we were asked to evaluate concerning macrocytic anemia.  Apparently, she had recently seen Pam Acuña who ordered CBC, which revealed a white count of 12.8, H and H 8.2 and 25.3, platelet count is 252, MCV was 125.  Peripheral blood smear was ordered and showed macrocytic anemia increasing anisopoikilocytosis folate deficiency, toxic neutrophilia.  The patient's folate level was low.  There was a mild reticulocytosis and folate level came back at 1.7.  Vitamin B12 was 167.  She also ordered CT abdomen and pelvis on 03/18/2021, the findings were there was moderate ascites.  There was fatty infiltration of the liver.  The patient was a heavy drinker in the past.  She had cut back to 2 beers a day.  She had drank hard liquor in the past.  She was also heavy smoker, smoked at least a pack per day for 40 years.  She has cut back to one-third of a pack.  Her major concern was related to abdominal distention and poor appetite.  When we saw the patient, we noted that she had abdominal distention with ascites.  We ordered an MRI of the abdomen.  This was done 05/18/2021 and the findings were there was large volume ascites.  CT chest was also ordered and revealed portal hypertension and the umbilical vein appeared to have recanalized indicating portal hypertension.  There was bibasilar atelectasis and a large amount of ascites.  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  We  also recommend GI evaluation.  She was seen by Leah Rodrigez PA-C, who felt the patient had cirrhosis and recommended Aldactone, Lasix and also paracentesis p.r.n.  Lab work done by Leah Rodrigez included normal ceruloplasmin level.  TRACI was negative.  Hepatitis B surface antigen was negative for monoclonal spike revealed 2 S-spikes 1 noted 1.44 and the other 0.62.  Also, ascites fluid revealed there was normal cell count with 11% neutrophils, 18% lymphocytes.  Albumin was less than 1.  The patient was scheduled to see another gastroenterologist, was scheduled to have EGD and colonoscopy. During her paracentesis yesterday a total of 5200 mL was withdrawn.  Cytology on the fluid was negative for malignancy.  We elected to repeat myeloma labs to see if she did have a monoclonal gammopathy.  Apparently a repeat serum protein electrophoresis,  revealed no evidence of monoclonal gammopathy and there was no M spike, it was 0.  There was no monoclonal protein seen on immunofixation.  Kappa lambda ratio 0.97.  Therefore, the patient does not have a monoclonal gammopathy.  The patient states she is not drinking currently she is scheduled to have an EGD and colonoscopy with a gastroenterologist or surgeon named Dr. Ash. Otherwise, she complains of abdominal distention, weight loss.  ECOG performance status was worse and is approximately a 2.  Her major complaint was related to fatigue.    PHYSICAL EXAMINATION:    GENERAL:  She is a middle-aged white female in no acute distress.  VITAL SIGNS:  Reveal blood pressure 90/52, pulse 99, respirations 16, temperature 97.8.  HEENT:  Atraumatic, normocephalic.  Oropharynx nonerythematous.  NECK:  Supple.  LUNGS:  Clear to auscultation and percussion.  ABDOMEN:  Distended with positive fluid wave.  Moderate periumbilical tenderness to palpation.  LYMPHATICS:  No supraclavicular, axillary nodes.  EXTREMITIES:  With trace ankle edema.  NEUROLOGIC:  Nonfocal.    LABORATORY DATA:   Reveal CBC -- white count 6.8, H and H 8.1 and 24.3, MCV was 110, albumin 2.5, total bilirubin 2.1, AST 55, ALT 22.    IMPRESSION:  1.  Macrocytic anemia.  No evidence of monoclonal gammopathy.  At this point, suspect secondary to liver disease, alcohol use.  2.  End-stage liver disease, ascites.  The patient continues p.r.n. paracentesis.  She will follow up with GI.  She was scheduled to have an EGD.  We offered her palliative care.  The patient refuses.  3.  Weight loss.  We will see the patient in 4 months.  Obtain CBC, CMP, LDH, CT chest, abdomen, pelvis and also myeloma profile Seventy-eight minutes was spent with the patient.  We spent time reviewing previous provider notes, imaging results and ultrasound reports, performing history and physical, documenting history and physical and ordering followup labs and scans.    Maria De Jesus Hernández MD        D: 2021   T: 2021   MT: matt    Name:     SAUL MCDONALD  MRN:      [id number]        Account:    [id number]   :      1965           Visit Date: 2021     Document: [id number]    cc:  WILIAM Muller

## 2021-07-27 NOTE — NURSING NOTE
"Chief Complaint   Patient presents with     RECHECK     scans       Initial BP 92/62   Pulse 99   Temp 97.8  F (36.6  C) (Tympanic)   Resp 16   Ht 1.626 m (5' 4\")   Wt 47.5 kg (104 lb 11.5 oz)   SpO2 100%   BMI 17.97 kg/m   Estimated body mass index is 17.97 kg/m  as calculated from the following:    Height as of this encounter: 1.626 m (5' 4\").    Weight as of this encounter: 47.5 kg (104 lb 11.5 oz).  Medication Reconciliation: complete  Linda Mancera RN  "

## 2021-07-27 NOTE — PATIENT INSTRUCTIONS
We would like to see you back in 4 months with ct chest abdomen and pelvis, labs the week prior. When you are in need of a refill of your medications, please call your pharmacy and they will send us the request. If you have any questions please call [phone]

## 2021-08-02 NOTE — PLAN OF CARE
Procedure: US Paracentesis, NA    There were  no complications and patient has no symptoms..      Tolerated procedure very well.    Patient to US for Paracentesis.  Positioned supine on table.  Draining clear shamar fluid.  Fluid is not to be sent to lab. 6000 ml total fld drained.   Pt tolerated the procedure very well.      Discharge instructions given to pt. Patient did not sign that she received her discharge instructions.  Patient did take them home with her.  Her next appointment 08/09 is documented on there along with the volume of fluid (6000ml) removed.     Radiologist:Dr. Quiñonez    Time Out: Prior to the start of the procedure and with procedural staff participation, I verbally confirmed the patient s identity using two indicators, relevant allergies, that the procedure was appropriate and matched the consent or emergent situation, and that the correct equipment/implants were available. Immediately prior to starting the procedure I conducted the Time Out with the procedural staff and re-confirmed the patient s name, procedure, and site/side. (The Joint Commission universal protocol was followed.)  Yes    Position:  supine    Pain:  0    Sedation:None. Local Anesthestic used  No sedation    Estimated Blood Loss: None     Condition: Stable    Comments: See dictated procedure note for full details     Aury Santiago, RN

## 2021-08-02 NOTE — IP AVS SNAPSHOT
[facility]  [address] [place] [address]  Phone: [phone]                                    After Visit Summary   8/2/2021    Nidhi Miguel    MRN: [id number]           After Visit Summary Signature Page    I have received my discharge instructions, and my questions have been answered. I have discussed any challenges I see with this plan with the nurse or doctor.    ..........................................................................................................................................  Patient/Patient Representative Signature      ..........................................................................................................................................  Patient Representative Print Name and Relationship to Patient    ..................................................               ................................................  Date                                   Time    ..........................................................................................................................................  Reviewed by Signature/Title    ...................................................              ..............................................  Date                                               Time          22EPIC Rev 08/18

## 2021-08-02 NOTE — IP AVS SNAPSHOT
After Visit Summary Template Not Found    This Print Group is only intended to be used in the After Visit Summary and can only be used in a report that uses a released After Visit Summary Template.                       MRN:[id number]                      After Visit Summary   8/2/2021    Nidhi Miguel    MRN: [id number]           Visit Information        Provider Department      8/2/2021 11:30 AM HIIRRAD; HIXRRN; [facility]           Review of your medicines      Notice    This visit is during an admission. Changes to the med list made in this visit will be reflected in the After Visit Summary of the admission.           Protect others around you: Learn how to safely use, store and throw away your medicines at www.disposemymeds.org.       Follow-ups after your visit       Your next 10 appointments already scheduled    Aug 09, 2021 11:30 AM  (Arrive by 11:15 AM)  US PARACENTESIS with HIUS1, HIXRRN, HIIRRAD  [facility] ([facility] ) [address] [place] [address]  [phone]   How do I prepare for my exam? (Food and drink instructions)  No Food and Drink Restrictions.    How do I prepare for my exam? (Other instructions)  IF YOUR DOCTOR ALSO PRESCRIBED SEDATION DURING THE EXAM (medicine to help you relax): You will receive separate instructions about driving, eating, and additional tests that may be necessary prior to your exam day.    What should I wear: Wear comfortable clothes.    How long does the exam take: Aspiration (no sedation treatment takes about an hour).  For paracentesis, thoracentesis or sedation plan to spend at least three hours at the hospital.    What should I bring: Bring a list of your medicines, including vitamins, minerals and over-the-counter drugs. It is safest to leave personal items at home. Bring your 's license or photo ID and your insurance card.    Do I need a :  No  is needed.    What do I need to tell my doctor:  Tell  your doctor in advance:  * If you are or may be pregnant.  * If you are taking Coumadin (or any other blood thinners) 5 days prior to the exam for any special instructions.  * If you are diabetic to determine if your insulin needs have to be adjusted for the exam.    What should I do after the exam: Take it easy the rest of the day. You can return to normal activities the next day.    What is this test: This test uses a long, thin needle or tube to remove tissue, fluid, or other cells from your body. Pictures from an ultrasound will guide the needle to the right place. (Ultrasound uses sound waves to create pictures of the body on a video screen. You will not feel the sound waves.)    * A biopsy removes tissue or other cells from the body; we send the tissue or cells to a lab for testing.  * Paracentesis removes fluid from the belly (abdomen) to relieve pressure, to test the fluid or both.  * Thoracentesis removes fluid from the sac around the lungs to relieve pressure, to test the fluid or both.  * Aspiration removes fluid from any part of the body, then the fluid is tested for disease or infection.    Who should I call with questions: If you have any questions, please call the Imaging Department where you will have your exam. Directions, parking instructions, and other information are available on our website, [email or web address].     Aug 16, 2021 11:30 AM  (Arrive by 11:15 AM)  US PARACENTESIS with HIUS1, HIXRRN, HIIRRAD  [facility] ([facility] ) [address] [place] [address]  [phone]   How do I prepare for my exam? (Food and drink instructions)  No Food and Drink Restrictions.    How do I prepare for my exam? (Other instructions)  IF YOUR DOCTOR ALSO PRESCRIBED SEDATION DURING THE EXAM (medicine to help you relax): You will receive separate instructions about driving, eating, and additional tests that may be necessary prior to your exam day.    What should I wear: Wear  comfortable clothes.    How long does the exam take: Aspiration (no sedation treatment takes about an hour).  For paracentesis, thoracentesis or sedation plan to spend at least three hours at the hospital.    What should I bring: Bring a list of your medicines, including vitamins, minerals and over-the-counter drugs. It is safest to leave personal items at home. Bring your 's license or photo ID and your insurance card.    Do I need a :  No  is needed.    What do I need to tell my doctor:  Tell your doctor in advance:  * If you are or may be pregnant.  * If you are taking Coumadin (or any other blood thinners) 5 days prior to the exam for any special instructions.  * If you are diabetic to determine if your insulin needs have to be adjusted for the exam.    What should I do after the exam: Take it easy the rest of the day. You can return to normal activities the next day.    What is this test: This test uses a long, thin needle or tube to remove tissue, fluid, or other cells from your body. Pictures from an ultrasound will guide the needle to the right place. (Ultrasound uses sound waves to create pictures of the body on a video screen. You will not feel the sound waves.)    * A biopsy removes tissue or other cells from the body; we send the tissue or cells to a lab for testing.  * Paracentesis removes fluid from the belly (abdomen) to relieve pressure, to test the fluid or both.  * Thoracentesis removes fluid from the sac around the lungs to relieve pressure, to test the fluid or both.  * Aspiration removes fluid from any part of the body, then the fluid is tested for disease or infection.    Who should I call with questions: If you have any questions, please call the Imaging Department where you will have your exam. Directions, parking instructions, and other information are available on our website, [email or web address].     Nov 26, 2021 12:30 PM  LAB with [facility]  [place] ([facility] ) [address]  [facility] [id number] [phone]   Please do not eat 10-12 hours before your appointment if you are coming in fasting for labs on lipids, cholesterol, or glucose (sugar). Does not apply to pregnant women. Water, tea and black coffee (with nothing added) is okay. Do not drink other fluids, diet soda or gum. If you have concerns about taking your medications, please send a message by clicking on Secure Messaging, Message Your Care Team.     Nov 26, 2021  2:00 PM  (Arrive by 1:00 PM)  CT CHEST/ABDOMEN/PELVIS W CONTRAST with HICT1  HI CT SCAN ([facility] ) [address] [phone]   How do I prepare for my exam? (Food and drink instructions)  **You will have contrast for this exam.**  No Food and Drink Restrictions    How do I prepare for my exam? (Other instructions)  Please arrive 30 minutes early for your CT.  Once in the department you might be asked to drink water 15-20 minutes prior to your exam.  If indicated you may be asked to drink an oral contrast in advance of your CT.  If this is the case, the imaging team will let you know or be in contact with you prior to your appointment    If you have diabetes:  Continue to take your metformin medication on the day of your exam    What should I wear: Please wear loose clothing, such as a sweat suit or jogging clothes. Avoid snaps, zippers and other metal. We may ask you to undress and put on a hospital gown.    How long does the exam take: Most scans take less than 20 minutes.    What should I bring: Please bring any scans or X-rays taken at other hospitals, if similar tests were done. It is safest to leave personal items at home.    Do I need a : No  is needed.    What do I need to tell my doctor?  Be sure to tell your doctor:  * If you have any allergies.  * If there's any chance you are pregnant.  * If you are breastfeeding.    What  should I do after the exam: No restrictions, You may resume normal activities.    What is this test: A CT (computed tomography) scan is a series of pictures that allows us to look inside your body. The scanner creates images of the body in cross sections, much like slices of bread. This helps us see any problems more clearly. You may receive contrast (X-ray dye) before or during your scan. You will be asked to drink the contrast.    Who should I call with questions: If you have any questions, please call the Imaging Department where you will have your exam. Directions, parking instructions, and other information is available on our website, [email or web address].     Nov 30, 2021  3:00 PM  (Arrive by 2:45 PM)  Return Visit with Maryana Rodrigues NP  [facility] ([facility] ) [address] [place] [address]  [phone]         Care Instructions       Further instructions from your care team           DISCHARGE INSTRUCTIONS  Paracentesis      IMPORTANT: As you prepare for discharge, the following information will help you return to your best level of health.       This Information Is About Your Follow Up Care  Call your doctor if you do not get better. Call sooner if you feel worse. You can reach your doctor by calling their clinic phone number.                                    This Information Is About Procedures      PARACENTESIS  This procedure involved the insertion of a needle into the space between your abdominal organs and the membrane that surrounds them (peritoneal space). It was done to get a sample of the fluid or to remove excess fluid.    Call your doctor if you have:    any drainage from the procedure site.    any redness, swelling, pain or pus around the procedure site.    swelling in your scrotum.    any new or severe symptoms.                        This Information Is About Your Illness and Diagnosis    ASCITES    Ascites is extra fluid in your abdomen. The  "lining inside your abdomen is called the \"peritoneum\". The peritoneum is made up of two thin layers. One layer is attached to the wall of the abdomen. The other layer is wrapped around the abdominal organs.  These layers usually slide right next to each other, without extra fluid. With ascites, fluid builds up in the space between these layers.    Causes of ascites may include:    Cirrhosis of the liver (most common cause)    A blood clot in the vein of the liver    Congestive heart failure    Inflammation of the sac around the heart    Chronic hepatitis    Pancreatitis    Liver cancer    Nephrotic syndrome    Poor nutrition    Cancer in the abdomen    Chronic hemodialysis    Infection in the abdomen    When you go home, follow these instructions:    Take your medications as scheduled.    Follow a low sodium diet if it is ordered for you.     Try using herbs and other flavorings, like lemon juice, to season your food.    Avoid canned and prepackaged foods.    Do not add salt to any of your food, either while cooking or while eating.    Avoid salty snacks like chips, nuts, and crackers.  Try fresh fruit or vegetables instead.    Follow instructions given to you by your dietician at the hospital.    Sleep with your head elevated (on several pillows or with wood blocks under the head of the bed) if breathing is hard for you when lying flat.    Eat small frequent meals if you get full easily.    Keep follow up appointments with your doctor.     Call your doctor if:    You notice your clothes are getting tighter around the waist.    Your weight is increasing.    You are more short of breath than usual.    You are having more discomfort in your abdomen.    You have trouble following your diet.    You have any other questions or concerns.                                  Additional Information About Your Visit       [facility] Information    [email or web address] lets you send messages to your doctor, view your test results, renew your " "prescriptions, schedule appointments and more. To sign up, go to [email or web address] . Click on \"Log in\" on the left side of the screen, which will take you to the Welcome page. Then click on \"Sign up Now\" on the right side of the page.     You will be asked to enter the access code listed below, as well as some personal information. Please follow the directions to create your username and password.     Your access code is: [email or web address]  Expires: 2021 12:49 PM     Your access code will  in 60 days. If you need help or a new code, please call your   [facility] clinic or [phone].       Care EveryWhere ID    This is your Care EveryWhere ID. This could be used by other organizations to access your [facility] medical records  [id number]       Your Vitals Were  Most recent update: 2021 11:33 AM    Blood Pressure   121/58 (BP Location: Left arm)    Pulse   77    Respirations   18    Pulse Oximetry   100%           Primary Care Provider Office Phone # Fax #    WILIAM Velasco [phone]      Equal Access to Services    AMINAH ARAYA AH: Hadii mitch harding hadasho Soomaali, waaxda luqadaha, qaybta kaalmada adeegyada, ramos tillman. So [facility] [phone].    ATENCIÓN: Si habla español, tiene a diop disposición servicios gratuitos de asistencia lingüística. Masood al [phone].    We comply with applicable federal and state civil rights laws, including the [place] Human Rights Act. We do not discriminate on the basis of race, color, creed, Latter day, national origin, marital status, age, disability, sex, sexual orientation, or gender identity.    If you would like an itemization of your charges they will now be available in [employer]harmYwindow 30 days after discharge. To access the itemized statements in [employer]harmYwindow go to billing/billing summary. From there select view account. There will be multiple tabs showing an overview of your account, detail, payments, and " communications. From the communications tab you can see your monthly statements, your itemized statements, and any billing letters generated for your account. If you do not have a [employer] account and need help getting access please contact [employer] at [phone].  If you would prefer to have your itemized statements mailed please contact our automated itemized bill request line at [phone] option  2.       Thank you!    Thank you for choosing [facility] for your care. Our goal is always to provide you with excellent care. Hearing back from our patients is one way we can continue to improve our services. Please take a few minutes to complete the written survey that you may receive in the mail after you visit with us. Thank you!         Medication List     Notice    This visit is during an admission. Changes to the med list made in this visit will be reflected in the After Visit Summary of the admission.

## 2021-08-09 NOTE — DISCHARGE INSTRUCTIONS
Discharge Instructions for Paracentesis  Paracentesis is a procedure to remove extra fluid from your belly (abdomen). This fluid buildup in the abdomen is called ascites. The procedure may have been done to take a sample of the fluid. Or, it may have been done to drain the extra fluid from your abdomen and help make you more comfortable.      Ascites is buildup of excess fluid in the abdomen.   Home care    If you have pain after the procedure, your healthcare provider can prescribe or recommend pain medicines. Take these exactly as directed. If you stopped taking other medicines before the procedure, ask your provider when you can start them again.    Take it easy for 24 hours after the procedure. Don't do any physical activity until your provider says it s OK.    You will have a small bandage over the puncture site. Stitches, surgical staples, adhesive tapes, adhesive strips, or surgical glue may be used to close the incision. They also help stop bleeding and speed healing. You may take the bandage off in 24 hours.    Check the puncture site for the signs of infection listed below.    Follow-up care  Make a follow-up appointment with your healthcare provider as directed. During your follow-up visit, your provider will check your healing. Let your provider know how you are feeling. You can also discuss the cause of your ascites and if you need any further treatment. If your fluid is infected, you will be sent home on antibiotics. In some cases, the paracentesis may need to be repeated if the fluid returns. Your provider may also prescribe medicines that increase urination (diuretics) to decrease the buildup of fluid.   When to call your healthcare provider  Call your healthcare provider if you have any of the following after the procedure:    A fever of 100.4  F ( 38.0 C) or higher, or as directed by your provider    Chills    Trouble breathing    Pain that doesn't go away even after taking pain medicine    Belly  pain not caused by having the skin punctured    Bleeding from the puncture site    More than a small amount of fluid leaking from the puncture site    Swollen belly    Signs of infection at the puncture site. These include increased pain, redness, or swelling, warmth, or bad-smelling drainage.    Blood in your urine    Feeling dizzy or lightheaded, or fainting  Marisol last reviewed this educational content on 10/1/2019    2420-4328 [employer]. All rights reserved. This information is not intended as a substitute for professional medical care. Always follow your healthcare professional's instructions.

## 2021-08-09 NOTE — PROGRESS NOTES
Procedure: US Paracentesis, right    There were  no complications and patient has no symptoms..      Tolerated procedure well.    Patient to US for Paracentesis.  Positioned supine on table.  Draining cloudy yellow fluid.  Fluid will not  be sent to lab. 4900 ml total fld drained.   Pt tolerated the procedure well.      Discharge instructions given to pt.    Radiologist:Dr Armstrong    Time Out: Prior to the start of the procedure and with procedural staff participation, I verbally confirmed the patient s identity using two indicators, relevant allergies, that the procedure was appropriate and matched the consent or emergent situation, and that the correct equipment/implants were available. Immediately prior to starting the procedure I conducted the Time Out with the procedural staff and re-confirmed the patient s name, procedure, and site/side. (The Joint Commission universal protocol was followed.)  Yes    Position:  supine    Pain:  0    Sedation:None. Local Anesthestic used  No sedation    Estimated Blood Loss: None     Condition: Stable    Comments: See dictated procedure note for full details     KERA NEVES RN

## 2021-08-09 NOTE — IP AVS SNAPSHOT
After Visit Summary Template Not Found    This Print Group is only intended to be used in the After Visit Summary and can only be used in a report that uses a released After Visit Summary Template.                       MRN:[id number]                      After Visit Summary   8/9/2021    Nidhi Miguel    MRN: [id number]           Visit Information        Provider Department      8/9/2021 11:30 AM HIIRRAD; HIXRRN; HIUS1 HI ULTRASOUND           Review of your medicines      UNREVIEWED medicines. Ask your doctor about these medicines       Dose / Directions   * albuterol (2.5 MG/3ML) 0.083% neb solution  Commonly known as: PROVENTIL      Dose: 2.5 mg  Inhale 2.5 mg into the lungs 4 times daily as needed  Refills: 0     * albuterol 108 (90 Base) MCG/ACT inhaler  Commonly known as: PROAIR HFA/PROVENTIL HFA/VENTOLIN HFA  Used for: Intermittent asthma without complication, unspecified asthma severity      Dose: 2 puff  Inhale 2 puffs into the lungs every 4 hours as needed  Quantity: 1 Inhaler  Refills: 11     furosemide 20 MG tablet  Commonly known as: LASIX      Dose: 40 mg  Take 40 mg by mouth daily  Refills: 0     ipratropium - albuterol 0.5 mg/2.5 mg/3 mL 0.5-2.5 (3) MG/3ML neb solution  Commonly known as: DUONEB  Used for: Intermittent asthma without complication, unspecified asthma severity      Dose: 3 mL  Inhale 1 vial (3 mLs) into the lungs 4 times daily as needed  Quantity: 360 mL  Refills: 11     spironolactone 50 MG tablet  Commonly known as: ALDACTONE      Dose: 25 mg  Take 25 mg by mouth daily  Refills: 0     Symbicort 160-4.5 MCG/ACT Inhaler  Used for: Intermittent asthma without complication, unspecified asthma severity  Generic drug: budesonide-formoterol      USE 2 PUFFS INTO THE LUNGS TWICE DAILY  Quantity: 10.2 g  Refills: 0         * This list has 2 medication(s) that are the same as other medications prescribed for you. Read the directions carefully, and ask your doctor or other care provider to  review them with you.                  Protect others around you: Learn how to safely use, store and throw away your medicines at www.disposemymeds.org.       Follow-ups after your visit       Your next 10 appointments already scheduled    Aug 16, 2021 11:30 AM  (Arrive by 11:15 AM)  US PARACENTESIS with HIUS1, HIXRRN, HIIRRAD  HI ULTRASOUND ([facility] ) [address] [place] [address]  [phone]   How do I prepare for my exam? (Food and drink instructions)  No Food and Drink Restrictions.    How do I prepare for my exam? (Other instructions)  IF YOUR DOCTOR ALSO PRESCRIBED SEDATION DURING THE EXAM (medicine to help you relax): You will receive separate instructions about driving, eating, and additional tests that may be necessary prior to your exam day.    What should I wear: Wear comfortable clothes.    How long does the exam take: Aspiration (no sedation treatment takes about an hour).  For paracentesis, thoracentesis or sedation plan to spend at least three hours at the hospital.    What should I bring: Bring a list of your medicines, including vitamins, minerals and over-the-counter drugs. It is safest to leave personal items at home. Bring your 's license or photo ID and your insurance card.    Do I need a :  No  is needed.    What do I need to tell my doctor:  Tell your doctor in advance:  * If you are or may be pregnant.  * If you are taking Coumadin (or any other blood thinners) 5 days prior to the exam for any special instructions.  * If you are diabetic to determine if your insulin needs have to be adjusted for the exam.    What should I do after the exam: Take it easy the rest of the day. You can return to normal activities the next day.    What is this test: This test uses a long, thin needle or tube to remove tissue, fluid, or other cells from your body. Pictures from an ultrasound will guide the needle to the right place. (Ultrasound uses sound  waves to create pictures of the body on a video screen. You will not feel the sound waves.)    * A biopsy removes tissue or other cells from the body; we send the tissue or cells to a lab for testing.  * Paracentesis removes fluid from the belly (abdomen) to relieve pressure, to test the fluid or both.  * Thoracentesis removes fluid from the sac around the lungs to relieve pressure, to test the fluid or both.  * Aspiration removes fluid from any part of the body, then the fluid is tested for disease or infection.    Who should I call with questions: If you have any questions, please call the Imaging Department where you will have your exam. Directions, parking instructions, and other information are available on our website, [email or web address].     Nov 26, 2021 12:30 PM  LAB with  LAB  [facility] ([facility] ) [address] [facility] [address] [phone]   Please do not eat 10-12 hours before your appointment if you are coming in fasting for labs on lipids, cholesterol, or glucose (sugar). Does not apply to pregnant women. Water, tea and black coffee (with nothing added) is okay. Do not drink other fluids, diet soda or gum. If you have concerns about taking your medications, please send a message by clicking on Secure Messaging, Message Your Care Team.     Nov 26, 2021  2:00 PM  (Arrive by 1:00 PM)  CT CHEST/ABDOMEN/PELVIS W CONTRAST with HICT1  HI CT SCAN ([facility] ) [address] [id number] [phone]   How do I prepare for my exam? (Food and drink instructions)  **You will have contrast for this exam.**  No Food and Drink Restrictions    How do I prepare for my exam? (Other instructions)  Please arrive 30 minutes early for your CT.  Once in the department you might be asked to drink water 15-20 minutes prior to your exam.  If indicated you may be asked to drink an oral contrast in advance of your CT.  If this  is the case, the imaging team will let you know or be in contact with you prior to your appointment    If you have diabetes:  Continue to take your metformin medication on the day of your exam    What should I wear: Please wear loose clothing, such as a sweat suit or jogging clothes. Avoid snaps, zippers and other metal. We may ask you to undress and put on a hospital gown.    How long does the exam take: Most scans take less than 20 minutes.    What should I bring: Please bring any scans or X-rays taken at other hospitals, if similar tests were done. It is safest to leave personal items at home.    Do I need a : No  is needed.    What do I need to tell my doctor?  Be sure to tell your doctor:  * If you have any allergies.  * If there's any chance you are pregnant.  * If you are breastfeeding.    What should I do after the exam: No restrictions, You may resume normal activities.    What is this test: A CT (computed tomography) scan is a series of pictures that allows us to look inside your body. The scanner creates images of the body in cross sections, much like slices of bread. This helps us see any problems more clearly. You may receive contrast (X-ray dye) before or during your scan. You will be asked to drink the contrast.    Who should I call with questions: If you have any questions, please call the Imaging Department where you will have your exam. Directions, parking instructions, and other information is available on our website, [email or web address].     Nov 30, 2021  3:00 PM  (Arrive by 2:45 PM)  Return Visit with Maryana Rodrigues NP  [facility] ([facility] ) [address] [place] [address]  [phone]         Care Instructions       Further instructions from your care team         Discharge Instructions for Paracentesis  Paracentesis is a procedure to remove extra fluid from your belly (abdomen). This fluid buildup in the abdomen is called ascites.  The procedure may have been done to take a sample of the fluid. Or, it may have been done to drain the extra fluid from your abdomen and help make you more comfortable.      Ascites is buildup of excess fluid in the abdomen.   Home care    If you have pain after the procedure, your healthcare provider can prescribe or recommend pain medicines. Take these exactly as directed. If you stopped taking other medicines before the procedure, ask your provider when you can start them again.    Take it easy for 24 hours after the procedure. Don't do any physical activity until your provider says it s OK.    You will have a small bandage over the puncture site. Stitches, surgical staples, adhesive tapes, adhesive strips, or surgical glue may be used to close the incision. They also help stop bleeding and speed healing. You may take the bandage off in 24 hours.    Check the puncture site for the signs of infection listed below.    Follow-up care  Make a follow-up appointment with your healthcare provider as directed. During your follow-up visit, your provider will check your healing. Let your provider know how you are feeling. You can also discuss the cause of your ascites and if you need any further treatment. If your fluid is infected, you will be sent home on antibiotics. In some cases, the paracentesis may need to be repeated if the fluid returns. Your provider may also prescribe medicines that increase urination (diuretics) to decrease the buildup of fluid.   When to call your healthcare provider  Call your healthcare provider if you have any of the following after the procedure:    A fever of 100.4  F ( 38.0 C) or higher, or as directed by your provider    Chills    Trouble breathing    Pain that doesn't go away even after taking pain medicine    Belly pain not caused by having the skin punctured    Bleeding from the puncture site    More than a small amount of fluid leaking from the puncture site    Swollen  "belly    Signs of infection at the puncture site. These include increased pain, redness, or swelling, warmth, or bad-smelling drainage.    Blood in your urine    Feeling dizzy or lightheaded, or fainting  [employer] last reviewed this educational content on 10/1/2019    9721-8874 [employer]. All rights reserved. This information is not intended as a substitute for professional medical care. Always follow your healthcare professional's instructions.          Additional Information About Your Visit       MyChart Information    [employer] lets you send messages to your doctor, view your test results, renew your prescriptions, schedule appointments and more. To sign up, go to [email or web address] . Click on \"Log in\" on the left side of the screen, which will take you to the Welcome page. Then click on \"Sign up Now\" on the right side of the page.     You will be asked to enter the access code listed below, as well as some personal information. Please follow the directions to create your username and password.     Your access code is: [email or web address]  Expires: 2021 12:49 PM     Your access code will  in 60 days. If you need help or a new code, please call your   [facility] clinic or [phone].       Care EveryWhere ID    This is your Care EveryWhere ID. This could be used by other organizations to access your [place] [facility] medical records  [id number]        Primary Care Provider Office Phone # Fax #    WILIAM Velasco [phone]      Equal Access to Services    [facility] AH: Hadii aad ku hadasho Soomaali, waaxda luqadaha, qaybta kaalmada adeegyada, ramos tillman. So [facility] [phone].    ATENCIÓN: Si habla español, tiene a diop disposición servicios gratuitos de asistencia lingüística. Llame al [phone].    We comply with applicable federal and state civil rights laws, including the [place] Human Rights Act. We do not discriminate on " the basis of race, color, creed, Druze, national origin, marital status, age, disability, sex, sexual orientation, or gender identity.    If you would like an itemization of your charges they will now be available in MST 30 days after discharge. To access the itemized statements in MST go to billing/billing summary. From there select view account. There will be multiple tabs showing an overview of your account, detail, payments, and communications. From the communications tab you can see your monthly statements, your itemized statements, and any billing letters generated for your account. If you do not have a MST account and need help getting access please contact MST support at [phone].  If you would prefer to have your itemized statements mailed please contact our automated itemized bill request line at [phone] option  2.       Thank you!    Thank you for choosing [facility] for your care. Our goal is always to provide you with excellent care. Hearing back from our patients is one way we can continue to improve our services. Please take a few minutes to complete the written survey that you may receive in the mail after you visit with us. Thank you!            Medication List      ASK your doctor about these medications          Morning Afternoon Evening Bedtime As Needed    * albuterol (2.5 MG/3ML) 0.083% neb solution  Also known as: PROVENTIL  INSTRUCTIONS: Inhale 2.5 mg into the lungs 4 times daily as needed                     * albuterol 108 (90 Base) MCG/ACT inhaler  Also known as: PROAIR HFA/PROVENTIL HFA/VENTOLIN HFA  INSTRUCTIONS: Inhale 2 puffs into the lungs every 4 hours as needed  Doctor's comments: Pharmacy may dispense brand covered by insurance (Proair, or proventil or ventolin or generic albuterol inhaler)                     furosemide 20 MG tablet  Also known as: LASIX  INSTRUCTIONS: Take 40 mg by mouth daily                     ipratropium - albuterol 0.5 mg/2.5 mg/3  mL 0.5-2.5 (3) MG/3ML neb solution  Also known as: DUONEB  INSTRUCTIONS: Inhale 1 vial (3 mLs) into the lungs 4 times daily as needed                     spironolactone 50 MG tablet  Also known as: ALDACTONE  INSTRUCTIONS: Take 25 mg by mouth daily                     Symbicort 160-4.5 MCG/ACT Inhaler  INSTRUCTIONS: USE 2 PUFFS INTO THE LUNGS TWICE DAILY  Generic drug: budesonide-formoterol                        * This list has 2 medication(s) that are the same as other medications prescribed for you. Read the directions carefully, and ask your doctor or other care provider to review them with you.

## 2021-08-09 NOTE — IP AVS SNAPSHOT
HI ULTRASOUND  [address] [place] [address]  Phone: [phone]                                    After Visit Summary   8/9/2021    Nidhi Miguel    MRN: [id number]           After Visit Summary Signature Page    I have received my discharge instructions, and my questions have been answered. I have discussed any challenges I see with this plan with the nurse or doctor.    ..........................................................................................................................................  Patient/Patient Representative Signature      ..........................................................................................................................................  Patient Representative Print Name and Relationship to Patient    ..................................................               ................................................  Date                                   Time    ..........................................................................................................................................  Reviewed by Signature/Title    ...................................................              ..............................................  Date                                               Time          22EPIC Rev 08/18

## 2021-08-16 NOTE — PROGRESS NOTES
Procedure: US Paracentesis, right    There were  no complications and patient has no symptoms..      Tolerated procedure well.    Patient to US for Paracentesis.  Positioned supine on table.  Draining cloudy yellow fluid.  Fluid will not be sent to lab. 6100 ml total fld drained.   Pt tolerated the procedure well.      Discharge instructions given to pt.    Radiologist:Dr Armstrong    Time Out: Prior to the start of the procedure and with procedural staff participation, I verbally confirmed the patient s identity using two indicators, relevant allergies, that the procedure was appropriate and matched the consent or emergent situation, and that the correct equipment/implants were available. Immediately prior to starting the procedure I conducted the Time Out with the procedural staff and re-confirmed the patient s name, procedure, and site/side. (The Joint Commission universal protocol was followed.)  Yes    Position:  supine    Pain:  0    Sedation:None. Local Anesthestic used  No sedation    Estimated Blood Loss: None     Condition: Stable    Comments: See dictated procedure note for full details     KERA NEVES RN

## 2021-08-16 NOTE — DISCHARGE INSTRUCTIONS
Discharge Instructions for Paracentesis  Paracentesis is a procedure to remove extra fluid from your belly (abdomen). This fluid buildup in the abdomen is called ascites. The procedure may have been done to take a sample of the fluid. Or, it may have been done to drain the extra fluid from your abdomen and help make you more comfortable.      Ascites is buildup of excess fluid in the abdomen.   Home care    If you have pain after the procedure, your healthcare provider can prescribe or recommend pain medicines. Take these exactly as directed. If you stopped taking other medicines before the procedure, ask your provider when you can start them again.    Take it easy for 24 hours after the procedure. Don't do any physical activity until your provider says it s OK.    You will have a small bandage over the puncture site. Stitches, surgical staples, adhesive tapes, adhesive strips, or surgical glue may be used to close the incision. They also help stop bleeding and speed healing. You may take the bandage off in 24 hours.    Check the puncture site for the signs of infection listed below.    Follow-up care  Make a follow-up appointment with your healthcare provider as directed. During your follow-up visit, your provider will check your healing. Let your provider know how you are feeling. You can also discuss the cause of your ascites and if you need any further treatment. If your fluid is infected, you will be sent home on antibiotics. In some cases, the paracentesis may need to be repeated if the fluid returns. Your provider may also prescribe medicines that increase urination (diuretics) to decrease the buildup of fluid.   When to call your healthcare provider  Call your healthcare provider if you have any of the following after the procedure:    A fever of 100.4  F ( 38.0 C) or higher, or as directed by your provider    Chills    Trouble breathing    Pain that doesn't go away even after taking pain medicine    Belly  pain not caused by having the skin punctured    Bleeding from the puncture site    More than a small amount of fluid leaking from the puncture site    Swollen belly    Signs of infection at the puncture site. These include increased pain, redness, or swelling, warmth, or bad-smelling drainage.    Blood in your urine    Feeling dizzy or lightheaded, or fainting  [employer] last reviewed this educational content on 10/1/2019    3441-8512 [employer]. All rights reserved. This information is not intended as a substitute for professional medical care. Always follow your healthcare professional's instructions.

## 2021-08-16 NOTE — IP AVS SNAPSHOT
HI ULTRASOUND  [address] [place] [address]  Phone: [phone]                                    After Visit Summary   8/16/2021    Nidhi Miguel    MRN: [id number]           After Visit Summary Signature Page    I have received my discharge instructions, and my questions have been answered. I have discussed any challenges I see with this plan with the nurse or doctor.    ..........................................................................................................................................  Patient/Patient Representative Signature      ..........................................................................................................................................  Patient Representative Print Name and Relationship to Patient    ..................................................               ................................................  Date                                   Time    ..........................................................................................................................................  Reviewed by Signature/Title    ...................................................              ..............................................  Date                                               Time          22EPIC Rev 08/18

## 2021-08-24 NOTE — PROGRESS NOTES
Procedure: US Paracentesis, NA    There were  no complications and patient has no symptoms..      Tolerated procedure well.    Patient to US for Paracentesis.  Positioned supine on table.  Draining clear yellow fluid.  Fluid will not be sent to lab. 5950 ml total fld drained.   Pt tolerated the procedure well.      Discharge instructions given to pt.

## 2021-08-24 NOTE — IP AVS SNAPSHOT
After Visit Summary Template Not Found    This Print Group is only intended to be used in the After Visit Summary and can only be used in a report that uses a released After Visit Summary Template.                       MRN:[id number]                      After Visit Summary   8/24/2021    Nidhi Miguel    MRN: [id number]           Visit Information        Provider Department      8/24/2021 10:45 AM HIIRRAD; HIXRRN; HIUS1 HI ULTRASOUND           Review of your medicines      Notice    This visit is during an admission. Changes to the med list made in this visit will be reflected in the After Visit Summary of the admission.           Protect others around you: Learn how to safely use, store and throw away your medicines at www.disposemymeds.org.       Follow-ups after your visit       Your next 10 appointments already scheduled    Aug 31, 2021  8:00 AM  (Arrive by 7:45 AM)  US ABDOMEN LIMITED with HIUS1  HI ULTRASOUND ([facility] ) [address] [place] [address]  [phone]   How do I prepare for my exam? (Food and drink instructions)  No eating, smoking, gum chewing or drinking for 8 hours before the exam. You may take medicine with a small sip of water.    What should I wear: Wear comfortable clothes.    How long does the exam take: Most ultrasounds take 30 to 60 minutes.    What should I bring: It is safest to leave personal items at home. Bring your 's license or photo ID and your insurance card. If your doctor has given you a paper order for your exam, please bring it with you to your appointment.    Do I need a :  No  is needed.    What do I need to tell my doctor: Tell your doctor about any allergies you may have.    What should I do after the exam: No restrictions, you may resume normal activities.    What is this test: An ultrasound uses sound waves to make pictures of the body. Sound waves do not cause pain. The only discomfort may be the pressure of the  camera against your skin or full bladder.    Who should I call with questions: If you have any questions, please call the Imaging Department where you will have your exam. Directions, parking instructions, and other information are available on our website, [email or web address].     Nov 26, 2021 12:30 PM  LAB with  LAB  [facility] ([facility] ) [address]  [facility] [id number] [phone]   Please do not eat 10-12 hours before your appointment if you are coming in fasting for labs on lipids, cholesterol, or glucose (sugar). Does not apply to pregnant women. Water, tea and black coffee (with nothing added) is okay. Do not drink other fluids, diet soda or gum. If you have concerns about taking your medications, please send a message by clicking on Secure Messaging, Message Your Care Team.     Nov 26, 2021  2:00 PM  (Arrive by 1:00 PM)  CT CHEST/ABDOMEN/PELVIS W CONTRAST with HICT1  HI CT SCAN ([facility] ) [address] [id number] [phone]   How do I prepare for my exam? (Food and drink instructions)  **You will have contrast for this exam.**  No Food and Drink Restrictions    How do I prepare for my exam? (Other instructions)  Please arrive 30 minutes early for your CT.  Once in the department you might be asked to drink water 15-20 minutes prior to your exam.  If indicated you may be asked to drink an oral contrast in advance of your CT.  If this is the case, the imaging team will let you know or be in contact with you prior to your appointment    If you have diabetes:  Continue to take your metformin medication on the day of your exam    What should I wear: Please wear loose clothing, such as a sweat suit or jogging clothes. Avoid snaps, zippers and other metal. We may ask you to undress and put on a hospital gown.    How long does the exam take: Most scans take less than 20 minutes.    What should I bring: Please  bring any scans or X-rays taken at other hospitals, if similar tests were done. It is safest to leave personal items at home.    Do I need a : No  is needed.    What do I need to tell my doctor?  Be sure to tell your doctor:  * If you have any allergies.  * If there's any chance you are pregnant.  * If you are breastfeeding.    What should I do after the exam: No restrictions, You may resume normal activities.    What is this test: A CT (computed tomography) scan is a series of pictures that allows us to look inside your body. The scanner creates images of the body in cross sections, much like slices of bread. This helps us see any problems more clearly. You may receive contrast (X-ray dye) before or during your scan. You will be asked to drink the contrast.    Who should I call with questions: If you have any questions, please call the Imaging Department where you will have your exam. Directions, parking instructions, and other information is available on our website, [email or web address].     Nov 30, 2021  3:00 PM  (Arrive by 2:45 PM)  Return Visit with Maryana Rodrigues NP  [facility] ([facility] ) [address] [place] [address]  [phone]         Care Instructions       Further instructions from your care team           DISCHARGE INSTRUCTIONS  Paracentesis      IMPORTANT: As you prepare for discharge, the following information will help you return to your best level of health.       This Information Is About Your Follow Up Care  Call your doctor if you do not get better. Call sooner if you feel worse. You can reach your doctor by calling their clinic phone number.                                    This Information Is About Procedures      PARACENTESIS  This procedure involved the insertion of a needle into the space between your abdominal organs and the membrane that surrounds them (peritoneal space). It was done to get a sample of the fluid or to remove  "excess fluid.    Call your doctor if you have:    any drainage from the procedure site.    any redness, swelling, pain or pus around the procedure site.    swelling in your scrotum.    any new or severe symptoms.                        This Information Is About Your Illness and Diagnosis    ASCITES    Ascites is extra fluid in your abdomen. The lining inside your abdomen is called the \"peritoneum\". The peritoneum is made up of two thin layers. One layer is attached to the wall of the abdomen. The other layer is wrapped around the abdominal organs.  These layers usually slide right next to each other, without extra fluid. With ascites, fluid builds up in the space between these layers.    Causes of ascites may include:    Cirrhosis of the liver (most common cause)    A blood clot in the vein of the liver    Congestive heart failure    Inflammation of the sac around the heart    Chronic hepatitis    Pancreatitis    Liver cancer    Nephrotic syndrome    Poor nutrition    Cancer in the abdomen    Chronic hemodialysis    Infection in the abdomen    When you go home, follow these instructions:    Take your medications as scheduled.    Follow a low sodium diet if it is ordered for you.     Try using herbs and other flavorings, like lemon juice, to season your food.    Avoid canned and prepackaged foods.    Do not add salt to any of your food, either while cooking or while eating.    Avoid salty snacks like chips, nuts, and crackers.  Try fresh fruit or vegetables instead.    Follow instructions given to you by your dietician at the hospital.    Sleep with your head elevated (on several pillows or with wood blocks under the head of the bed) if breathing is hard for you when lying flat.    Eat small frequent meals if you get full easily.    Keep follow up appointments with your doctor.     Call your doctor if:    You notice your clothes are getting tighter around the waist.    Your weight is increasing.    You are more short " "of breath than usual.    You are having more discomfort in your abdomen.    You have trouble following your diet.    You have any other questions or concerns.                                  Additional Information About Your Visit       [employer] Information    [employer] lets you send messages to your doctor, view your test results, renew your prescriptions, schedule appointments and more. To sign up, go to [email or web address] . Click on \"Log in\" on the left side of the screen, which will take you to the Welcome page. Then click on \"Sign up Now\" on the right side of the page.     You will be asked to enter the access code listed below, as well as some personal information. Please follow the directions to create your username and password.     Your access code is: [email or web address]  Expires: 2021 12:49 PM     Your access code will  in 60 days. If you need help or a new code, please call your   [facility] clinic or [phone].       Care EveryWhere ID    This is your Care EveryWhere ID. This could be used by other organizations to access your [facility] medical records  [id number]       Your Vitals Were  Most recent update: 2021 11:58 AM    Blood Pressure   111/68    Respirations   20    Pulse Oximetry   100%            Primary Care Provider Office Phone # Fax #    WILIAM Velasco [phone]      Equal Access to Services    [facility]: Hadii aad ku hadasho Soomaali, waaxda luqadaha, qaybta kaalmada adeegyada, ramos valdivia hayhumphrey choi . So [facility] [phone].    ATENCIÓN: Si habla español, tiene a diop disposición servicios gratuitos de asistencia lingüística. Masood al [phone].    We comply with applicable federal and state civil rights laws, including the [place] Human Rights Act. We do not discriminate on the basis of race, color, creed, Jainism, national origin, marital status, age, disability, sex, sexual orientation, or gender identity.    If " you would like an itemization of your charges they will now be available in [employer] 30 days after discharge. To access the itemized statements in [employer] go to billing/billing summary. From there select view account. There will be multiple tabs showing an overview of your account, detail, payments, and communications. From the communications tab you can see your monthly statements, your itemized statements, and any billing letters generated for your account. If you do not have a [employer] account and need help getting access please contact [employer] support at [phone].  If you would prefer to have your itemized statements mailed please contact our automated itemized bill request line at [phone] option  2.       Thank you!    Thank you for choosing [facility] for your care. Our goal is always to provide you with excellent care. Hearing back from our patients is one way we can continue to improve our services. Please take a few minutes to complete the written survey that you may receive in the mail after you visit with us. Thank you!         Medication List     Notice    This visit is during an admission. Changes to the med list made in this visit will be reflected in the After Visit Summary of the admission.

## 2021-08-24 NOTE — IP AVS SNAPSHOT
HI ULTRASOUND  [address] [place] [address]  Phone: [phone]                                    After Visit Summary   8/24/2021    Nidhi Miguel    MRN: [id number]           After Visit Summary Signature Page    I have received my discharge instructions, and my questions have been answered. I have discussed any challenges I see with this plan with the nurse or doctor.    ..........................................................................................................................................  Patient/Patient Representative Signature      ..........................................................................................................................................  Patient Representative Print Name and Relationship to Patient    ..................................................               ................................................  Date                                   Time    ..........................................................................................................................................  Reviewed by Signature/Title    ...................................................              ..............................................  Date                                               Time          22EPIC Rev 08/18

## 2021-09-02 NOTE — IP AVS SNAPSHOT
After Visit Summary Template Not Found    This Print Group is only intended to be used in the After Visit Summary and can only be used in a report that uses a released After Visit Summary Template.                       MRN:[id number]                      After Visit Summary   9/2/2021    Nidhi Miguel    MRN: [id number]           Visit Information        Provider Department      9/2/2021 11:30 AM HIIRRAD; HIXRRN; HIUS1 HI ULTRASOUND           Review of your medicines      Notice    This visit is during an admission. Changes to the med list made in this visit will be reflected in the After Visit Summary of the admission.           Protect others around you: Learn how to safely use, store and throw away your medicines at www.disposemymeds.org.       Follow-ups after your visit       Your next 10 appointments already scheduled    Sep 02, 2021 11:30 AM  (Arrive by 11:15 AM)  US PARACENTESIS with HIUS1, HIXRRN, HIIRRAD  HI ULTRASOUND ([facility] ) [address] [place] [address]  [phone]   How do I prepare for my exam? (Food and drink instructions)  No Food and Drink Restrictions.    How do I prepare for my exam? (Other instructions)  IF YOUR DOCTOR ALSO PRESCRIBED SEDATION DURING THE EXAM (medicine to help you relax): You will receive separate instructions about driving, eating, and additional tests that may be necessary prior to your exam day.    What should I wear: Wear comfortable clothes.    How long does the exam take: Aspiration (no sedation treatment takes about an hour).  For paracentesis, thoracentesis or sedation plan to spend at least three hours at the hospital.    What should I bring: Bring a list of your medicines, including vitamins, minerals and over-the-counter drugs. It is safest to leave personal items at home. Bring your 's license or photo ID and your insurance card.    Do I need a :  No  is needed.    What do I need to tell my doctor:  Tell  your doctor in advance:  * If you are or may be pregnant.  * If you are taking Coumadin (or any other blood thinners) 5 days prior to the exam for any special instructions.  * If you are diabetic to determine if your insulin needs have to be adjusted for the exam.    What should I do after the exam: Take it easy the rest of the day. You can return to normal activities the next day.    What is this test: This test uses a long, thin needle or tube to remove tissue, fluid, or other cells from your body. Pictures from an ultrasound will guide the needle to the right place. (Ultrasound uses sound waves to create pictures of the body on a video screen. You will not feel the sound waves.)    * A biopsy removes tissue or other cells from the body; we send the tissue or cells to a lab for testing.  * Paracentesis removes fluid from the belly (abdomen) to relieve pressure, to test the fluid or both.  * Thoracentesis removes fluid from the sac around the lungs to relieve pressure, to test the fluid or both.  * Aspiration removes fluid from any part of the body, then the fluid is tested for disease or infection.    Who should I call with questions: If you have any questions, please call the Imaging Department where you will have your exam. Directions, parking instructions, and other information are available on our website, [email or web address].     Sep 07, 2021  1:30 PM  (Arrive by 1:15 PM)  US PARACENTESIS with HIUS1, HIXRRN, HIIRRAD  HI ULTRASOUND ([facility] ) [address] [place] [address]  [phone]   How do I prepare for my exam? (Food and drink instructions)  No Food and Drink Restrictions.    How do I prepare for my exam? (Other instructions)  IF YOUR DOCTOR ALSO PRESCRIBED SEDATION DURING THE EXAM (medicine to help you relax): You will receive separate instructions about driving, eating, and additional tests that may be necessary prior to your exam day.    What should I wear: Wear  comfortable clothes.    How long does the exam take: Aspiration (no sedation treatment takes about an hour).  For paracentesis, thoracentesis or sedation plan to spend at least three hours at the hospital.    What should I bring: Bring a list of your medicines, including vitamins, minerals and over-the-counter drugs. It is safest to leave personal items at home. Bring your 's license or photo ID and your insurance card.    Do I need a :  No  is needed.    What do I need to tell my doctor:  Tell your doctor in advance:  * If you are or may be pregnant.  * If you are taking Coumadin (or any other blood thinners) 5 days prior to the exam for any special instructions.  * If you are diabetic to determine if your insulin needs have to be adjusted for the exam.    What should I do after the exam: Take it easy the rest of the day. You can return to normal activities the next day.    What is this test: This test uses a long, thin needle or tube to remove tissue, fluid, or other cells from your body. Pictures from an ultrasound will guide the needle to the right place. (Ultrasound uses sound waves to create pictures of the body on a video screen. You will not feel the sound waves.)    * A biopsy removes tissue or other cells from the body; we send the tissue or cells to a lab for testing.  * Paracentesis removes fluid from the belly (abdomen) to relieve pressure, to test the fluid or both.  * Thoracentesis removes fluid from the sac around the lungs to relieve pressure, to test the fluid or both.  * Aspiration removes fluid from any part of the body, then the fluid is tested for disease or infection.    Who should I call with questions: If you have any questions, please call the Imaging Department where you will have your exam. Directions, parking instructions, and other information are available on our website, [email or web address].     Sep 10, 2021  1:15 PM  (Arrive by 1:00 PM)  SHORT with Arlene  LETTY Becker  [facility] ([facility] ) [address]  [facility] [address]  [phone]      Sep 14, 2021 11:30 AM  US PARACENTESIS with HIUS1, HIXRRN, HIIRRAD  HI ULTRASOUND ([facility] ) [address]  [facility] [address]  [phone]   How do I prepare for my exam? (Food and drink instructions)  No Food and Drink Restrictions.    How do I prepare for my exam? (Other instructions)  IF YOUR DOCTOR ALSO PRESCRIBED SEDATION DURING THE EXAM (medicine to help you relax): You will receive separate instructions about driving, eating, and additional tests that may be necessary prior to your exam day.    What should I wear: Wear comfortable clothes.    How long does the exam take: Aspiration (no sedation treatment takes about an hour).  For paracentesis, thoracentesis or sedation plan to spend at least three hours at the hospital.    What should I bring: Bring a list of your medicines, including vitamins, minerals and over-the-counter drugs. It is safest to leave personal items at home. Bring your 's license or photo ID and your insurance card.    Do I need a :  No  is needed.    What do I need to tell my doctor:  Tell your doctor in advance:  * If you are or may be pregnant.  * If you are taking Coumadin (or any other blood thinners) 5 days prior to the exam for any special instructions.  * If you are diabetic to determine if your insulin needs have to be adjusted for the exam.    What should I do after the exam: Take it easy the rest of the day. You can return to normal activities the next day.    What is this test: This test uses a long, thin needle or tube to remove tissue, fluid, or other cells from your body. Pictures from an ultrasound will guide the needle to the right place. (Ultrasound uses sound waves to create pictures of the body on a video screen. You will not feel the sound waves.)    * A biopsy removes tissue or  other cells from the body; we send the tissue or cells to a lab for testing.  * Paracentesis removes fluid from the belly (abdomen) to relieve pressure, to test the fluid or both.  * Thoracentesis removes fluid from the sac around the lungs to relieve pressure, to test the fluid or both.  * Aspiration removes fluid from any part of the body, then the fluid is tested for disease or infection.    Who should I call with questions: If you have any questions, please call the Imaging Department where you will have your exam. Directions, parking instructions, and other information are available on our website, [email or web address].     Sep 21, 2021 11:30 AM  (Arrive by 11:15 AM)  US PARACENTESIS with HIUS1, HIXRRN, HIIRRAD  HI ULTRASOUND ([facility] ) [address] [place] [address]  [phone]   How do I prepare for my exam? (Food and drink instructions)  No Food and Drink Restrictions.    How do I prepare for my exam? (Other instructions)  IF YOUR DOCTOR ALSO PRESCRIBED SEDATION DURING THE EXAM (medicine to help you relax): You will receive separate instructions about driving, eating, and additional tests that may be necessary prior to your exam day.    What should I wear: Wear comfortable clothes.    How long does the exam take: Aspiration (no sedation treatment takes about an hour).  For paracentesis, thoracentesis or sedation plan to spend at least three hours at the hospital.    What should I bring: Bring a list of your medicines, including vitamins, minerals and over-the-counter drugs. It is safest to leave personal items at home. Bring your 's license or photo ID and your insurance card.    Do I need a :  No  is needed.    What do I need to tell my doctor:  Tell your doctor in advance:  * If you are or may be pregnant.  * If you are taking Coumadin (or any other blood thinners) 5 days prior to the exam for any special instructions.  * If you are diabetic to  determine if your insulin needs have to be adjusted for the exam.    What should I do after the exam: Take it easy the rest of the day. You can return to normal activities the next day.    What is this test: This test uses a long, thin needle or tube to remove tissue, fluid, or other cells from your body. Pictures from an ultrasound will guide the needle to the right place. (Ultrasound uses sound waves to create pictures of the body on a video screen. You will not feel the sound waves.)    * A biopsy removes tissue or other cells from the body; we send the tissue or cells to a lab for testing.  * Paracentesis removes fluid from the belly (abdomen) to relieve pressure, to test the fluid or both.  * Thoracentesis removes fluid from the sac around the lungs to relieve pressure, to test the fluid or both.  * Aspiration removes fluid from any part of the body, then the fluid is tested for disease or infection.    Who should I call with questions: If you have any questions, please call the Imaging Department where you will have your exam. Directions, parking instructions, and other information are available on our website, [email or web address].     Nov 26, 2021 12:30 PM  LAB with  LAB  [facility] ([facility] ) [address]  [facility] [phone]   Please do not eat 10-12 hours before your appointment if you are coming in fasting for labs on lipids, cholesterol, or glucose (sugar). Does not apply to pregnant women. Water, tea and black coffee (with nothing added) is okay. Do not drink other fluids, diet soda or gum. If you have concerns about taking your medications, please send a message by clicking on Secure Messaging, Message Your Care Team.     Nov 26, 2021  2:00 PM  (Arrive by 1:00 PM)  CT CHEST/ABDOMEN/PELVIS W CONTRAST with HICT1  HI CT SCAN ([facility] ) [address]  [place] [id number] [phone]   How do I  prepare for my exam? (Food and drink instructions)  **You will have contrast for this exam.**  No Food and Drink Restrictions    How do I prepare for my exam? (Other instructions)  Please arrive 30 minutes early for your CT.  Once in the department you might be asked to drink water 15-20 minutes prior to your exam.  If indicated you may be asked to drink an oral contrast in advance of your CT.  If this is the case, the imaging team will let you know or be in contact with you prior to your appointment    If you have diabetes:  Continue to take your metformin medication on the day of your exam    What should I wear: Please wear loose clothing, such as a sweat suit or jogging clothes. Avoid snaps, zippers and other metal. We may ask you to undress and put on a hospital gown.    How long does the exam take: Most scans take less than 20 minutes.    What should I bring: Please bring any scans or X-rays taken at other hospitals, if similar tests were done. It is safest to leave personal items at home.    Do I need a : No  is needed.    What do I need to tell my doctor?  Be sure to tell your doctor:  * If you have any allergies.  * If there's any chance you are pregnant.  * If you are breastfeeding.    What should I do after the exam: No restrictions, You may resume normal activities.    What is this test: A CT (computed tomography) scan is a series of pictures that allows us to look inside your body. The scanner creates images of the body in cross sections, much like slices of bread. This helps us see any problems more clearly. You may receive contrast (X-ray dye) before or during your scan. You will be asked to drink the contrast.    Who should I call with questions: If you have any questions, please call the Imaging Department where you will have your exam. Directions, parking instructions, and other information is available on our website, [email or web address].     Nov 30, 2021  3:00 PM  (Arrive by 2:45  "PM)  Return Visit with Maryana Rodrigues NP  [facility] ([facility] ) [address]  [facility] [address]  [phone]         Care Instructions       Further instructions from your care team           DISCHARGE INSTRUCTIONS  Paracentesis      IMPORTANT: As you prepare for discharge, the following information will help you return to your best level of health.       This Information Is About Your Follow Up Care  Call your doctor if you do not get better. Call sooner if you feel worse. You can reach your doctor by calling their clinic phone number.                                    This Information Is About Procedures      PARACENTESIS  This procedure involved the insertion of a needle into the space between your abdominal organs and the membrane that surrounds them (peritoneal space). It was done to get a sample of the fluid or to remove excess fluid.    Call your doctor if you have:    any drainage from the procedure site.    any redness, swelling, pain or pus around the procedure site.    swelling in your scrotum.    any new or severe symptoms.                        This Information Is About Your Illness and Diagnosis    ASCITES    Ascites is extra fluid in your abdomen. The lining inside your abdomen is called the \"peritoneum\". The peritoneum is made up of two thin layers. One layer is attached to the wall of the abdomen. The other layer is wrapped around the abdominal organs.  These layers usually slide right next to each other, without extra fluid. With ascites, fluid builds up in the space between these layers.    Causes of ascites may include:    Cirrhosis of the liver (most common cause)    A blood clot in the vein of the liver    Congestive heart failure    Inflammation of the sac around the heart    Chronic hepatitis    Pancreatitis    Liver cancer    Nephrotic syndrome    Poor nutrition    Cancer in the abdomen    Chronic hemodialysis    Infection in the " abdomen    When you go home, follow these instructions:    Take your medications as scheduled.    Follow a low sodium diet if it is ordered for you.     Try using herbs and other flavorings, like lemon juice, to season your food.    Avoid canned and prepackaged foods.    Do not add salt to any of your food, either while cooking or while eating.    Avoid salty snacks like chips, nuts, and crackers.  Try fresh fruit or vegetables instead.    Follow instructions given to you by your dietician at the hospital.    Sleep with your head elevated (on several pillows or with wood blocks under the head of the bed) if breathing is hard for you when lying flat.    Eat small frequent meals if you get full easily.    Keep follow up appointments with your doctor.     Call your doctor if:    You notice your clothes are getting tighter around the waist.    Your weight is increasing.    You are more short of breath than usual.    You are having more discomfort in your abdomen.    You have trouble following your diet.    You have any other questions or concerns.                                  Additional Information About Your Visit       Care EveryWhere ID    This is your Care EveryWhere ID. This could be used by other organizations to access your [facility] medical records  [id number]        Primary Care Provider Office Phone # Fax #    WILIAM Velasco [phone]      Equal Access to Services    NIKHIL ARAYA : Lena burriso Soannalise, waaxda lubrooklynadaha, qaybta kaalmada adeegyada, ramos tillman. So [facility] [phone].    ATENCIÓN: Si habla español, tiene a diop disposición servicios gratuitos de asistencia lingüística. Llame al [phone].    We comply with applicable federal and state civil rights laws, including the [place] Human Rights Act. We do not discriminate on the basis of race, color, creed, Christianity, national origin, marital status, age, disability, sex, sexual  orientation, or gender identity.    If you would like an itemization of your charges they will now be available in [employer] 30 days after discharge. To access the itemized statements in [employer] go to billing/billing summary. From there select view account. There will be multiple tabs showing an overview of your account, detail, payments, and communications. From the communications tab you can see your monthly statements, your itemized statements, and any billing letters generated for your account. If you do not have a [employer] account and need help getting access please contact [employer] support at [phone].  If you would prefer to have your itemized statements mailed please contact our automated itemized bill request line at [phone] option  2.       Thank you!    Thank you for choosing [facility] for your care. Our goal is always to provide you with excellent care. Hearing back from our patients is one way we can continue to improve our services. Please take a few minutes to complete the written survey that you may receive in the mail after you visit with us. Thank you!         Medication List     Notice    This visit is during an admission. Changes to the med list made in this visit will be reflected in the After Visit Summary of the admission.

## 2021-09-02 NOTE — PROGRESS NOTES
Procedure: US Paracentesis, right    There were  no complications and patient has no symptoms..      Tolerated procedure well.    Patient to US for Paracentesis.  Positioned supine on table.  Draining cloudy yellow fluid.  Fluid will not be sent to lab. 4400 ml total fld drained.   Pt tolerated the procedure well.      Discharge instructions given to pt.    Radiologist:Dr. Quiñonez    Time Out: Prior to the start of the procedure and with procedural staff participation, I verbally confirmed the patient s identity using two indicators, relevant allergies, that the procedure was appropriate and matched the consent or emergent situation, and that the correct equipment/implants were available. Immediately prior to starting the procedure I conducted the Time Out with the procedural staff and re-confirmed the patient s name, procedure, and site/side. (The Joint Commission universal protocol was followed.)  Yes    Position:  supine    Pain:  0    Sedation:None. Local Anesthestic used  No sedation    Estimated Blood Loss: None     Condition: Stable    Comments: See dictated procedure note for full details     KERA NEVES RN

## 2021-09-02 NOTE — IP AVS SNAPSHOT
HI ULTRASOUND  [address] [place] [address]  Phone: [phone]                                    After Visit Summary   9/2/2021    Nidhi Miguel    MRN: [id number]           After Visit Summary Signature Page    I have received my discharge instructions, and my questions have been answered. I have discussed any challenges I see with this plan with the nurse or doctor.    ..........................................................................................................................................  Patient/Patient Representative Signature      ..........................................................................................................................................  Patient Representative Print Name and Relationship to Patient    ..................................................               ................................................  Date                                   Time    ..........................................................................................................................................  Reviewed by Signature/Title    ...................................................              ..............................................  Date                                               Time          22EPIC Rev 08/18

## 2021-09-07 NOTE — DISCHARGE INSTRUCTIONS
Take the steroids and antibiotic as prescribed until finished.    Continue using the nebulizers and inhaler as needed at home.    Follow-up with your doctor if no improvement in symptoms.    Return to emergency department for any concerning symptoms.

## 2021-09-07 NOTE — ED PROVIDER NOTES
History     Chief Complaint   Patient presents with     Cough     past 2 days     HPI  Nidhi Miguel is a 55 year old female who presents amatory to urgent care for evaluation of a nonproductive cough and shortness of breath.  Onset 3 days ago.  No chest pain, fever or chills.  No known recent ill contacts.  Patient has been using nebulizers and inhalers at home.  Current tobacco use.      Allergies:  Allergies   Allergen Reactions     Codeine Hives     Tylenol 3  Tylenol 3  Tylenol 3     Lorazepam Other (See Comments)     Other reaction(s): Confusion     Acetaminophen-Codeine Hives       Problem List:    Patient Active Problem List    Diagnosis Date Noted     Alcohol abuse 04/04/2020     Priority: Medium     Epigastric pain 04/04/2020     Priority: Medium     Dehydration 04/04/2020     Priority: Medium     Hematemesis 04/04/2020     Priority: Medium     Alcoholic hepatitis without ascites 02/09/2020     Priority: Medium     Hypokalemia 02/09/2020     Priority: Medium     Hypomagnesemia 02/09/2020     Priority: Medium     Macrocytic anemia 02/09/2020     Priority: Medium     Thrombocytopenia (H) 02/09/2020     Priority: Medium     Acute colitis 02/07/2020     Priority: Medium     Hematemesis with nausea 02/07/2020     Priority: Medium     Essential hypertension 06/20/2019     Priority: Medium     Major depressive disorder, recurrent, moderate (H) 06/20/2019     Priority: Medium     Asthma, intermittent 02/06/2018     Priority: Medium     Chemical dependency (H) 02/06/2018     Priority: Medium     Overview:   10/21/12, per d/w Dr Bell, pt has abused etoh for years, hx of meth/marij/cocaine abuse, at [facility] fac approx one year       Left middle cerebral artery stroke (H) 07/29/2016     Priority: Medium     History of cerebrovascular accident (CVA) in adulthood 07/29/2016     Priority: Medium     Left CVA       Moderate alcohol dependence in early remission (H) 07/29/2016     Priority: Medium      Alcohol-induced pancreatitis 12/12/2014     Priority: Medium     Chronic neck pain 04/05/2013     Priority: Medium     Status post cervical spinal arthrodesis 03/29/2012     Priority: Medium     Overview:   IMO Update 10/11       Pseudoarthrosis of cervical spine (H) 02/02/2012     Priority: Medium     Alcohol dependence with intoxication (H) 09/13/2011     Priority: Medium     Overview:        Tobacco use disorder 03/12/2010     Priority: Medium     LTBI (latent tuberculosis infection) 03/03/2010     Priority: Medium     Overview:   Positive skin test 2002       Lumbago 11/15/2006     Priority: Medium     Overview:   IMO Update 10/11          Past Medical History:    Past Medical History:   Diagnosis Date     Acquired absence of both cervix and uterus      Acute bronchospasm      Allergic rhinitis      Cervicalgia      COPD (chronic obstructive pulmonary disease) (H)      Hypertension      Low back pain      Mild intermittent asthma, uncomplicated      Other psychoactive substance dependence, uncomplicated (H)      Personal history of other medical treatment (CODE)      Personal history of other medical treatment (CODE)      Pneumonia due to other specified bacteria (H)      Sepsis (H)      Uncomplicated alcohol dependence (H)        Past Surgical History:    Past Surgical History:   Procedure Laterality Date     FUSION CERVICAL ANTERIOR ONE LEVEL      2/2012,C3-7 posterior instrumented fusion iwth lateral mass screws and cancellous bone and progenix     LUMPECTOMY BREAST      1998,Left, benign     OTHER SURGICAL HISTORY      1990s,[id number],COLPOSCOPY,Abnormal paps     OTHER SURGICAL HISTORY      2000,043811,OTHER,Right floating kidney     OTHER SURGICAL HISTORY      3/2010,[id number],CERVICAL DISKECTOMY,C5-6, subsequent osteomyelitis and deformity     OTHER SURGICAL HISTORY      2/2012,[id number],CERVICAL DISKECTOMY,C5-6 redo and C4-7 arthrodesis and graft and anterior placement of plate.     UPPER GI ENDOSCOPY N/A  2020       Family History:    Family History   Problem Relation Age of Onset     Coronary Artery Disease Father      Hypertension Father         Hypertension     Prostate Cancer Father      Substance Abuse Father         Alcohol/Drug,alcoholic but quit     Cancer Maternal Grandmother         Cancer,Pancreatic cancer     Substance Abuse Paternal Grandfather         Alcohol/Drug,emphysema chemically induced..     Hypertension Paternal Grandfather         Hypertension     Hyperlipidemia Mother      Diabetes No family hx of      Colon Cancer No family hx of      Breast Cancer No family hx of      Other Cancer No family hx of      Cerebrovascular Disease No family hx of      Thyroid Disease No family hx of      Genetic Disorder No family hx of      Asthma No family hx of      Anesthesia Reaction No family hx of        Social History:  Marital Status:  Single [1]  Social History     Tobacco Use     Smoking status: Current Every Day Smoker     Packs/day: 0.30     Years: 30.00     Pack years: 9.00     Types: Cigarettes     Start date: 1980     Smokeless tobacco: Never Used     Tobacco comment: denies quit plan 3/10/21   Substance Use Topics     Alcohol use: Yes     Alcohol/week: 14.0 standard drinks     Comment: today, vodka straight     Drug use: No     Types: Other     Comment: Drug use: NoHistory of methamphetamine use        Medications:    doxycycline hyclate (VIBRA-TABS) 100 MG tablet  predniSONE (DELTASONE) 20 MG tablet  albuterol (PROAIR HFA/PROVENTIL HFA/VENTOLIN HFA) 108 (90 Base) MCG/ACT inhaler  albuterol (PROVENTIL) (2.5 MG/3ML) 0.083% neb solution  furosemide (LASIX) 20 MG tablet  ipratropium - albuterol 0.5 mg/2.5 mg/3 mL (DUONEB) 0.5-2.5 (3) MG/3ML neb solution  Prenatal Vit-Fe Fumarate-FA (M- PLUS) 27-1 MG TABS  Prenatal Vit-Fe Fumarate-FA (PRENATAL PLUS) 27-1 MG TABS  spironolactone (ALDACTONE) 50 MG tablet  SYMBICORT 160-4.5 MCG/ACT Inhaler          Review of Systems   Constitutional:  Negative for appetite change, chills and fever.   Respiratory: Positive for cough and shortness of breath.    Cardiovascular: Negative for chest pain.   All other systems reviewed and are negative.      Physical Exam   BP: 131/74  Pulse: 100  Temp: 97.6  F (36.4  C)  Resp: 16  Weight: 52.6 kg (116 lb)  SpO2: 99 %      Physical Exam  Vitals and nursing note reviewed.   Constitutional:       Appearance: Normal appearance. She is not ill-appearing or toxic-appearing.   HENT:      Head: Normocephalic.      Right Ear: Tympanic membrane and ear canal normal.      Left Ear: Tympanic membrane and ear canal normal.      Nose: Nose normal.      Mouth/Throat:      Mouth: Mucous membranes are moist.      Pharynx: Oropharynx is clear.   Eyes:      Extraocular Movements: Extraocular movements intact.      Pupils: Pupils are equal, round, and reactive to light.   Cardiovascular:      Rate and Rhythm: Normal rate and regular rhythm.   Pulmonary:      Effort: Pulmonary effort is normal. No respiratory distress.      Breath sounds: No stridor. Rhonchi (mild) present. No wheezing.   Musculoskeletal:         General: Normal range of motion.      Cervical back: Neck supple.   Skin:     General: Skin is warm and dry.      Capillary Refill: Capillary refill takes less than 2 seconds.   Neurological:      Mental Status: She is alert and oriented to person, place, and time.         ED Course        Procedures            No results found for this or any previous visit (from the past 24 hour(s)).    Medications - No data to display    Assessments & Plan (with Medical Decision Making)     I have reviewed the nursing notes.    55-year-old female that presented for evaluation of cough and shortness of breath x3 days.  Mild rhonchi auscultated to bilateral lung fields.  Respirations nonlabored.  Oxygen saturation 99% on room air.  Rest of vital signs are within normal limits.  Current tobacco use.  History of COPD.    Patient will be treated for  COPD exacerbation with doxycycline and prednisone.  Recommended continued use of nebulizers and albuterol inhaler at home.  Follow-up with PCP if no improvement in symptoms.  Return to ED/UC for any concerning symptoms.    I have reviewed the findings, diagnosis, plan and need for follow up with the patient.  This document was prepared using a combination of typing and voice generated software.  While every attempt was made for accuracy, spelling and grammatical errors may exist.    New Prescriptions    DOXYCYCLINE HYCLATE (VIBRA-TABS) 100 MG TABLET    Take 1 tablet (100 mg) by mouth 2 times daily for 7 days    PREDNISONE (DELTASONE) 20 MG TABLET    Take two tablets (= 40mg) each day for 5 (five) days       Final diagnoses:   COPD exacerbation (H)       9/7/2021   [facility]     Mpofu, Prudence, CNP  09/09/21 5421

## 2021-09-07 NOTE — ED TRIAGE NOTES
Pt presents with c/o a cough and sob. Sx started 2 days ago. Pt is not covid vaccinated. Pt has a hx of copd. Pt has been using inhales.

## 2021-09-10 NOTE — DISCHARGE INSTRUCTIONS
You were seen today in the [facility] emergency department for shortness of breath.  We drained a large amount of fluid from your right lung.  This fluid is likely due to your liver failure, it does not look to be consistent with infection or inflammation.  We do expect that the fluid will slowly reaccumulate and you will need to continue monitoring things with your primary doctor and discuss options for when the fluid does start to cause problems again.  Otherwise we think it is reasonable for you to discharge home as you are requesting at this point.  If anything worsens with your breathing or you have any other immediate concerns we should reevaluate you right away in the ER.

## 2021-09-10 NOTE — NURSING NOTE
"Chief Complaint   Patient presents with     Depression       Initial /52   Pulse (!) 130   Temp 98.6  F (37  C) (Tympanic)   Resp 16   Ht 1.626 m (5' 4\")   Wt 49.9 kg (110 lb)   SpO2 90%   BMI 18.88 kg/m   Estimated body mass index is 18.88 kg/m  as calculated from the following:    Height as of this encounter: 1.626 m (5' 4\").    Weight as of this encounter: 49.9 kg (110 lb).  Medication Reconciliation: complete  Jayshree Herrera LPN    "

## 2021-09-10 NOTE — ED TRIAGE NOTES
Presents from [facility] with a pleural effusion and SOB. Pt drove self to ED. Provider at [facility] aware.

## 2021-09-10 NOTE — LETTER
My COPD Action Plan     Name: Nidhi Miguel    YOB: 1965   Date: 9/10/2021    My doctor: Arlene Becker NP   My clinic: [address] [place] [facility] [address]  [phone]  My Controller Medicine: Albuterol (Proair/Ventolin/Proventolin)  Ipratropium (Atrovent) symbicort   Dose:      My Rescue Medicine: Albuterol (Proair/Ventolin/Proventil) inhaler   Dose:     My Flare Up Medicine: Prednisone   Dose: 20 mg     My COPD Severity: Mild = FeV1 > 80%      Use of Oxygen: Oxygen Not Prescribed      Make sure you've had your pneumonia   vaccines.          GREEN ZONE       Doing well today      Usual level of activity and exercise    Usual amount of cough and mucus    No shortness of breath    Usual level of health (thinking clearly, sleeping well, feel like eating) Actions:      Take daily medicines    Use oxygen as prescribed    Follow regular exercise and diet plan    Avoid cigarette smoke and other irritants that harm the lungs           YELLOW ZONE          Having a bad day or flare up      Short of breath more than usual    A lot more sputum (mucus) than usual    Sputum looks yellow, green, tan, brown or bloody    More coughing or wheezing    Fever or chills    Less energy; trouble completing activities    Trouble thinking or focusing    Using quick relief inhaler or nebulizer more often    Poor sleep; symptoms wake me up    Do not feel like eating Actions:      Get plenty of rest    Take daily medicines    Use quick relief inhaler every 4 hours    If you use oxygen, call you doctor to see if you should adjust your oxygen    Do breathing exercises or other things to help you relax    Let a loved one, friend or neighbor know you are feeling worse    Call your care team if you have 2 or more symptoms.  Start taking steroids or antibiotics if directed by your care team           RED ZONE       Need medical care now      Severe shortness of breath (feel you can't  breathe)    Fever, chills    Not enough breath to do any activity    Trouble coughing up mucus, walking or talking    Blood in mucus    Frequent coughing   Rescue medicines are not working    Not able to sleep because of breathing    Feel confused or drowsy    Chest pain    Actions:      Call your health care team.  If you cannot reach your care team, call 911 or go to the emergency room.        Annual Reminders:  Meet with Care Team, Flu Shot every Fall  Pharmacy: [facility] [address]

## 2021-09-10 NOTE — ED NOTES
Discharge instructions reviewed and patient verbalizes understanding. Patient SOB resolved after thoracentesis. Steady gait. Instructed to return to ED with worsening symptoms or follow up with PCP.

## 2021-09-10 NOTE — ED NOTES
1544 time out done for right thoracentesis. Procedure explained and consent signed by patient. Tolerated well. Placed on 2LPM supplementary Drained approx 2500ml yellow clear fluid.

## 2021-09-10 NOTE — PROGRESS NOTES
Assessment & Plan     Fiona presents to the clinic today for treatment of insomnia. She reports that she  was in UC 3 days ago and was treated for a COPD flare with prednisone and doxycyline. She is not feeling any better. When she was in UC her 02 sat was 99% and heart rate 100. Today her 02 sat is 90% and heart rate in the 130's. She reports she did a neb treatment prior to coming to the clinic which wasn't helpful. She appears SOB with an increased in work of breathing. She would like her breathing addressed today as well.     Chest XRAY shows a large right sided pleural effusion. WBC normal with out shift. She will go to the ER for further evaluation and she wishes to drive herself there. I called Dr. Marino Starks who is working in the ER at this facility today for a provider to provider report.     (R05) Cough  (primary encounter diagnosis)  Comment: not improving. Chest XRAY and CBC   Plan: CBC with platelets and differential, XR CHEST 2        VW (Clinic Performed)            (R00.0) Tachycardia  Comment: heart rate 130's. No chest pain. EKG ordered in clinic, but ended up sending to ER for further evaluation after chest XRAY reviewed   Plan: CANCELED: EKG 12-lead complete w/read - (Clinic        Performed)            (G47.00) Persistent insomnia  Comment: trial trazodone   Plan: traZODone (DESYREL) 50 MG tablet            (J90) Pleural effusion  Comment: Large right side pleural effusion   Plan: To ER for further evaluation and probable thoracentesis       (F34.1) Dysthymic disorder  Comment: trial trazodone   Plan: traZODone (DESYREL) 50 MG tablet               Tobacco Cessation:   reports that she has been smoking cigarettes. She started smoking about 41 years ago. She has a 9.00 pack-year smoking history. She has never used smokeless tobacco.  Tobacco Cessation Action Plan: Information offered: Patient not interested at this time    See Patient Instructions    Return for To ER for further  "evaluation today and 6 week follow up for med check .    Arlene Becker NP  [facility]    Macy Jaimes is a 55 year old who presents for the following health issues   HPI     Abnormal Mood Symptoms  Onset/Duration: many years  Description: problems sleeping  Depression (if yes, do PHQ-9): YES  Anxiety (if yes, do UCHE-7): no  Accompanying Signs & Symptoms:  Still participating in activities that you used to enjoy: YES  Fatigue: YES  Irritability: YES  Difficulty concentrating: YES  Changes in appetite: no  Problems with sleep: YES  Heart racing/beating fast: YES  Abnormally elevated, expansive, or irritable mood: YES  Persistently increased activity or energy: no  Thoughts of hurting yourself or others: no  History:  Recent stress or major life event: YES- has liver disease  Prior depression or anxiety: depression   Family history of depression or anxiety: no  Alcohol/drug use: no  Difficulty sleeping: YES  Precipitating or alleviating factors: None  Therapies tried and outcome: tried melatonin and benadryl     Cough for the past week that is worsening. She was seen in  on 9-7-21. She was treated for a COPD flare with prednisone and doxycyline. Her 02 sat in  was 99% and today it is 90% with noticeable SOB. She continues to smoke and smokes 1/3 PPD of cigarettes.     PHQ 2/22/2021 3/22/2021 9/10/2021   PHQ-9 Total Score 12 2 6   Q9: Thoughts of better off dead/self-harm past 2 weeks Not at all Not at all Not at all     UCHE-7 SCORE 1/9/2020 2/22/2021 9/10/2021   Total Score 0 1 1       Review of Systems   Constitutional, HEENT, cardiovascular, pulmonary, GI, , musculoskeletal, neuro, skin, endocrine and psych systems are negative, except as otherwise noted.      Objective    /52   Pulse (!) 130   Temp 98.6  F (37  C) (Tympanic)   Resp 16   Ht 1.626 m (5' 4\")   Wt 49.9 kg (110 lb)   SpO2 90%   BMI 18.88 kg/m    Body mass index is 18.88 kg/m .  Physical Exam "   GENERAL: healthy, alert and no distress  NECK: no adenopathy, no asymmetry, masses, or scars and thyroid normal to palpation  RESP:  no rales, rhonchi or wheezes. +decreased breathe sounds to bilateral bases and right mid lobe. Trachea midline  CV: regular rate and rhythm, normal S1 S2, no S3 or S4, no murmur, click or rub, no peripheral edema and peripheral pulses strong  MS: no gross musculoskeletal defects noted, no edema  SKIN: no suspicious lesions or rashes  NEURO: Normal strength and tone, mentation intact and speech normal  PSYCH: mentation appears normal, affect normal/bright

## 2021-09-10 NOTE — PATIENT INSTRUCTIONS
Patient Education     Treating Insomnia     Learning to relax before bedtime can improve your sleep.   Good sleeping habits are a key part of treatment. If needed, some medicines may help you sleep better at first. Making healthy lifestyle changes and learning to relax can improve your sleep. Treating insomnia takes commitment. But trust that your efforts will pay off. Be sure to talk with your healthcare provider before taking any medicine.  Healthy lifestyle  Your lifestyle affects your health and your sleep. Here are some healthy habits:    Keep a regular sleep schedule. Go to bed and get up at the same time each day.    Exercise regularly. It may help you reduce stress. Avoid strenuous exercise for 2 to 4 hours before bedtime.    Avoid or limit naps, especially in the late afternoon.    Use your bed only for sleep and sex.    Don t spend too much time in bed trying to fall asleep. If you can t fall asleep, get up and do something (no electronics) until you become tired and drowsy.    Avoid or limit caffeine and nicotine for up to 6 hours before bedtime. They can keep you awake at night.     Also avoid alcohol for at least 4 to 6 hours before bedtime. It may help you fall asleep at first. But you will have more awakenings during the night. And your sleep will not be restful.  Before bedtime  To sleep better every night, try these tips:    Have a bedtime routine to let your body and mind know when it s time to sleep.    Think of going to bed as relaxing and enjoyable. Sleep will come sooner.    If your worries don t let you sleep, write them down in a diary. Then close it, and go to bed.    Make sure the room is not too hot or too cold. If it s not dark enough, an eye mask can help. If it s noisy, try using earplugs.    Don't eat a large meal just before bedtime.    Remove noises, bright lights, TVs, cell phones, and computers from your sleeping environment.    Use a comfortable mattress and pillow.  Learn to  relax  Stress, anxiety, and body tension may keep you awake at night. To unwind before bedtime, try a warm bath, meditation, or yoga. Also try the following:    Deep breathing. Sit or lie back in a chair. Take a slow, deep breath. Hold it for 5 counts. Then breathe out slowly through your mouth. Keep doing this until you feel relaxed.    Progressive muscle relaxation. Tense and then relax the muscles in your body as you breathe deeply. Start with your feet and work up your body to your neck and face.  Cognitive Behavioral Therapy (CBT)  CBT is the most effective treatment for long-term insomnia. It tries to address the underlying causes of your sleep problems, including your habits and how you think about sleep.  Individual Therapy  Trever Gr PsyD, LALO  Insomnia   [facility]: [phone]    [facility]: [phone]  [facility]  Visit www.[place].org or call [phone] to find a clinic close to you.  Suggested resources  The resources below may help you relax. This list is for information only. [facility] is not responsible for the quality of services or the actions of any person or organization. There may be a fee to use some of these resources.  Insomnia treatment books  Allyson Mind by Roxy Timmons and Linnette Triplett (2013)  Overcoming Insomnia by Wally Hoffman and Roxy Timomns (2008)  Quiet Your Mind and Get to Sleep: Solutions to Insomnia for Those with Depression, Anxiety or Chronic Pain by Roxy Timmons and Linnette Triplett (2009)  No More Sleepless Nights by Smooth Mcadams and Sabine Alatorre (1996)  Say Allyson to Insomnia by Iker Austin (2009)  The Insomnia Workbook by Arlene Velazquez and Jose Cool (2009)  The Insomnia Answer by Humble Ceja and Eric Soni (2006)  Stress management and relaxation books  The Relaxation and Stress Reduction Workbook by Susannah Esposito,  Radha Evangelista and Karlo Baca (2008)  Stress Management Workbook: Techniques and Self-Assessment Procedures by Nevaeh Gonsalves and Joni Gleason (1997)  A Mindfulness-Based Stress Reduction Workbook by Dominik Marino and Esther Kelley (2010)  The Complete Stress Management Workbook by Harinder Loya, Cruz Case and Anish Jerome (1996)  Online programs  www.SHUTi.me (pronounced shut eye). There is a fee for this program.   www.sleepIO.com (pronounced sleep ee oh). There is a fee for this program.  Other online resources  Deep breathing and progressive muscle relaxation (PMR):    http://www.Ob Hospitalist [facility]  Meditation:    www.Enclara HealthranBeijing JoySee Technology    www.BuildingLayerguidedClever SensemeditationClever Sensesite.com  Guided imagery:    http://www.Ob Hospitalist Group    http://[email or web address].[facility]  (click on  Resource Library,  then choose  Meditation, Relaxation, Guided Imagery )  Apps for your mobile device:    Autogenic Training Progressive Muscle Relaxation by Nonlinear Dynamics Indra    Calm: Meditation and Sleep Stories by Calm.com, Inc.    Trema Group Timer - Meditation Josselin by [employer].  This is not a prescription and these resources are optional. You must pay for any costs when using these resources. Please ask your insurance carrier if you can be reimbursed for these resources. If so, you are responsible for sending the needed details to your insurance carrier. These resources may also be tax deductible as medical expenses. Check with your .  Date Last Reviewed: 5/18/2018  Clinically reviewed by Trever Gr PsyD, LALO, [facility], Director of the Insomnia and Behavioral Sleep Health Program, [facility].  For informational purposes only. Not to replace the advice of your health care provider.  Copyright   2018 [facility]. All rights reserved.

## 2021-09-10 NOTE — ED PROVIDER NOTES
EMERGENCY DEPARTMENT ENCOUNTER      NAME: Nidhi Miguel  AGE: 55 year old female  YOB: 1965  MRN: [id number]  EVALUATION DATE & TIME: 9/10/2021  2:59 PM    PCP: Pam Acuña    ED PROVIDER: Saroj Starks M.D.      Chief Complaint   Patient presents with     Shortness of Breath     Pleural Effusion, presents from [facility]         FINAL IMPRESSION:  1. Pleural effusion    2. Shortness of breath          ED COURSE & MEDICAL DECISION MAKIN year old female presents to the Emergency Department for evaluation of shortness of breath and right-sided pleural effusion.  Patient is tachycardic and borderline hypoxemic when she arrives to the emergency department.  I reviewed her x-ray from clinic which does show a sizable right-sided pleural effusion.  This seems the most likely cause of her dyspnea as she is currently being treated for COPD with doxycycline and prednisone already and is using nebs at home.  Discussed the findings with the patient.  She was consented for thoracentesis which was successful in removing 2.5 L of clear shamar fluid from the right pleural space.  She tolerated this well.  Post procedure x-ray shows resolved effusion.  There is a little bit of a right hilar mass at this point but recent CT was negative in this area so I am very doubtful that this is a malignant effusion.  She has no infectious symptoms, white blood cell count is normal in clinic and she is afebrile.  Labs were sent and the fluid is transudativef.  A culture is still pending but I do not get any sense that this is an infectious fluid collection.  Seems most likely related to transudative effusion from her liver failure.  Other basic lab evaluations here show chronic hypoalbuminemia, coagulopathy of liver disease, and mild anemia. I had initially recommended the patient for admission given her tachycardia and hypoxia however on recheck after some fluids, DuoNeb, and after her procedure, her vital  signs were normalizing, she was stable in the upper 90s on room air, and her tachycardia was substantially improved.  Patient is strongly motivated to discharge home to care for her  and I do not think this is unreasonable based on the results of her work-up here in her current condition.  She does understand that if anything worsens she is to return right away to the ER so we can reevaluate her.  Otherwise she should plan for close follow-up with primary care.  I did explain that the pleural effusion is likely to reaccumulate and will need to discuss long-term management strategies.  Patient left the emergency department in stable condition.        MEDICATIONS GIVEN IN THE EMERGENCY:  Medications   0.9% sodium chloride BOLUS (1,000 mLs Intravenous New Bag 9/10/21 1613)   ipratropium - albuterol 0.5 mg/2.5 mg/3 mL (DUONEB) neb solution 3 mL (3 mLs Nebulization Given 9/10/21 1622)       NEW PRESCRIPTIONS STARTED AT TODAY'S ER VISIT  New Prescriptions    No medications on file          =================================================================    HPI    Patient information was obtained from: Patient      Nidhi Miguel is a 55 year old female with a pertinent history of COPD, liver failure with ascites who presents to this ED today for evaluation of shortness of breath.  She has been dealing with shortness of breath since early this week.  She had a visit to urgent care where she was started on doxycycline and prednisone for a COPD exacerbation.  She went to clinic today continuing to have dyspnea.  She was found to be hypoxic to the mid 80s and has a large pleural effusion on the right.  She is not having any current chest pain.  She denies any new cough or fever.  She denies any new leg swelling or pain.  She notes no nausea or vomiting.      REVIEW OF SYSTEMS   All systems reviewed and negative except as noted in HPI.    PAST MEDICAL HISTORY:  Past Medical History:   Diagnosis Date     Acquired  absence of both cervix and uterus     No Comments Provided     Acute bronchospasm     possible asthma     Allergic rhinitis     2011     Cervicalgia     10/27/2011     COPD (chronic obstructive pulmonary disease) (H)      Hypertension      Low back pain     Chronic low back pain (see narcotic contract).     Mild intermittent asthma, uncomplicated     No Comments Provided     Other psychoactive substance dependence, uncomplicated (H)     No Comments Provided     Personal history of other medical treatment (CODE)      1, para 1     Personal history of other medical treatment (CODE)     L4-5 with degenerative changes     Pneumonia due to other specified bacteria (H)     2012,Respiratory failure, vent support     Sepsis (H)     2012     Uncomplicated alcohol dependence (H)     2011       PAST SURGICAL HISTORY:  Past Surgical History:   Procedure Laterality Date     FUSION CERVICAL ANTERIOR ONE LEVEL      2012,C3-7 posterior instrumented fusion iwth lateral mass screws and cancellous bone and progenix     LUMPECTOMY BREAST      1998,Left, benign     OTHER SURGICAL HISTORY      ,[id number],COLPOSCOPY,Abnormal paps     OTHER SURGICAL HISTORY      ,[id number],OTHER,Right floating kidney     OTHER SURGICAL HISTORY      3/2010,[id number],CERVICAL DISKECTOMY,C5-6, subsequent osteomyelitis and deformity     OTHER SURGICAL HISTORY      2012,[id number],CERVICAL DISKECTOMY,C5-6 redo and C4-7 arthrodesis and graft and anterior placement of plate.     UPPER GI ENDOSCOPY N/A 2020           CURRENT MEDICATIONS:    No current facility-administered medications for this encounter.     Current Outpatient Medications   Medication     albuterol (PROAIR HFA/PROVENTIL HFA/VENTOLIN HFA) 108 (90 Base) MCG/ACT inhaler     albuterol (PROVENTIL) (2.5 MG/3ML) 0.083% neb solution     doxycycline hyclate (VIBRA-TABS) 100 MG tablet     furosemide (LASIX) 20 MG tablet     ipratropium - albuterol 0.5 mg/2.5 mg/3 mL (DUONEB)  0.5-2.5 (3) MG/3ML neb solution     omeprazole (PRILOSEC) 20 MG DR capsule     polyethylene glycol-electrolytes (NULYTELY) 420 g solution     predniSONE (DELTASONE) 20 MG tablet     Prenatal Vit-Fe Fumarate-FA (M-CARLOS ALBERTO PLUS) 27-1 MG TABS     Prenatal Vit-Fe Fumarate-FA (PRENATAL PLUS) 27-1 MG TABS     spironolactone (ALDACTONE) 50 MG tablet     SYMBICORT 160-4.5 MCG/ACT Inhaler     traZODone (DESYREL) 50 MG tablet         ALLERGIES:  Allergies   Allergen Reactions     Codeine Hives     Tylenol 3  Tylenol 3  Tylenol 3     Lorazepam Other (See Comments)     Other reaction(s): Confusion     Acetaminophen-Codeine Hives       FAMILY HISTORY:  Family History   Problem Relation Age of Onset     Coronary Artery Disease Father      Hypertension Father         Hypertension     Prostate Cancer Father      Substance Abuse Father         Alcohol/Drug,alcoholic but quit     Cancer Maternal Grandmother         Cancer,Pancreatic cancer     Substance Abuse Paternal Grandfather         Alcohol/Drug,emphysema chemically induced..     Hypertension Paternal Grandfather         Hypertension     Hyperlipidemia Mother      Diabetes No family hx of      Colon Cancer No family hx of      Breast Cancer No family hx of      Other Cancer No family hx of      Cerebrovascular Disease No family hx of      Thyroid Disease No family hx of      Genetic Disorder No family hx of      Asthma No family hx of      Anesthesia Reaction No family hx of        SOCIAL HISTORY:   Social History     Socioeconomic History     Marital status: Single     Spouse name: Not on file     Number of children: Not on file     Years of education: Not on file     Highest education level: Not on file   Occupational History     Not on file   Tobacco Use     Smoking status: Current Every Day Smoker     Packs/day: 0.30     Years: 30.00     Pack years: 9.00     Types: Cigarettes     Start date: 1980     Smokeless tobacco: Never Used     Tobacco comment: denies quit plan  "3/10/21   Substance and Sexual Activity     Alcohol use: Yes     Alcohol/week: 14.0 standard drinks     Comment: today, vodka straight     Drug use: No     Types: Other     Comment: Drug use: NoHistory of methamphetamine use     Sexual activity: Yes     Partners: Male   Other Topics Concern     Parent/sibling w/ CABG, MI or angioplasty before 65F 55M? Not Asked   Social History Narrative    ** Merged History Encounter **         Mariano-12/2003, back in her custody after rehab in 2012. Now in foster care summer 2013 with her relapse and continues 2015. He goes to [employer].  .    Alcohol and meth use fall 2011, then to [facility] for rehab and now at [facility], [place] after       relapse spring 2013.  Was in [place] 10/2013 for 6 months and work release in  [place] for 7 months.   Back in [facility], [place] 1/ 2015.   Only child, no siblings.     Social Determinants of Health     Financial Resource Strain:      Difficulty of Paying Living Expenses:    Food Insecurity:      Worried About Running Out of Food in the Last Year:      Ran Out of Food in the Last Year:    Transportation Needs:      Lack of Transportation (Medical):      Lack of Transportation (Non-Medical):    Physical Activity:      Days of Exercise per Week:      Minutes of Exercise per Session:    Stress:      Feeling of Stress :    Social Connections:      Frequency of Communication with Friends and Family:      Frequency of Social Gatherings with Friends and Family:      Attends [employer] Services:      Active Member of Clubs or Organizations:      Attends Club or Organization Meetings:      Marital Status:    Intimate Partner Violence:      Fear of Current or Ex-Partner:      Emotionally Abused:      Physically Abused:      Sexually Abused:        VITALS:  /72   Pulse 105   Temp 98  F (36.7  C) (Tympanic)   Resp 24   Ht 1.626 m (5' 4\")   Wt 49.9 kg (110 lb)   SpO2 97%   BMI 18.88 kg/m      PHYSICAL EXAM    Constitutional: Chronically " ill-appearing middle-aged female patient, laying in bed, no distress  HENT: Normocephalic, Atraumatic. Neck Supple.  Eyes: EOMI, Conjunctiva normal.  Respiratory: Breathing is unlabored on room air, lung sounds are diminished on the right.  Cardiovascular: Normal heart rate, Regular rhythm. No peripheral edema.  Abdomen: Soft, nontender.  Musculoskeletal: Good range of motion in all major joints. No major deformities noted.  Integument: Warm, Dry.  Neurologic: Alert & awake, Normal motor function, Normal sensory function, No focal deficits noted.   Psychiatric: Cooperative. Affect appropriate.     LAB:  All pertinent labs reviewed and interpreted.  Labs Ordered and Resulted from Time of ED Arrival Up to the Time of Departure from the ED   BASIC METABOLIC PANEL - Abnormal; Notable for the following components:       Result Value    Glucose 129 (*)     All other components within normal limits   INR - Abnormal; Notable for the following components:    INR 1.41 (*)     All other components within normal limits   LACTATE DEHYDROGENASE - Abnormal; Notable for the following components:    Lactate Dehydrogenase 340 (*)     All other components within normal limits   HEPATIC FUNCTION PANEL - Abnormal; Notable for the following components:    Bilirubin Total 1.4 (*)     Bilirubin Direct 0.6 (*)     Albumin 2.6 (*)     AST 88 (*)     All other components within normal limits   CELL COUNT BODY FLUID - Abnormal; Notable for the following components:    Color Yellow (*)     All other components within normal limits    Narrative:     No reference ranges have been established.  This result  should be interpreted in the context of the patient's clinical condition and   compared to simultaneous measurement in the patient's blood.         NT PROBNP INPATIENT - Normal   TROPONIN I - Normal   COVID-19 VIRUS (CORONAVIRUS) BY PCR - Normal    Narrative:     Testing was performed using the Xpert Xpress SARS-CoV-2 Assay on the   [employer]  Gene-Xpert Instrument Systems. Additional information about   this Emergency Use Authorization (EUA) assay can be found via the Lab   Guide. This test should be ordered for the detection of SARS-CoV-2 in   individuals who meet SARS-CoV-2 clinical and/or epidemiological   criteria. Test performance is unknown in asymptomatic patients. This   test is for in vitro diagnostic use under the FDA EUA for   laboratories certified under CLIA to perform high complexity testing.   This test has not been FDA cleared or approved. A negative result   does not rule out the presence of PCR inhibitors in the specimen or   target RNA in concentration below the limit of detection for the   assay. The possibility of a false negative should be considered if   the patient's recent exposure or clinical presentation suggests   COVID-19. This test was validated by [facility]. This laboratory is certified under the Clinical Laboratory Improve  ment Amendments (CLIA) as qualified to perform high complexity testing.   EXTRA BLUE TOP TUBE   EXTRA RED TOP TUBE   EXTRA GREEN TOP (LITHIUM HEPARIN) TUBE   EXTRA PURPLE TOP TUBE   PROTEIN FLUID    Narrative:     No reference ranges have been established.  This result should be interpreted in the context of the patient's clinical condition and compared to simultaneous measurement in the patient's blood.   LACTATE DEHYDROGENASE FLUID    Narrative:     No reference ranges have been established.  This result should be interpreted in the context of the patient's clinical condition and compared to simultaneous measurement in the patient's blood.   DIFERENTIAL BODY FLUID    Narrative:     No reference ranges have been established.  This result   should be interpreted in the context of the patient's clinical condition and   compared to simultaneous measurement in the patient's blood.   PERIPHERAL IV CATHETER   AEROBIC BACTERIAL CULTURE ROUTINE   EXTRA TUBE    Narrative:      The following orders were created for panel order [place] Draw.  Procedure                               Abnormality         Status                     ---------                               -----------         ------                     Extra Blue Top Tube[[id number]]                              Final result               Extra Red Top Tube[[id number]]                               Final result               Extra Green Top (Lithium...[[id number]]                      Final result               Extra Purple Top Tube[[id number]]                            Final result                 Please view results for these tests on the individual orders.   CELL COUNT WITH DIFFERENTIAL FLUID    Narrative:     The following orders were created for panel order Cell count with differential fluid.  Procedure                               Abnormality         Status                     ---------                               -----------         ------                     Cell Count Body Fluid[[id number]]        Abnormal            Final result               Differential Body Fluid[[id number]]                          Final result                 Please view results for these tests on the individual orders.       RADIOLOGY:  Reviewed all pertinent imaging. Please see official radiology report.  Chest XR,  PA & LAT   Final Result   IMPRESSION: Near complete interval resolution of right pleural   effusion after thoracentesis. No evidence of pneumothorax.      JIGAR CARDENAS MD            SYSTEM ID:  [id number]          [facility]    Thoracentesis    Date/Time: 9/10/2021 5:38 PM  Performed by: Saroj Starks MD  Authorized by: Saroj Starks MD     ANESTHESIA (see MAR for exact dosages):     Anesthesia method:  Local infiltration    Local anesthetic:  Lidocaine 1% w/o epi    PROCEDURE DETAILS      Patient position:  Sitting    Location:  R posterior    Intercostal space:  8th    Puncture method:  Over-the-needle  catheter    Ultrasound guidance: yes      Indwelling catheter placed: no      Needle gauge:  16    Catheter size:  8 Fr    Number of attempts:  1    Drainage characteristics:  Clear    POST PROCEDURE DETAILS     Chest x-ray performed: yes      Chest x-ray findings:  Pleural effusion improved    Patient tolerance of procedure:  Patient tolerated the procedure well with no immediate complications    PROCEDURE   Patient Tolerance:  Patient tolerated the procedure well with no immediate complications              Saroj Starks M.D.  Emergency Medicine  [facility] DEPART[facility]  [address] [place] [id number] [phone]  Dept: [phone]       Saroj Starks MD  09/10/21 5692

## 2021-09-12 NOTE — CONSULTS
"Addendum:   --CUS confirms presence of extensive soft plaque, nearly occluding L. ICA  --300mg plavix load  --ASAP transfer to [facility]a for carotid revascularization  --q2 hour neurochecks on arrival  --consult NSGY for urgent CEA vs JUDE    Balaji Webster MD, MS  Vascular Neurology    To page me or covering stroke neurology team member, click here: AMCOM   Choose \"On Call\" tab at top, then search dropdown box for \"Neurology Adult\", select location, press Enter, then look for stroke/neuro ICU/telestroke.              [facility]    Stroke Telephone Note    I was called by Saroj Starks on 09/12/21 regarding patient Nidhi Miguel. The patient is a 55 year old female with PMHx of prior left MCA stroke, Etoh abuse with liver failure and ascites s/p thoracentesis 2 days prior.  She returns today with right sided weakness and mild aphasia that began at noon.    Stroke Code Data (for stroke code without tele)  Stroke code activated 09/12/21   1604   Stroke provider first response  09/12/21   1605            Last known normal 09/12/21   1200        Time of discovery   (or onset of symptoms) 09/12/21   1200   Head CT read by Stroke Neuro Dr/Provider 09/12/21   1820   Was stroke code   de-escalated? Yes 09/12/21 1856          Imaging Findings   Head CT unrevealing  CTA Head/Neck: near occlusive left ICA luminal hypodense feeling defect: artifact vs. Thrombus/plaque    Thrombolytic Treatment   Not given due to thoracentesis 9/10 and unclear or unfavorable risk-benefit profile for extended window thrombolysis beyond the conventional 4.5 hour time window.    Endovascular Treatment  Not initiated due to absence of proximal vessel occlusion    Impression  1. Acute ischemic stroke secondary to large artery athero    Recommendations   1. Aspirin 325mg daily  2. Stat Carotid Doppler Ultrasound to determine triage: Local stroke work-up versus transfer for revascularization Neuro ICU monitoring, consideration of " "heparin gtt    My recommendations are based on the information provided over the phone by Nidhi Miguel's in-person providers. They are not intended to replace the clinical judgment of her in-person providers. I was not requested to personally see or examine the patient at this time.    I personally spent a total of 45 minutes on September 12, 2021 consulting with her  medical providers and coordinating care.  This includes personally reviewing the patient's medical record including diagnostic testing, neuroimaging, and laboratory studies.       Balaji Webster MD, MS  Vascular Neurology  To page me or covering stroke neurology team member, click here: AMCOM   Choose \"On Call\" tab at top, then search dropdown box for \"Neurology Adult\", select location, press Enter, then look for stroke/neuro ICU/telestroke.           "

## 2021-09-12 NOTE — ED TRIAGE NOTES
Presents to ED with complaints of Right sided weakness and left sided HA. Some slurred speech and confusion as well. EMS concerned for possible CVA. BEFAST positive. RN and MD at bedside on arrival to ED. Onset of sx when the [employer] game started today at noon.

## 2021-09-12 NOTE — ED PROVIDER NOTES
EMERGENCY DEPARTMENT ENCOUNTER      NAME: Nidhi Miguel  AGE: 55 year old female  YOB: 1965  MRN: [id number]  EVALUATION DATE & TIME: 2021  5:58 PM    PCP: Pam Acuña    ED PROVIDER: Saroj Starks M.D.      Chief Complaint   Patient presents with     One-sided Weakness     Right     Headache     left sided       FINAL IMPRESSION:  1. Acute CVA (cerebrovascular accident) (H)    2. Hypokalemia          ED COURSE & MEDICAL DECISION MAKIN year old female presents to the Emergency Department for evaluation of right-sided weakness.  History of previous left MCA stroke, alcoholism with liver failure and ascites, thoracentesis in this emergency department 2 days ago.  She presents with right-sided weakness starting about 6 hours prior to arrival in the emergency department.  She is outside the window for thrombolytic therapy and I also feel this would likely be contraindicated by the procedure performed 2 days ago.  Nevertheless she would potentially be in the window for a catheter directed intervention if appropriate so a tier 2 stroke code was activated.  Patient was expedited to CT.  I spoke with Dr. Webster of the neurology service on call.  She was expedited for head CT which revealed no intracranial hemorrhage.  There was a question of fairly advanced stenosis of her left carotid versus artifact from dental implants.  Stroke neurology is recommending a carotid ultrasound to further delineate this and decide on if she would need to be admitted  at a center capable of carotid endarterectomy.    Other labs notable for's baseline anemia, normal white blood cell count, mildly elevated lactic acid I think probably more related to underlying liver dysfunction.  She was given a 500 cc fluid bolus.  Potassium low at 2.9, replaced.    Patient was given full dose aspirin for now.  Signed out to Dr. Marie to follow-up on results of ultrasound and decide on appropriate  disposition.      National [employer] Stroke Scale  Exam Interval: Baseline   Score    Level of consciousness: (0)   Alert, keenly responsive    LOC questions: (0)   Answers both questions correctly    LOC commands: (0)   Performs both tasks correctly    Best gaze: (0)   Normal    Visual: (0)   No visual loss    Facial palsy: (1)   Minor paralysis (flat nasolabial fold, smile asymmetry)    Motor arm (left): (0)   No drift    Motor arm (right): (1)   Drift    Motor leg (left): (0)   No drift    Motor leg (right): (1)   Drift    Limb ataxia: (0)   Absent    Sensory: (0)   Normal- no sensory loss    Best language: (1)   Mild to moderate aphasia    Dysarthria: (0)   Normal    Extinction and inattention: (0)   No abnormality        Total Score:  4         MEDICATIONS GIVEN IN THE EMERGENCY:  Medications   0.9% sodium chloride BOLUS (500 mLs Intravenous New Bag 9/12/21 1906)   aspirin (ASA) tablet 325 mg (has no administration in time range)   potassium chloride (KAYCIEL) solution 40 mEq (has no administration in time range)   iopamidol (ISOVUE-370) solution 50 mL (50 mLs Intravenous Given 9/12/21 1820)   sodium chloride (PF) 0.9% PF flush 100 mL (100 mLs Intravenous Given 9/12/21 1820)       NEW PRESCRIPTIONS STARTED AT TODAY'S ER VISIT  New Prescriptions    No medications on file          =================================================================    HPI    Patient information was obtained from: Patient      Nidhi Miguel is a 55 year old female with a pertinent history of previous CVA, alcoholic hepatitis, cirrhosis and ascites, and recent drainage of a right-sided pleural effusion.  She presents to the emergency department with right-sided weakness.   she notes that she was feeling fine starting to watch the [employer] game at noon today and then suddenly started to develop right-sided weakness.  She also says that her speech is more slurred and she feels confused.  She called EMS for transport a few  hours after symptoms started.  She also reports some headache which started gradually during this time.  She denies any neck pain.  Denies any chest pain.  Denies any nausea or vomiting.      REVIEW OF SYSTEMS   All systems reviewed and negative except as noted in HPI.    PAST MEDICAL HISTORY:  Past Medical History:   Diagnosis Date     Acquired absence of both cervix and uterus     No Comments Provided     Acute bronchospasm     possible asthma     Allergic rhinitis     2011     Cervicalgia     10/27/2011     COPD (chronic obstructive pulmonary disease) (H)      Hypertension      Low back pain     Chronic low back pain (see narcotic contract).     Mild intermittent asthma, uncomplicated     No Comments Provided     Other psychoactive substance dependence, uncomplicated (H)     No Comments Provided     Personal history of other medical treatment (CODE)      1, para 1     Personal history of other medical treatment (CODE)     L4-5 with degenerative changes     Pneumonia due to other specified bacteria (H)     2012,Respiratory failure, vent support     Sepsis (H)     2012     Uncomplicated alcohol dependence (H)     2011       PAST SURGICAL HISTORY:  Past Surgical History:   Procedure Laterality Date     FUSION CERVICAL ANTERIOR ONE LEVEL      2012,C3-7 posterior instrumented fusion iwth lateral mass screws and cancellous bone and progenix     LUMPECTOMY BREAST      1998,Left, benign     OTHER SURGICAL HISTORY      ,[id number],COLPOSCOPY,Abnormal paps     OTHER SURGICAL HISTORY      ,[id number],OTHER,Right floating kidney     OTHER SURGICAL HISTORY      3/2010,[id number],CERVICAL DISKECTOMY,C5-6, subsequent osteomyelitis and deformity     OTHER SURGICAL HISTORY      2012,[id number],CERVICAL DISKECTOMY,C5-6 redo and C4-7 arthrodesis and graft and anterior placement of plate.     UPPER GI ENDOSCOPY N/A 2020           CURRENT MEDICATIONS:    Current Facility-Administered Medications    Medication     0.9% sodium chloride BOLUS     aspirin (ASA) tablet 325 mg     potassium chloride (KAYCIEL) solution 40 mEq     Current Outpatient Medications   Medication     albuterol (PROAIR HFA/PROVENTIL HFA/VENTOLIN HFA) 108 (90 Base) MCG/ACT inhaler     albuterol (PROVENTIL) (2.5 MG/3ML) 0.083% neb solution     doxycycline hyclate (VIBRA-TABS) 100 MG tablet     furosemide (LASIX) 20 MG tablet     ipratropium - albuterol 0.5 mg/2.5 mg/3 mL (DUONEB) 0.5-2.5 (3) MG/3ML neb solution     omeprazole (PRILOSEC) 20 MG DR capsule     polyethylene glycol-electrolytes (NULYTELY) 420 g solution     predniSONE (DELTASONE) 20 MG tablet     Prenatal Vit-Fe Fumarate-FA (M- PLUS) 27-1 MG TABS     Prenatal Vit-Fe Fumarate-FA (PRENATAL PLUS) 27-1 MG TABS     spironolactone (ALDACTONE) 50 MG tablet     SYMBICORT 160-4.5 MCG/ACT Inhaler     traZODone (DESYREL) 50 MG tablet         ALLERGIES:  Allergies   Allergen Reactions     Codeine Hives     Tylenol 3  Tylenol 3  Tylenol 3     Lorazepam Other (See Comments)     Other reaction(s): Confusion     Acetaminophen-Codeine Hives       FAMILY HISTORY:  Family History   Problem Relation Age of Onset     Coronary Artery Disease Father      Hypertension Father         Hypertension     Prostate Cancer Father      Substance Abuse Father         Alcohol/Drug,alcoholic but quit     Cancer Maternal Grandmother         Cancer,Pancreatic cancer     Substance Abuse Paternal Grandfather         Alcohol/Drug,emphysema chemically induced..     Hypertension Paternal Grandfather         Hypertension     Hyperlipidemia Mother      Diabetes No family hx of      Colon Cancer No family hx of      Breast Cancer No family hx of      Other Cancer No family hx of      Cerebrovascular Disease No family hx of      Thyroid Disease No family hx of      Genetic Disorder No family hx of      Asthma No family hx of      Anesthesia Reaction No family hx of        SOCIAL HISTORY:   Social History      Socioeconomic History     Marital status: Single     Spouse name: None     Number of children: None     Years of education: None     Highest education level: None   Occupational History     None   Tobacco Use     Smoking status: Current Every Day Smoker     Packs/day: 0.30     Years: 30.00     Pack years: 9.00     Types: Cigarettes     Start date: 1/1/1980     Smokeless tobacco: Never Used     Tobacco comment: denies quit plan 3/10/21   Substance and Sexual Activity     Alcohol use: Yes     Alcohol/week: 14.0 standard drinks     Comment: today, vodka straight     Drug use: No     Types: Other     Comment: Drug use: NoHistory of methamphetamine use     Sexual activity: Yes     Partners: Male   Other Topics Concern     Parent/sibling w/ CABG, MI or angioplasty before 65F 55M? Not Asked   Social History Narrative    ** Merged History Encounter **         Mariano-12/2003, back in her custody after rehab in 2012. Now in foster care summer 2013 with her relapse and continues 2015. He goes to [employer].  .    Alcohol and meth use fall 2011, then to [facility] for rehab and now at [facility] after       relapse spring 2013.  Was in [place] 10/2013 for 6 months and work release in  [place] 7 months.   Back in [facility] 1/ 2015.   Only child, no siblings.     Social Determinants of Health     Financial Resource Strain:      Difficulty of Paying Living Expenses:    Food Insecurity:      Worried About Running Out of Food in the Last Year:      Ran Out of Food in the Last Year:    Transportation Needs:      Lack of Transportation (Medical):      Lack of Transportation (Non-Medical):    Physical Activity:      Days of Exercise per Week:      Minutes of Exercise per Session:    Stress:      Feeling of Stress :    Social Connections:      Frequency of Communication with Friends and Family:      Frequency of Social Gatherings with Friends and Family:      Attends [employer] Services:      Active Member of Clubs  or Organizations:      Attends Club or Organization Meetings:      Marital Status:    Intimate Partner Violence:      Fear of Current or Ex-Partner:      Emotionally Abused:      Physically Abused:      Sexually Abused:        VITALS:  /77   Pulse 105   Temp 98.1  F (36.7  C) (Oral)   Resp 17   Wt 52.5 kg (115 lb 11.9 oz)   SpO2 99%   BMI 19.87 kg/m      PHYSICAL EXAM    Constitutional: Thin chronically ill-appearing middle-aged female patient, sitting in bed, no distress  HENT: Normocephalic, Atraumatic. Neck Supple.  Eyes: EOMI, Conjunctiva normal.  Respiratory: Breathing comfortably on room air. Speaks full sentences easily. Lungs clear to ascultation.  Cardiovascular: Normal heart rate, Regular rhythm. No peripheral edema.  Abdomen: Soft, nontender  Musculoskeletal: Good range of motion in all major joints. No major deformities noted.  Integument: Warm, Dry.  Neurologic: Fully awake, alert, oriented.  Some mild word finding difficulty.  Speech is intelligible.  There is mild right-sided facial droop.  Strength in the right upper and lower extremity is diminished.  Patient had right-sided pronator drift but is able to resist gravity and likewise in the right lower extremity can lift against gravity for 5 seconds but slowly returns to the bed.  Left upper and lower extremity strength is normal.  Cranial nerves II through XII otherwise intact.  Sensation light touch intact x4.  No dysmetria.  Psychiatric: Cooperative. Affect appropriate.     LAB:  All pertinent labs reviewed and interpreted.  Labs Ordered and Resulted from Time of ED Arrival Up to the Time of Departure from the ED   CBC WITH PLATELETS - Abnormal; Notable for the following components:       Result Value    RBC Count 2.70 (*)     Hemoglobin 8.5 (*)     Hematocrit 26.9 (*)     RDW 19.5 (*)     All other components within normal limits   INR - Abnormal; Notable for the following components:    INR 1.43 (*)     All other components within  normal limits   BASIC METABOLIC PANEL - Abnormal; Notable for the following components:    Potassium 2.9 (*)     Carbon Dioxide (CO2) 19 (*)     Creatinine 0.15 (*)     Glucose 101 (*)     All other components within normal limits   BLOOD GAS VENOUS WITH OXYHEMOGLOBIN - Abnormal; Notable for the following components:    pCO2 Venous 33 (*)     Oxyhemoglobin Venous 55 (*)     All other components within normal limits   PARTIAL THROMBOPLASTIN TIME - Normal   TROPONIN I   AMMONIA   COVID-19 VIRUS (CORONAVIRUS) BY PCR   NOTIFY   VITAL SIGNS   NEURO CHECKS   MEASURE WEIGHT   ACTIVITY   HEAD OF BED   CARDIAC CONTINUOUS MONITORING   PULSE OXIMETRY NURSING   IP DYSPHAGIA SCREEN   GLUCOSE MONITOR NURSING POCT   IV ACCESS       RADIOLOGY:  Reviewed all pertinent imaging. Please see official radiology report.  XR Chest Port 1 View   Final Result   Impression:    Right upper lobe pneumonia and likely right middle and lower lobe   pneumonia with moderate to large right effusion. Left lung is clear.      SADE COBSO MD            SYSTEM ID:  [id number]      CT Head w/o Contrast   Final Result   IMPRESSION:    No evidence of hemorrhage or mass.  No acute fracture.       Study was discussed with Dr. Starks at 1819 hours 9/12/2021.      SADE COBOS MD            SYSTEM ID:  [id number]      CTA Head Neck with Contrast    (Results Pending)   US Carotid Bilateral    (Results Pending)       EKG:    Performed at: 641 PM    Impression: Sinus tachycardia, otherwise normal ECG    Rate: 103  Rhythm: Sinus tachycardia  Axis: Normal  GA Interval: 122  QRS Interval: 86  QTc Interval: 455  ST Changes: None significant  Comparison: No change when compared to 2 days ago.    I have independently reviewed and interpreted the EKG(s) documented above.      Saroj Starks M.D.  Emergency Medicine  [facility]  [address] [place] [id number] [phone]  Dept: [phone]       Saroj Starks MD  09/12/21  1907

## 2021-09-12 NOTE — ED NOTES
Bed: ED10a  Expected date:   Expected time:   Means of arrival:   Comments:  [place]  Neuro Evaluation

## 2021-09-13 NOTE — ED NOTES
Stroke code arrival note    55 year old female presented to ER via [facility]    At Approx 1200 developed right sided weakness  Tier 2 code stroke called by MD   EMS glucose 163  See Neurological narrator for initial and ongoing 15 minute neurological checks.     Pt taken to CT at 1800

## 2021-09-13 NOTE — ED NOTES
Dysphasia screening done, pt was given spoonful of water x 3 without difficulty. Speech normal, no coughing ,and able to swallow.  Passed screening test.

## 2021-09-13 NOTE — ED NOTES
Spoke with aunmarcella Calloway on the phone  [phone] , pt consented to providing information.    Update will be provided on destination when an accepting MD is found.    Possible [facility] is the destination.

## 2025-07-15 NOTE — PROGRESS NOTES
(R10.13) Abdominal pain, epigastric  (primary encounter diagnosis)  Comment: Refer to surgery for EGD consult. Start Omeprazole 20 mg daily. Note: Abnormal labs.K+ 20 meq started.  Results for orders placed or performed in visit on 02/22/21   CBC with platelets differential     Status: Abnormal   Result Value Ref Range    WBC 12.3 (H) 4.0 - 11.0 10e9/L    RBC Count 1.98 (L) 3.8 - 5.2 10e12/L    Hemoglobin 8.5 (L) 11.7 - 15.7 g/dL    Hematocrit 26.3 (L) 35.0 - 47.0 %     (H) 78 - 100 fl    MCH 42.9 (H) 26.5 - 33.0 pg    MCHC 32.3 31.5 - 36.5 g/dL    RDW 16.9 (H) 10.0 - 15.0 %    Platelet Count 327 150 - 450 10e9/L    % Neutrophils 83.2 %    % Lymphocytes 6.0 %    % Monocytes 9.5 %    % Eosinophils 0.9 %    % Basophils 0.4 %    Absolute Neutrophil 10.2 (H) 1.6 - 8.3 10e9/L    Absolute Lymphocytes 0.7 (L) 0.8 - 5.3 10e9/L    Absolute Monocytes 1.2 0.0 - 1.3 10e9/L    Absolute Eosinophils 0.1 0.0 - 0.7 10e9/L    Absolute Basophils 0.1 0.0 - 0.2 10e9/L    Anisocytosis Slight     Macrocytes Present     Platelet Estimate       Automated count confirmed.  Platelet morphology is normal.    Diff Method Automated Method    Comprehensive metabolic panel     Status: Abnormal   Result Value Ref Range    Sodium 137 133 - 144 mmol/L    Potassium 3.2 (L) 3.4 - 5.3 mmol/L    Chloride 104 94 - 109 mmol/L    Carbon Dioxide 24 20 - 32 mmol/L    Anion Gap 9 3 - 14 mmol/L    Glucose 102 (H) 70 - 99 mg/dL    Urea Nitrogen 5 (L) 7 - 30 mg/dL    Creatinine 0.47 (L) 0.52 - 1.04 mg/dL    GFR Estimate >90 >60 mL/min/[1.73_m2]    GFR Estimate If Black >90 >60 mL/min/[1.73_m2]    Calcium 8.9 8.5 - 10.1 mg/dL    Bilirubin Total 4.1 (H) 0.2 - 1.3 mg/dL    Albumin 2.3 (L) 3.4 - 5.0 g/dL    Protein Total 6.6 (L) 6.8 - 8.8 g/dL    Alkaline Phosphatase 240 (H) 40 - 150 U/L    ALT 30 0 - 50 U/L     (H) 0 - 45 U/L   *UA reflex to Microscopic and Culture - [facility]     Status: Abnormal    Specimen: Midstream Urine   Result Value    Problem: At Risk for Falls  Goal: Patient does not fall  Outcome: Monitoring/Evaluating progress  Goal: Patient takes action to control fall-related risks  Outcome: Monitoring/Evaluating progress     Problem: At Risk for Injury Due to Fall  Goal: Patient does not fall  Outcome: Monitoring/Evaluating progress  Goal: Takes action to control condition specific risks  Outcome: Monitoring/Evaluating progress  Goal: Verbalizes understanding of fall-related injury personal risks  Description: Document education using the patient education activity  Outcome: Monitoring/Evaluating progress     Problem: Impaired Physical Mobility  Goal: Functional status is maintained or returned to baseline during hospitalization  Outcome: Monitoring/Evaluating progress  Goal: Tolerates activity for discharge setting with no abnormal symptoms  Outcome: Monitoring/Evaluating progress      Ref Range    Color Urine Yellow     Appearance Urine Clear     Glucose Urine Negative NEG^Negative mg/dL    Bilirubin Urine Large (A) NEG^Negative    Ketones Urine 15 (A) NEG^Negative mg/dL    Specific Gravity Urine 1.025 1.003 - 1.035    Blood Urine Negative NEG^Negative    pH Urine 5.5 5.0 - 7.0 pH    Protein Albumin Urine 30 (A) NEG^Negative mg/dL    Urobilinogen Urine 4.0 (H) 0.2 - 1.0 EU/dL    Nitrite Urine Positive (A) NEG^Negative    Leukocyte Esterase Urine Trace (A) NEG^Negative    Source Midstream Urine    Lipase     Status: None   Result Value Ref Range    Lipase 221 73 - 393 U/L   Urine Microscopic     Status: Abnormal   Result Value Ref Range    WBC Urine 0 - 5 OTO5^0 - 5 /HPF    RBC Urine O - 2 OTO2^O - 2 /HPF    Squamous Epithelial /LPF Urine Moderate (A) FEW^Few /LPF    Bacteria Urine Many (A) NEG^Negative /HPF   Iron and iron binding capacity     Status: Abnormal   Result Value Ref Range    Iron 58 35 - 180 ug/dL    Iron Binding Cap 183 (L) 240 - 430 ug/dL    Iron Saturation Index 32 15 - 46 %   Ferritin     Status: None   Result Value Ref Range    Ferritin 150 8 - 252 ng/mL   TSH with free T4 reflex     Status: None   Result Value Ref Range    TSH 2.78 0.40 - 4.00 mU/L       Plan: CBC with platelets differential, Comprehensive         metabolic panel, *UA reflex to Microscopic and         Culture - [facility] omeprazole         (PRILOSEC) 20 MG DR capsule, GENERAL SURG ADULT        REFERRAL            (Z87.19) History of GI bleed  Comment: as above  Plan: GENERAL SURG ADULT REFERRAL                Macy Jaimes is a 55 year old who presents for the following health issues. 2-3 month history of worsening epigastric pain and nausea. Vomiting early on but that resolved. No diarrhea, constipation, blood in stool or black stool. History of GI bleed 1 year ago with hospitalization at [facility]. Patient has history of alcoholism but is down to 1 drink 3-4 times weekly.    Patient  Due:  Physical  Shingrix #1 - refused  Mammogram      HPI       Abdominal Pain  Onset/Duration: 2 months  Description:   Character: Sharp, Dull ache, Stabbing, Gnawing, Burning, Fullness and Cramping  Location: epigastric region suprapubic region  Radiation: None   Intensity: severe  Progression of Symptoms:  worsening and constant  Accompanying Signs & Symptoms:  Fever/Chills: YES- chills  Gas/Bloating: YES- lower abdomen  Nausea: YES  Vomitting: no  Diarrhea: YES- once in awhile  Constipation: no  Dysuria or Hematuria: no  History:   Trauma: no  Previous similar pain: YES  Previous tests done: Upper Endoscopy  Precipitating factors:   Does the pain change with:     Food: YES    Bowel Movement: no    Urination: no   Other factors:  no  Therapies tried and outcome: pepto bismol              Review of Systems   Constitutional, HEENT, cardiovascular, pulmonary, gi and gu systems are negative, except as otherwise noted.      Objective    /64   Pulse 116   Temp 98.6  F (37  C) (Tympanic)   Resp 16   Wt 57.6 kg (126 lb 14.4 oz)   SpO2 99%   BMI 21.78 kg/m    Body mass index is 21.78 kg/m .  Physical Exam   Gen:Appears well, in no apparent distress.   Resp: Lungs CTA without wheeze, rales, rhonchi  Card: RRR  Abd:Round, soft. Normal bowel sounds. Mid epigastric and LUQ TTP. No mass or organomegaly.